# Patient Record
Sex: MALE | Race: WHITE | NOT HISPANIC OR LATINO | Employment: OTHER | URBAN - NONMETROPOLITAN AREA
[De-identification: names, ages, dates, MRNs, and addresses within clinical notes are randomized per-mention and may not be internally consistent; named-entity substitution may affect disease eponyms.]

---

## 2024-03-12 ENCOUNTER — HOSPITAL ENCOUNTER (INPATIENT)
Facility: HOSPITAL | Age: 59
LOS: 4 days | Discharge: HOME | DRG: 398 | End: 2024-03-18
Attending: STUDENT IN AN ORGANIZED HEALTH CARE EDUCATION/TRAINING PROGRAM | Admitting: INTERNAL MEDICINE
Payer: MEDICARE

## 2024-03-12 ENCOUNTER — APPOINTMENT (OUTPATIENT)
Dept: CT IMAGING | Facility: HOSPITAL | Age: 59
DRG: 398 | End: 2024-03-12
Payer: MEDICARE

## 2024-03-12 DIAGNOSIS — Z78.9 IMPAIRED MOBILITY AND ACTIVITIES OF DAILY LIVING: ICD-10-CM

## 2024-03-12 DIAGNOSIS — K35.33 ACUTE APPENDICITIS WITH PERFORATION, LOCALIZED PERITONITIS, ABSCESS, AND GANGRENE: ICD-10-CM

## 2024-03-12 DIAGNOSIS — Z74.09 IMPAIRED MOBILITY AND ACTIVITIES OF DAILY LIVING: ICD-10-CM

## 2024-03-12 DIAGNOSIS — K35.32 PERFORATED APPENDICITIS: Primary | ICD-10-CM

## 2024-03-12 DIAGNOSIS — Z90.49 S/P LAPAROSCOPIC APPENDECTOMY: ICD-10-CM

## 2024-03-12 LAB
ALBUMIN SERPL-MCNC: 3.7 G/DL (ref 3.5–5.2)
ALBUMIN/GLOB SERPL: 0.9 G/DL
ALP SERPL-CCNC: 97 U/L (ref 39–117)
ALT SERPL W P-5'-P-CCNC: 17 U/L (ref 1–41)
ANION GAP SERPL CALCULATED.3IONS-SCNC: 12.2 MMOL/L (ref 5–15)
AST SERPL-CCNC: 15 U/L (ref 1–40)
BASOPHILS # BLD AUTO: 0.04 10*3/MM3 (ref 0–0.2)
BASOPHILS NFR BLD AUTO: 0.3 % (ref 0–1.5)
BILIRUB SERPL-MCNC: 0.6 MG/DL (ref 0–1.2)
BUN SERPL-MCNC: 13 MG/DL (ref 6–20)
BUN/CREAT SERPL: 10.2 (ref 7–25)
CALCIUM SPEC-SCNC: 9.4 MG/DL (ref 8.6–10.5)
CHLORIDE SERPL-SCNC: 95 MMOL/L (ref 98–107)
CO2 SERPL-SCNC: 24.8 MMOL/L (ref 22–29)
CREAT SERPL-MCNC: 1.27 MG/DL (ref 0.76–1.27)
DEPRECATED RDW RBC AUTO: 41.6 FL (ref 37–54)
EGFRCR SERPLBLD CKD-EPI 2021: 65.9 ML/MIN/1.73
EOSINOPHIL # BLD AUTO: 0.21 10*3/MM3 (ref 0–0.4)
EOSINOPHIL NFR BLD AUTO: 1.5 % (ref 0.3–6.2)
ERYTHROCYTE [DISTWIDTH] IN BLOOD BY AUTOMATED COUNT: 13.2 % (ref 12.3–15.4)
GLOBULIN UR ELPH-MCNC: 3.9 GM/DL
GLUCOSE SERPL-MCNC: 286 MG/DL (ref 65–99)
HCT VFR BLD AUTO: 40.2 % (ref 37.5–51)
HGB BLD-MCNC: 13.6 G/DL (ref 13–17.7)
HOLD SPECIMEN: NORMAL
HOLD SPECIMEN: NORMAL
IMM GRANULOCYTES # BLD AUTO: 0.25 10*3/MM3 (ref 0–0.05)
IMM GRANULOCYTES NFR BLD AUTO: 1.8 % (ref 0–0.5)
LIPASE SERPL-CCNC: 16 U/L (ref 13–60)
LYMPHOCYTES # BLD AUTO: 2.21 10*3/MM3 (ref 0.7–3.1)
LYMPHOCYTES NFR BLD AUTO: 16.2 % (ref 19.6–45.3)
MCH RBC QN AUTO: 28.9 PG (ref 26.6–33)
MCHC RBC AUTO-ENTMCNC: 33.8 G/DL (ref 31.5–35.7)
MCV RBC AUTO: 85.5 FL (ref 79–97)
MONOCYTES # BLD AUTO: 1.25 10*3/MM3 (ref 0.1–0.9)
MONOCYTES NFR BLD AUTO: 9.2 % (ref 5–12)
NEUTROPHILS NFR BLD AUTO: 71 % (ref 42.7–76)
NEUTROPHILS NFR BLD AUTO: 9.67 10*3/MM3 (ref 1.7–7)
NRBC BLD AUTO-RTO: 0 /100 WBC (ref 0–0.2)
PLATELET # BLD AUTO: 243 10*3/MM3 (ref 140–450)
PMV BLD AUTO: 9.5 FL (ref 6–12)
POTASSIUM SERPL-SCNC: 4.2 MMOL/L (ref 3.5–5.2)
PROT SERPL-MCNC: 7.6 G/DL (ref 6–8.5)
RBC # BLD AUTO: 4.7 10*6/MM3 (ref 4.14–5.8)
SODIUM SERPL-SCNC: 132 MMOL/L (ref 136–145)
TROPONIN T SERPL HS-MCNC: 45 NG/L
TROPONIN T SERPL HS-MCNC: 48 NG/L
WBC NRBC COR # BLD AUTO: 13.63 10*3/MM3 (ref 3.4–10.8)
WHOLE BLOOD HOLD COAG: NORMAL
WHOLE BLOOD HOLD SPECIMEN: NORMAL

## 2024-03-12 PROCEDURE — 93005 ELECTROCARDIOGRAM TRACING: CPT | Performed by: STUDENT IN AN ORGANIZED HEALTH CARE EDUCATION/TRAINING PROGRAM

## 2024-03-12 PROCEDURE — 83690 ASSAY OF LIPASE: CPT

## 2024-03-12 PROCEDURE — 74177 CT ABD & PELVIS W/CONTRAST: CPT

## 2024-03-12 PROCEDURE — 25510000001 IOPAMIDOL 61 % SOLUTION: Performed by: STUDENT IN AN ORGANIZED HEALTH CARE EDUCATION/TRAINING PROGRAM

## 2024-03-12 PROCEDURE — 99285 EMERGENCY DEPT VISIT HI MDM: CPT

## 2024-03-12 PROCEDURE — 36415 COLL VENOUS BLD VENIPUNCTURE: CPT

## 2024-03-12 PROCEDURE — 84484 ASSAY OF TROPONIN QUANT: CPT

## 2024-03-12 PROCEDURE — 84484 ASSAY OF TROPONIN QUANT: CPT | Performed by: PHYSICIAN ASSISTANT

## 2024-03-12 PROCEDURE — 80053 COMPREHEN METABOLIC PANEL: CPT

## 2024-03-12 PROCEDURE — 25010000002 MORPHINE PER 10 MG: Performed by: STUDENT IN AN ORGANIZED HEALTH CARE EDUCATION/TRAINING PROGRAM

## 2024-03-12 PROCEDURE — 25010000002 ONDANSETRON PER 1 MG: Performed by: STUDENT IN AN ORGANIZED HEALTH CARE EDUCATION/TRAINING PROGRAM

## 2024-03-12 PROCEDURE — 83036 HEMOGLOBIN GLYCOSYLATED A1C: CPT | Performed by: INTERNAL MEDICINE

## 2024-03-12 PROCEDURE — 85025 COMPLETE CBC W/AUTO DIFF WBC: CPT

## 2024-03-12 RX ORDER — ONDANSETRON 2 MG/ML
4 INJECTION INTRAMUSCULAR; INTRAVENOUS ONCE
Status: COMPLETED | OUTPATIENT
Start: 2024-03-12 | End: 2024-03-12

## 2024-03-12 RX ORDER — SODIUM CHLORIDE 0.9 % (FLUSH) 0.9 %
10 SYRINGE (ML) INJECTION AS NEEDED
Status: DISCONTINUED | OUTPATIENT
Start: 2024-03-12 | End: 2024-03-18 | Stop reason: HOSPADM

## 2024-03-12 RX ADMIN — MORPHINE SULFATE 4 MG: 4 INJECTION, SOLUTION INTRAMUSCULAR; INTRAVENOUS at 22:40

## 2024-03-12 RX ADMIN — IOPAMIDOL 100 ML: 612 INJECTION, SOLUTION INTRAVENOUS at 23:30

## 2024-03-12 RX ADMIN — ONDANSETRON 4 MG: 2 INJECTION INTRAMUSCULAR; INTRAVENOUS at 22:40

## 2024-03-13 ENCOUNTER — ANESTHESIA EVENT (OUTPATIENT)
Dept: PERIOP | Facility: HOSPITAL | Age: 59
End: 2024-03-13
Payer: MEDICARE

## 2024-03-13 ENCOUNTER — ANESTHESIA (OUTPATIENT)
Dept: PERIOP | Facility: HOSPITAL | Age: 59
End: 2024-03-13
Payer: MEDICARE

## 2024-03-13 PROBLEM — Z79.4 TYPE 2 DIABETES MELLITUS, WITH LONG-TERM CURRENT USE OF INSULIN: Status: ACTIVE | Noted: 2024-03-13

## 2024-03-13 PROBLEM — K35.33 ACUTE APPENDICITIS WITH PERFORATION, LOCALIZED PERITONITIS, ABSCESS, AND GANGRENE: Status: ACTIVE | Noted: 2024-03-13

## 2024-03-13 PROBLEM — K35.32 PERFORATED APPENDICITIS: Status: ACTIVE | Noted: 2024-03-13

## 2024-03-13 PROBLEM — E11.9 TYPE 2 DIABETES MELLITUS, WITH LONG-TERM CURRENT USE OF INSULIN: Status: ACTIVE | Noted: 2024-03-13

## 2024-03-13 PROBLEM — I10 ESSENTIAL HYPERTENSION: Status: ACTIVE | Noted: 2024-03-13

## 2024-03-13 LAB
ANION GAP SERPL CALCULATED.3IONS-SCNC: 11.2 MMOL/L (ref 5–15)
BACTERIA UR QL AUTO: ABNORMAL /HPF
BILIRUB UR QL STRIP: NEGATIVE
BUN SERPL-MCNC: 13 MG/DL (ref 6–20)
BUN/CREAT SERPL: 10 (ref 7–25)
CALCIUM SPEC-SCNC: 8.4 MG/DL (ref 8.6–10.5)
CHLORIDE SERPL-SCNC: 100 MMOL/L (ref 98–107)
CLARITY UR: CLEAR
CO2 SERPL-SCNC: 22.8 MMOL/L (ref 22–29)
COLOR UR: YELLOW
CREAT SERPL-MCNC: 1.3 MG/DL (ref 0.76–1.27)
DEPRECATED RDW RBC AUTO: 41.8 FL (ref 37–54)
EGFRCR SERPLBLD CKD-EPI 2021: 64.1 ML/MIN/1.73
ERYTHROCYTE [DISTWIDTH] IN BLOOD BY AUTOMATED COUNT: 13.3 % (ref 12.3–15.4)
GLUCOSE BLDC GLUCOMTR-MCNC: 129 MG/DL (ref 70–130)
GLUCOSE BLDC GLUCOMTR-MCNC: 136 MG/DL (ref 70–130)
GLUCOSE BLDC GLUCOMTR-MCNC: 136 MG/DL (ref 70–130)
GLUCOSE BLDC GLUCOMTR-MCNC: 178 MG/DL (ref 70–130)
GLUCOSE BLDC GLUCOMTR-MCNC: 211 MG/DL (ref 70–130)
GLUCOSE BLDC GLUCOMTR-MCNC: 273 MG/DL (ref 70–130)
GLUCOSE SERPL-MCNC: 143 MG/DL (ref 65–99)
GLUCOSE UR STRIP-MCNC: ABNORMAL MG/DL
HBA1C MFR BLD: 9.3 % (ref 4.8–5.6)
HCT VFR BLD AUTO: 36.8 % (ref 37.5–51)
HGB BLD-MCNC: 12.4 G/DL (ref 13–17.7)
HGB UR QL STRIP.AUTO: NEGATIVE
HOLD SPECIMEN: NORMAL
HYALINE CASTS UR QL AUTO: ABNORMAL /LPF
KETONES UR QL STRIP: NEGATIVE
LEUKOCYTE ESTERASE UR QL STRIP.AUTO: NEGATIVE
MCH RBC QN AUTO: 28.9 PG (ref 26.6–33)
MCHC RBC AUTO-ENTMCNC: 33.7 G/DL (ref 31.5–35.7)
MCV RBC AUTO: 85.8 FL (ref 79–97)
NITRITE UR QL STRIP: NEGATIVE
PH UR STRIP.AUTO: 5.5 [PH] (ref 5–8)
PLATELET # BLD AUTO: 225 10*3/MM3 (ref 140–450)
PMV BLD AUTO: 9.4 FL (ref 6–12)
POTASSIUM SERPL-SCNC: 4.1 MMOL/L (ref 3.5–5.2)
PROT UR QL STRIP: ABNORMAL
RBC # BLD AUTO: 4.29 10*6/MM3 (ref 4.14–5.8)
RBC # UR STRIP: ABNORMAL /HPF
REF LAB TEST METHOD: ABNORMAL
SODIUM SERPL-SCNC: 134 MMOL/L (ref 136–145)
SP GR UR STRIP: >1.03 (ref 1–1.03)
SQUAMOUS #/AREA URNS HPF: ABNORMAL /HPF
UROBILINOGEN UR QL STRIP: ABNORMAL
WBC # UR STRIP: ABNORMAL /HPF
WBC NRBC COR # BLD AUTO: 12.39 10*3/MM3 (ref 3.4–10.8)

## 2024-03-13 PROCEDURE — 25810000003 LACTATED RINGERS PER 1000 ML: Performed by: SURGERY

## 2024-03-13 PROCEDURE — 25010000002 SUGAMMADEX 200 MG/2ML SOLUTION: Performed by: NURSE ANESTHETIST, CERTIFIED REGISTERED

## 2024-03-13 PROCEDURE — 99222 1ST HOSP IP/OBS MODERATE 55: CPT | Performed by: SURGERY

## 2024-03-13 PROCEDURE — 25010000002 PIPERACILLIN SOD-TAZOBACTAM PER 1 G: Performed by: INTERNAL MEDICINE

## 2024-03-13 PROCEDURE — 82948 REAGENT STRIP/BLOOD GLUCOSE: CPT | Performed by: INTERNAL MEDICINE

## 2024-03-13 PROCEDURE — G0378 HOSPITAL OBSERVATION PER HR: HCPCS

## 2024-03-13 PROCEDURE — 25010000002 ONDANSETRON PER 1 MG: Performed by: NURSE ANESTHETIST, CERTIFIED REGISTERED

## 2024-03-13 PROCEDURE — 25010000002 ENOXAPARIN PER 10 MG: Performed by: INTERNAL MEDICINE

## 2024-03-13 PROCEDURE — 82948 REAGENT STRIP/BLOOD GLUCOSE: CPT

## 2024-03-13 PROCEDURE — 25010000002 LIDOCAINE 1 % SOLUTION: Performed by: SURGERY

## 2024-03-13 PROCEDURE — 25810000003 SODIUM CHLORIDE 0.9 % SOLUTION: Performed by: STUDENT IN AN ORGANIZED HEALTH CARE EDUCATION/TRAINING PROGRAM

## 2024-03-13 PROCEDURE — 63710000001 INSULIN DETEMIR PER 5 UNITS: Performed by: INTERNAL MEDICINE

## 2024-03-13 PROCEDURE — 63710000001 INSULIN REGULAR HUMAN PER 5 UNITS: Performed by: SURGERY

## 2024-03-13 PROCEDURE — 87040 BLOOD CULTURE FOR BACTERIA: CPT | Performed by: PHYSICIAN ASSISTANT

## 2024-03-13 PROCEDURE — 25010000002 PIPERACILLIN SOD-TAZOBACTAM PER 1 G: Performed by: PHYSICIAN ASSISTANT

## 2024-03-13 PROCEDURE — 25010000002 CEFAZOLIN PER 500 MG: Performed by: SURGERY

## 2024-03-13 PROCEDURE — 85027 COMPLETE CBC AUTOMATED: CPT | Performed by: INTERNAL MEDICINE

## 2024-03-13 PROCEDURE — 80048 BASIC METABOLIC PNL TOTAL CA: CPT | Performed by: INTERNAL MEDICINE

## 2024-03-13 PROCEDURE — 63710000001 INSULIN REGULAR HUMAN PER 5 UNITS: Performed by: INTERNAL MEDICINE

## 2024-03-13 PROCEDURE — 25010000002 FENTANYL CITRATE (PF) 50 MCG/ML SOLUTION PREFILLED SYRINGE: Performed by: NURSE ANESTHETIST, CERTIFIED REGISTERED

## 2024-03-13 PROCEDURE — 25010000002 HYDROMORPHONE 1 MG/ML SOLUTION: Performed by: NURSE ANESTHETIST, CERTIFIED REGISTERED

## 2024-03-13 PROCEDURE — 25010000002 DEXAMETHASONE PER 1 MG: Performed by: NURSE ANESTHETIST, CERTIFIED REGISTERED

## 2024-03-13 PROCEDURE — 81001 URINALYSIS AUTO W/SCOPE: CPT | Performed by: STUDENT IN AN ORGANIZED HEALTH CARE EDUCATION/TRAINING PROGRAM

## 2024-03-13 PROCEDURE — 25810000003 SODIUM CHLORIDE 0.9 % SOLUTION: Performed by: INTERNAL MEDICINE

## 2024-03-13 PROCEDURE — 99232 SBSQ HOSP IP/OBS MODERATE 35: CPT | Performed by: NURSE PRACTITIONER

## 2024-03-13 PROCEDURE — 25010000002 HEPARIN (PORCINE) PER 1000 UNITS: Performed by: SURGERY

## 2024-03-13 PROCEDURE — 25010000002 BUPIVACAINE (PF) 0.25 % SOLUTION: Performed by: SURGERY

## 2024-03-13 PROCEDURE — 44970 LAPAROSCOPY APPENDECTOMY: CPT | Performed by: SURGERY

## 2024-03-13 PROCEDURE — 25010000002 PROPOFOL 200 MG/20ML EMULSION: Performed by: NURSE ANESTHETIST, CERTIFIED REGISTERED

## 2024-03-13 PROCEDURE — 0DTJ4ZZ RESECTION OF APPENDIX, PERCUTANEOUS ENDOSCOPIC APPROACH: ICD-10-PCS | Performed by: SURGERY

## 2024-03-13 PROCEDURE — 25010000002 PIPERACILLIN SOD-TAZOBACTAM PER 1 G: Performed by: SURGERY

## 2024-03-13 PROCEDURE — 99223 1ST HOSP IP/OBS HIGH 75: CPT | Performed by: INTERNAL MEDICINE

## 2024-03-13 DEVICE — LIGAMAX 5 MM ENDOSCOPIC MULTIPLE CLIP APPLIER
Type: IMPLANTABLE DEVICE | Site: ABDOMEN | Status: FUNCTIONAL
Brand: LIGAMAX

## 2024-03-13 DEVICE — THE ECHELON, ECHELON ENDOPATH™ AND ECHELON FLEX™ FAMILIES OF ENDOSCOPIC LINEAR CUTTERS AND RELOADS ARE STERILE, SINGLE PATIENT USE INSTRUMENTS THAT SIMULTANEOUSLY CUT AND STAPLE TISSUE. THERE ARE SIX STAGGERED ROWS OF STAPLES, THREE ON EITHER SIDE OF THE CUT LINE. THE 45 MM INSTRUMENTS HAVE A STAPLE LINE THATIS APPROXIMATELY 45 MM LONG AND A CUT LINE THAT IS APPROXIMATELY 42 MM LONG. THE SHAFT CAN ROTATE FREELY IN BOTH DIRECTIONS AND AN ARTICULATION MECHANISM ON ARTICULATING INSTRUMENTS ENABLES BENDING THE DISTAL PORTIONOF THE SHAFT TO FACILITATE LATERAL ACCESS OF THE OPERATIVE SITE.THE INSTRUMENTS ARE SHIPPED WITHOUT A RELOAD AND MUST BE LOADED PRIOR TO USE. A STAPLE RETAINING CAP ON THE RELOAD PROTECTS THE STAPLE LEG POINTS DURING SHIPPING AND TRANSPORTATION. THE INSTRUMENTS’ LOCK-OUT FEATURE IS DESIGNED TO PREVENT A USED RELOAD FROM BEING REFIRED.
Type: IMPLANTABLE DEVICE | Site: ABDOMEN | Status: FUNCTIONAL
Brand: ECHELON ENDOPATH

## 2024-03-13 RX ORDER — ACETAMINOPHEN 325 MG/1
650 TABLET ORAL EVERY 4 HOURS PRN
Status: DISCONTINUED | OUTPATIENT
Start: 2024-03-13 | End: 2024-03-18 | Stop reason: HOSPADM

## 2024-03-13 RX ORDER — PROPOFOL 10 MG/ML
INJECTION, EMULSION INTRAVENOUS AS NEEDED
Status: DISCONTINUED | OUTPATIENT
Start: 2024-03-13 | End: 2024-03-13 | Stop reason: SURG

## 2024-03-13 RX ORDER — ENOXAPARIN SODIUM 100 MG/ML
40 INJECTION SUBCUTANEOUS EVERY 24 HOURS
Status: DISCONTINUED | OUTPATIENT
Start: 2024-03-13 | End: 2024-03-18 | Stop reason: HOSPADM

## 2024-03-13 RX ORDER — IPRATROPIUM BROMIDE AND ALBUTEROL SULFATE 2.5; .5 MG/3ML; MG/3ML
3 SOLUTION RESPIRATORY (INHALATION) ONCE AS NEEDED
Status: DISCONTINUED | OUTPATIENT
Start: 2024-03-13 | End: 2024-03-13 | Stop reason: HOSPADM

## 2024-03-13 RX ORDER — DEXTROSE MONOHYDRATE 25 G/50ML
25 INJECTION, SOLUTION INTRAVENOUS
Status: DISCONTINUED | OUTPATIENT
Start: 2024-03-13 | End: 2024-03-18 | Stop reason: HOSPADM

## 2024-03-13 RX ORDER — CEFAZOLIN SODIUM IN 0.9 % NACL 3 G/100 ML
3000 INTRAVENOUS SOLUTION, PIGGYBACK (ML) INTRAVENOUS ONCE
Status: COMPLETED | OUTPATIENT
Start: 2024-03-13 | End: 2024-03-13

## 2024-03-13 RX ORDER — LIDOCAINE HCL/PF 100 MG/5ML
SYRINGE (ML) INJECTION AS NEEDED
Status: DISCONTINUED | OUTPATIENT
Start: 2024-03-13 | End: 2024-03-13 | Stop reason: SURG

## 2024-03-13 RX ORDER — SODIUM CHLORIDE 9 MG/ML
40 INJECTION, SOLUTION INTRAVENOUS AS NEEDED
Status: DISCONTINUED | OUTPATIENT
Start: 2024-03-13 | End: 2024-03-13 | Stop reason: HOSPADM

## 2024-03-13 RX ORDER — ACETAMINOPHEN 160 MG/5ML
650 SOLUTION ORAL EVERY 4 HOURS PRN
Status: DISCONTINUED | OUTPATIENT
Start: 2024-03-13 | End: 2024-03-18 | Stop reason: HOSPADM

## 2024-03-13 RX ORDER — SODIUM CHLORIDE, SODIUM LACTATE, POTASSIUM CHLORIDE, CALCIUM CHLORIDE 600; 310; 30; 20 MG/100ML; MG/100ML; MG/100ML; MG/100ML
50 INJECTION, SOLUTION INTRAVENOUS CONTINUOUS
Status: DISCONTINUED | OUTPATIENT
Start: 2024-03-13 | End: 2024-03-13

## 2024-03-13 RX ORDER — SODIUM CHLORIDE 0.9 % (FLUSH) 0.9 %
10 SYRINGE (ML) INJECTION EVERY 12 HOURS SCHEDULED
Status: DISCONTINUED | OUTPATIENT
Start: 2024-03-13 | End: 2024-03-13 | Stop reason: HOSPADM

## 2024-03-13 RX ORDER — LIDOCAINE HYDROCHLORIDE 10 MG/ML
INJECTION, SOLUTION INFILTRATION; PERINEURAL AS NEEDED
Status: DISCONTINUED | OUTPATIENT
Start: 2024-03-13 | End: 2024-03-13 | Stop reason: HOSPADM

## 2024-03-13 RX ORDER — MORPHINE SULFATE 2 MG/ML
2 INJECTION, SOLUTION INTRAMUSCULAR; INTRAVENOUS EVERY 4 HOURS PRN
Status: DISPENSED | OUTPATIENT
Start: 2024-03-13 | End: 2024-03-18

## 2024-03-13 RX ORDER — HEPARIN SODIUM 5000 [USP'U]/ML
5000 INJECTION, SOLUTION INTRAVENOUS; SUBCUTANEOUS ONCE
Status: COMPLETED | OUTPATIENT
Start: 2024-03-13 | End: 2024-03-13

## 2024-03-13 RX ORDER — INSULIN LISPRO 100 [IU]/ML
45 INJECTION, SOLUTION INTRAVENOUS; SUBCUTANEOUS
COMMUNITY

## 2024-03-13 RX ORDER — BISACODYL 10 MG
10 SUPPOSITORY, RECTAL RECTAL DAILY PRN
Status: DISCONTINUED | OUTPATIENT
Start: 2024-03-13 | End: 2024-03-15

## 2024-03-13 RX ORDER — PROCHLORPERAZINE EDISYLATE 5 MG/ML
10 INJECTION INTRAMUSCULAR; INTRAVENOUS ONCE AS NEEDED
Status: DISCONTINUED | OUTPATIENT
Start: 2024-03-13 | End: 2024-03-13 | Stop reason: HOSPADM

## 2024-03-13 RX ORDER — BUPIVACAINE HYDROCHLORIDE 2.5 MG/ML
INJECTION, SOLUTION EPIDURAL; INFILTRATION; INTRACAUDAL AS NEEDED
Status: DISCONTINUED | OUTPATIENT
Start: 2024-03-13 | End: 2024-03-13 | Stop reason: HOSPADM

## 2024-03-13 RX ORDER — DEXAMETHASONE SODIUM PHOSPHATE 4 MG/ML
INJECTION, SOLUTION INTRA-ARTICULAR; INTRALESIONAL; INTRAMUSCULAR; INTRAVENOUS; SOFT TISSUE AS NEEDED
Status: DISCONTINUED | OUTPATIENT
Start: 2024-03-13 | End: 2024-03-13 | Stop reason: SURG

## 2024-03-13 RX ORDER — INSULIN GLARGINE 300 U/ML
120 INJECTION, SOLUTION SUBCUTANEOUS DAILY
COMMUNITY
Start: 2023-10-24 | End: 2024-04-21

## 2024-03-13 RX ORDER — ROCURONIUM BROMIDE 50 MG/5 ML
SYRINGE (ML) INTRAVENOUS AS NEEDED
Status: DISCONTINUED | OUTPATIENT
Start: 2024-03-13 | End: 2024-03-13 | Stop reason: SURG

## 2024-03-13 RX ORDER — SUCCINYLCHOLINE/SOD CL,ISO/PF 200MG/10ML
SYRINGE (ML) INTRAVENOUS AS NEEDED
Status: DISCONTINUED | OUTPATIENT
Start: 2024-03-13 | End: 2024-03-13 | Stop reason: SURG

## 2024-03-13 RX ORDER — ONDANSETRON 2 MG/ML
INJECTION INTRAMUSCULAR; INTRAVENOUS AS NEEDED
Status: DISCONTINUED | OUTPATIENT
Start: 2024-03-13 | End: 2024-03-13 | Stop reason: SURG

## 2024-03-13 RX ORDER — ACETAMINOPHEN 650 MG/1
650 SUPPOSITORY RECTAL EVERY 4 HOURS PRN
Status: DISCONTINUED | OUTPATIENT
Start: 2024-03-13 | End: 2024-03-18 | Stop reason: HOSPADM

## 2024-03-13 RX ORDER — BISACODYL 5 MG/1
5 TABLET, DELAYED RELEASE ORAL DAILY PRN
Status: DISCONTINUED | OUTPATIENT
Start: 2024-03-13 | End: 2024-03-15

## 2024-03-13 RX ORDER — MAGNESIUM HYDROXIDE 1200 MG/15ML
LIQUID ORAL AS NEEDED
Status: DISCONTINUED | OUTPATIENT
Start: 2024-03-13 | End: 2024-03-13 | Stop reason: HOSPADM

## 2024-03-13 RX ORDER — AMOXICILLIN 250 MG
2 CAPSULE ORAL 2 TIMES DAILY PRN
Status: DISCONTINUED | OUTPATIENT
Start: 2024-03-13 | End: 2024-03-15

## 2024-03-13 RX ORDER — ONDANSETRON 2 MG/ML
4 INJECTION INTRAMUSCULAR; INTRAVENOUS ONCE AS NEEDED
Status: DISCONTINUED | OUTPATIENT
Start: 2024-03-13 | End: 2024-03-13 | Stop reason: HOSPADM

## 2024-03-13 RX ORDER — SODIUM CHLORIDE 9 MG/ML
100 INJECTION, SOLUTION INTRAVENOUS CONTINUOUS
Status: DISCONTINUED | OUTPATIENT
Start: 2024-03-13 | End: 2024-03-16

## 2024-03-13 RX ORDER — POLYETHYLENE GLYCOL 3350 17 G/17G
17 POWDER, FOR SOLUTION ORAL DAILY PRN
Status: DISCONTINUED | OUTPATIENT
Start: 2024-03-13 | End: 2024-03-15

## 2024-03-13 RX ORDER — SODIUM CHLORIDE 0.9 % (FLUSH) 0.9 %
10 SYRINGE (ML) INJECTION EVERY 12 HOURS SCHEDULED
Status: DISCONTINUED | OUTPATIENT
Start: 2024-03-13 | End: 2024-03-18 | Stop reason: HOSPADM

## 2024-03-13 RX ORDER — DULAGLUTIDE 4.5 MG/.5ML
4.5 INJECTION, SOLUTION SUBCUTANEOUS WEEKLY
COMMUNITY

## 2024-03-13 RX ORDER — NICOTINE POLACRILEX 4 MG
15 LOZENGE BUCCAL
Status: DISCONTINUED | OUTPATIENT
Start: 2024-03-13 | End: 2024-03-18 | Stop reason: HOSPADM

## 2024-03-13 RX ORDER — SODIUM CHLORIDE 0.9 % (FLUSH) 0.9 %
10 SYRINGE (ML) INJECTION AS NEEDED
Status: DISCONTINUED | OUTPATIENT
Start: 2024-03-13 | End: 2024-03-18 | Stop reason: HOSPADM

## 2024-03-13 RX ORDER — SODIUM CHLORIDE 0.9 % (FLUSH) 0.9 %
10 SYRINGE (ML) INJECTION AS NEEDED
Status: DISCONTINUED | OUTPATIENT
Start: 2024-03-13 | End: 2024-03-13 | Stop reason: HOSPADM

## 2024-03-13 RX ORDER — NALOXONE HCL 0.4 MG/ML
0.4 VIAL (ML) INJECTION
Status: DISCONTINUED | OUTPATIENT
Start: 2024-03-13 | End: 2024-03-18 | Stop reason: HOSPADM

## 2024-03-13 RX ORDER — SODIUM CHLORIDE 9 MG/ML
40 INJECTION, SOLUTION INTRAVENOUS AS NEEDED
Status: DISCONTINUED | OUTPATIENT
Start: 2024-03-13 | End: 2024-03-18 | Stop reason: HOSPADM

## 2024-03-13 RX ORDER — HYDROCODONE BITARTRATE AND ACETAMINOPHEN 7.5; 325 MG/1; MG/1
1 TABLET ORAL EVERY 4 HOURS PRN
Status: DISPENSED | OUTPATIENT
Start: 2024-03-13 | End: 2024-03-18

## 2024-03-13 RX ORDER — FENTANYL CITRATE 50 UG/ML
INJECTION, SOLUTION INTRAMUSCULAR; INTRAVENOUS AS NEEDED
Status: DISCONTINUED | OUTPATIENT
Start: 2024-03-13 | End: 2024-03-13 | Stop reason: SURG

## 2024-03-13 RX ORDER — ONDANSETRON 2 MG/ML
4 INJECTION INTRAMUSCULAR; INTRAVENOUS EVERY 6 HOURS PRN
Status: DISCONTINUED | OUTPATIENT
Start: 2024-03-13 | End: 2024-03-18 | Stop reason: HOSPADM

## 2024-03-13 RX ADMIN — Medication 100 MG: at 13:43

## 2024-03-13 RX ADMIN — PIPERACILLIN SODIUM AND TAZOBACTAM SODIUM 4.5 G: 4; .5 INJECTION, SOLUTION INTRAVENOUS at 10:59

## 2024-03-13 RX ADMIN — DEXAMETHASONE SODIUM PHOSPHATE 4 MG: 4 INJECTION, SOLUTION INTRA-ARTICULAR; INTRALESIONAL; INTRAMUSCULAR; INTRAVENOUS; SOFT TISSUE at 14:13

## 2024-03-13 RX ADMIN — SUGAMMADEX 200 MG: 100 INJECTION, SOLUTION INTRAVENOUS at 15:28

## 2024-03-13 RX ADMIN — ONDANSETRON 4 MG: 2 INJECTION INTRAMUSCULAR; INTRAVENOUS at 14:13

## 2024-03-13 RX ADMIN — Medication 30 MG: at 13:48

## 2024-03-13 RX ADMIN — SODIUM CHLORIDE 1000 ML: 9 INJECTION, SOLUTION INTRAVENOUS at 01:37

## 2024-03-13 RX ADMIN — Medication 180 MG: at 13:43

## 2024-03-13 RX ADMIN — Medication 10 ML: at 08:38

## 2024-03-13 RX ADMIN — ENOXAPARIN SODIUM 40 MG: 40 INJECTION SUBCUTANEOUS at 06:14

## 2024-03-13 RX ADMIN — PIPERACILLIN SODIUM AND TAZOBACTAM SODIUM 4.5 G: 4; .5 INJECTION, SOLUTION INTRAVENOUS at 18:23

## 2024-03-13 RX ADMIN — Medication 10 MG: at 13:43

## 2024-03-13 RX ADMIN — PROPOFOL 200 MG: 10 INJECTION, EMULSION INTRAVENOUS at 13:43

## 2024-03-13 RX ADMIN — HUMAN INSULIN 4 UNITS: 100 INJECTION, SOLUTION SUBCUTANEOUS at 18:00

## 2024-03-13 RX ADMIN — Medication 10 ML: at 02:44

## 2024-03-13 RX ADMIN — HYDROMORPHONE HYDROCHLORIDE 0.5 MG: 1 INJECTION, SOLUTION INTRAMUSCULAR; INTRAVENOUS; SUBCUTANEOUS at 16:37

## 2024-03-13 RX ADMIN — Medication 10 ML: at 20:25

## 2024-03-13 RX ADMIN — FENTANYL CITRATE 50 MCG: 50 INJECTION, SOLUTION INTRAMUSCULAR; INTRAVENOUS at 13:39

## 2024-03-13 RX ADMIN — HYDROMORPHONE HYDROCHLORIDE 0.5 MG: 1 INJECTION, SOLUTION INTRAMUSCULAR; INTRAVENOUS; SUBCUTANEOUS at 15:53

## 2024-03-13 RX ADMIN — Medication 10 MG: at 14:34

## 2024-03-13 RX ADMIN — HEPARIN SODIUM 5000 UNITS: 5000 INJECTION, SOLUTION INTRAVENOUS; SUBCUTANEOUS at 13:22

## 2024-03-13 RX ADMIN — FENTANYL CITRATE 50 MCG: 50 INJECTION, SOLUTION INTRAMUSCULAR; INTRAVENOUS at 14:24

## 2024-03-13 RX ADMIN — Medication 10 ML: at 17:28

## 2024-03-13 RX ADMIN — PIPERACILLIN SODIUM AND TAZOBACTAM SODIUM 4.5 G: 4; .5 INJECTION, SOLUTION INTRAVENOUS at 01:37

## 2024-03-13 RX ADMIN — INSULIN DETEMIR 8 UNITS: 100 INJECTION, SOLUTION SUBCUTANEOUS at 08:38

## 2024-03-13 RX ADMIN — SODIUM CHLORIDE, POTASSIUM CHLORIDE, SODIUM LACTATE AND CALCIUM CHLORIDE: 600; 310; 30; 20 INJECTION, SOLUTION INTRAVENOUS at 15:00

## 2024-03-13 RX ADMIN — SODIUM CHLORIDE 100 ML/HR: 9 INJECTION, SOLUTION INTRAVENOUS at 06:37

## 2024-03-13 RX ADMIN — SODIUM CHLORIDE, POTASSIUM CHLORIDE, SODIUM LACTATE AND CALCIUM CHLORIDE: 600; 310; 30; 20 INJECTION, SOLUTION INTRAVENOUS at 13:34

## 2024-03-13 RX ADMIN — Medication 3 G: at 13:48

## 2024-03-13 RX ADMIN — HUMAN INSULIN 2 UNITS: 100 INJECTION, SOLUTION SUBCUTANEOUS at 02:17

## 2024-03-13 NOTE — H&P
[facility]   HISTORY AND PHYSICAL      Name:  Deep Mendez   Age:  58 y.o.  Sex:  male  :  1965  MRN:  [id number]   Visit Number:  [id number]  Admission Date:  3/12/2024  Date Of Service:  24  Primary Care Physician:  Sami Fisher MD    Chief Complaint:     Abdominal pain.    History Of Presenting Illness:      Deep Mendez is a 58-year-old male with history of hypertension and diabetes mellitus type 2 on insulin and Trulicity was brought to the emergency room by his friend with right lower quadrant abdominal pain that started earlier in the day.  Patient states that he was in his usual state of health until today when he was in the bathroom had a bowel movement, subsequently developed right upper and lower quadrant pain that was sharp and radiating up into the chest.  He had some nausea but no vomiting.  No prior history of abdominal surgeries.  He is diabetic and is status post low left below knee amputation.  No fevers.    In the emergency room, he was afebrile and hemodynamically stable saturating at 97% on room air but his blood pressure was elevated at 164/88.  Initial troponin 45 with a repeat at 48.  CMP was unremarkable except for a sodium of 132 and a glucose of 286.  Lipase normal at 16.  CBC unremarkable except for a white count of 13.6.  CT of the abdomen and pelvis done in the emergency room showed findings consistent with perforated appendicitis with ill-defined developing periappendiceal abscess.  Nonobstructing punctate left renal stones were noted.  Patient's condition was discussed with Dr. Zayas from surgical services who recommended antibiotics therapy and admission to the hospital.  Patient was given morphine, Zofran and IV Zosyn in the emergency room.  He also received a liter of normal saline bolus and was subsequently admitted to the medical floor with telemetry for perforated appendicitis.    Review Of Systems:    All systems were reviewed  "and negative except as mentioned in history of presenting illness, assessment and plan.    Past Medical History: Patient  has a past medical history of Diabetes mellitus and Hypertension.    Past Surgical History: Patient  has a past surgical history that includes Below knee leg amputation and Eye surgery.    Social History: Patient  reports that he has quit smoking. His smoking use included cigarettes. He does not have any smokeless tobacco history on file. He reports that he does not drink alcohol and does not use drugs.    Family History:  Patient denies any significant past medical history.    Allergies:      Patient has no known allergies.    Home Medications:    Prior to Admission Medications       None     ED Medications:    Medications   sodium chloride 0.9 % flush 10 mL (has no administration in time range)   sodium chloride 0.9 % bolus 1,000 mL (has no administration in time range)   piperacillin-tazobactam (ZOSYN) 4.5 g in iso-osmotic dextrose 100 mL IVPB (premix) (has no administration in time range)   morphine injection 4 mg (4 mg Intravenous Given 3/12/24 2240)   ondansetron (ZOFRAN) injection 4 mg (4 mg Intravenous Given 3/12/24 2240)   iopamidol (ISOVUE-300) 61 % injection 100 mL (100 mL Intravenous Given 3/12/24 2330)     Vital Signs:  Temp:  [97.7 °F (36.5 °C)] 97.7 °F (36.5 °C)  Heart Rate:  [92-99] 92  Resp:  [18] 18  BP: (133-164)/(77-88) 133/82        03/12/24 2030   Weight: 124 kg (273 lb)     Body mass index is 37.03 kg/m².    Physical Exam:     Most recent vital Signs: /82   Pulse 92   Temp 97.7 °F (36.5 °C) (Oral)   Resp 18   Ht 182.9 cm (72\")   Wt 124 kg (273 lb)   SpO2 95%   BMI 37.03 kg/m²     Physical Exam  Constitutional:       General: He is not in acute distress.     Appearance: Normal appearance. He is obese. He is not ill-appearing.   HENT:      Head: Normocephalic and atraumatic.      Right Ear: External ear normal.      Left Ear: External ear normal.      Nose: Nose " normal.      Mouth/Throat:      Mouth: Mucous membranes are moist.   Eyes:      Extraocular Movements: Extraocular movements intact.      Conjunctiva/sclera: Conjunctivae normal.   Cardiovascular:      Rate and Rhythm: Normal rate and regular rhythm.      Pulses: Normal pulses.      Heart sounds: Normal heart sounds. No murmur heard.  Pulmonary:      Effort: Pulmonary effort is normal.      Breath sounds: Normal breath sounds. No wheezing or rales.   Abdominal:      General: Bowel sounds are normal.      Palpations: Abdomen is soft.      Tenderness: There is abdominal tenderness. There is no guarding or rebound.      Comments: Obese abdomen with right lower quadrant tenderness.   Musculoskeletal:         General: Normal range of motion.      Cervical back: Neck supple.      Right lower leg: Edema present.      Comments: Left below-knee amputation with prosthetic leg noted.  2+ pitting edema noted on the right leg.   Skin:     General: Skin is warm.      Findings: No erythema or rash.   Neurological:      General: No focal deficit present.      Mental Status: He is oriented to person, place, and time. Mental status is at baseline.   Psychiatric:         Mood and Affect: Mood normal.         Behavior: Behavior normal.       Laboratory data:    I have reviewed the labs done in the emergency room.    Results from last 7 days   Lab Units 03/12/24 2040   SODIUM mmol/L 132*   POTASSIUM mmol/L 4.2   CHLORIDE mmol/L 95*   CO2 mmol/L 24.8   BUN mg/dL 13   CREATININE mg/dL 1.27   CALCIUM mg/dL 9.4   BILIRUBIN mg/dL 0.6   ALK PHOS U/L 97   ALT (SGPT) U/L 17   AST (SGOT) U/L 15   GLUCOSE mg/dL 286*     Results from last 7 days   Lab Units 03/12/24 2040   WBC 10*3/mm3 13.63*   HEMOGLOBIN g/dL 13.6   HEMATOCRIT % 40.2   PLATELETS 10*3/mm3 243         Results from last 7 days   Lab Units 03/12/24  2242 03/12/24 2040   HSTROP T ng/L 48* 45*             Results from last 7 days   Lab Units 03/12/24 2040   LIPASE U/L 16     EKG:       EKG done in the emergency room was reviewed by me.  It shows sinus rhythm at 95 bpm.  Normal axis.  No significant ST-T changes were noted.    Radiology:    CT Abdomen Pelvis With Contrast    Result Date: 3/13/2024  FINAL REPORT TECHNIQUE: null CLINICAL HISTORY: RUQ abd pain COMPARISON: null FINDINGS: CT abdomen and pelvis with contrast Comparison: None Findings: There is mild bibasilar atelectasis. The liver, pancreas, spleen, and adrenal glands are unremarkable. There are few tiny stones in the gallbladder. Gallbladder appears otherwise normal. There is a punctate nonobstructing left renal stone. Right kidney is unremarkable. The proximal portion of the appendix is dilated. The distal portion of the appendix is ruptured with an ill-defined developing abscess containing foci of gas measuring about 4.5 x 3.7 cm. There is reactive wall thickening of the distal sigmoid colon which passes immediately adjacent to the collection. There is no bowel obstruction. Aorta is normal diameter. There are no enlarged lymph nodes. There is no acute fracture or suspicious lytic or sclerotic lesion. There is a chronic appearing mild superior endplate compression fracture of L5.     Impression: 1. Findings consistent with perforated appendicitis with ill-defined developing periappendiceal abscess. 2. Chronic findings as above. Authenticated and Electronically Signed by Musa Esquivel MD on 03/13/2024 12:20:57 AM     Assessment:    Acute perforated appendicitis with abscess, POA.  Diabetes mellitus type 2 with hyperglycemia, POA.  Essential hypertension.  Obesity with a BMI of 37.  Status post left BKA.    Plan:    Acute perforated appendicitis.  - Patient will be continued on IV antibiotics therapy with Zosyn.  - Morphine and Zofran for pain control and nausea.  - We will consult Dr. Zayas from surgical services.  - We will keep him n.p.o. with ice chips until evaluated by surgery.  - Continue IV fluids with normal saline at 100  mL/h.    Diabetes mellitus type 2.  - We will place him on low-dose Levemir 8 units daily.  - Subcutaneous insulin protocol for coverage.  - Will obtain hemoglobin A1c levels.      Risk Assessment: Moderate  DVT Prophylaxis: Enoxaparin  Code Status: Full  Diet: N.p.o.      Ryan Ozuna MD  03/13/24  01:16 EDT    Dictated utilizing Dragon dictation.

## 2024-03-13 NOTE — CONSULTS
General Surgery Consult     Name:Deep Mendez  Age: 58 y.o.  Gender: male  : 1965  MRN: [id number]  Visit Number: [id number]  Admit Date: 3/12/2024  Date of Service: 24    Patient Care Team:  Sami Fisher MD as PCP - General (Internal Medicine)  Provider, No Known as PCP - Family Medicine    Reason for Consultation: appendicitis    Chief complaint : abdominal pain      History of Present Illness:     Deep Mendez is a 58 y.o. male patient with a past medical history significant for hypertension, diabetes mellitus type 2, on insulin therapy, status post left BKA, who presented to the emergency department overnight with the onset of severe abdominal pain late yesterday evening while having a bowel movement.  He describes the pain as being in the right lower abdomen and described as being sharp and radiating up through the abdomen into the chest.  He reported associated nausea without vomiting.  He denied fevers or chills.  He reports that he had been in his usual state of health earlier in the day.  However, upon further questioning, he endorses a several day history of alteration in his appetite, with decreased p.o. intake.  He denies any urinary symptoms.  He also endorses having small, hard stools for the several days preceding the onset of pain.    In the emergency department he was noted to be afebrile with an elevated blood pressure of 164/88.  He was nontachycardic.       Patient Active Problem List   Diagnosis    Perforated appendicitis    Type 2 diabetes mellitus, with long-term current use of insulin    Essential hypertension         Past Medical History:   Diagnosis Date    Diabetes mellitus     Hypertension        Past Surgical History:   Procedure Laterality Date    BELOW KNEE LEG AMPUTATION      EYE SURGERY         History reviewed. No pertinent family history.    Social History     Socioeconomic History    Marital status: Single   Tobacco Use    Smoking status: Former     Types:  Cigarettes   Substance and Sexual Activity    Alcohol use: Never    Drug use: Never    Sexual activity: Defer         Current Facility-Administered Medications:     acetaminophen (TYLENOL) tablet 650 mg, 650 mg, Oral, Q4H PRN **OR** acetaminophen (TYLENOL) 160 MG/5ML oral solution 650 mg, 650 mg, Oral, Q4H PRN **OR** acetaminophen (TYLENOL) suppository 650 mg, 650 mg, Rectal, Q4H PRN, Ryan Ozuna MD    sennosides-docusate (PERICOLACE) 8.6-50 MG per tablet 2 tablet, 2 tablet, Oral, BID PRN **AND** polyethylene glycol (MIRALAX) packet 17 g, 17 g, Oral, Daily PRN **AND** bisacodyl (DULCOLAX) EC tablet 5 mg, 5 mg, Oral, Daily PRN **AND** bisacodyl (DULCOLAX) suppository 10 mg, 10 mg, Rectal, Daily PRN, Ryan Ozuna MD    dextrose (D50W) (25 g/50 mL) IV injection 25 g, 25 g, Intravenous, Q15 Min PRN, Ryan Ozuna MD    dextrose (GLUTOSE) oral gel 15 g, 15 g, Oral, Q15 Min PRN, Ryan Ozuna MD    Enoxaparin Sodium (LOVENOX) syringe 40 mg, 40 mg, Subcutaneous, Q24H, Ryan Ozuna MD, 40 mg at 03/13/24 0614    glucagon (GLUCAGEN) injection 1 mg, 1 mg, Intramuscular, Q15 Min PRN, Ryan Ozuna MD    HYDROcodone-acetaminophen (NORCO) 7.5-325 MG per tablet 1 tablet, 1 tablet, Oral, Q4H PRN, Ryan Ozuna MD    insulin detemir (LEVEMIR) injection 8 Units, 8 Units, Subcutaneous, Daily, Ryan Ozuna MD, 8 Units at 03/13/24 0838    insulin regular (humuLIN R,novoLIN R) injection 2-9 Units, 2-9 Units, Subcutaneous, Q6H, Ryan Ozuna MD, 2 Units at 03/13/24 0217    Morphine sulfate (PF) injection 2 mg, 2 mg, Intravenous, Q4H PRN **AND** naloxone (NARCAN) injection 0.4 mg, 0.4 mg, Intravenous, Q5 Min PRN, Ryan Ozuna MD    ondansetron (ZOFRAN) injection 4 mg, 4 mg, Intravenous, Q6H PRN, Ryan Ozuna MD    Pharmacy to Dose enoxaparin (LOVENOX), , Does not apply, Continuous PRN, Ryan Ozuna MD    Pharmacy to Dose Zosyn, , Does not apply, Continuous PRN, Ryan Ozuna MD    piperacillin-tazobactam (ZOSYN) 4.5 g in  iso-osmotic dextrose 100 mL IVPB (premix), 4.5 g, Intravenous, Q8H, Ryan Ozuna MD    sodium chloride 0.9 % flush 10 mL, 10 mL, Intravenous, PRN, Calvin Alexis DO    sodium chloride 0.9 % flush 10 mL, 10 mL, Intravenous, Q12H, Ryan Ozuna MD, 10 mL at 03/13/24 0838    sodium chloride 0.9 % flush 10 mL, 10 mL, Intravenous, PRN, Ryan Ozuna MD    sodium chloride 0.9 % infusion 40 mL, 40 mL, Intravenous, PRN, Ryan Ozuna MD    sodium chloride 0.9 % infusion, 100 mL/hr, Intravenous, Continuous, Ryan Ozuna MD, Last Rate: 100 mL/hr at 03/13/24 0637, 100 mL/hr at 03/13/24 0637  No current outpatient medications on file.    (Not in a hospital admission)      No Known Allergies    Review of Systems   Constitutional:  Positive for appetite change. Negative for chills, fever and unexpected weight change.   HENT:  Negative for trouble swallowing and voice change.    Eyes:  Negative for visual disturbance.   Respiratory:  Negative for apnea, cough, chest tightness, shortness of breath and wheezing.    Cardiovascular:  Negative for chest pain, palpitations and leg swelling.   Gastrointestinal:  Positive for abdominal pain and constipation. Negative for abdominal distention, anal bleeding, blood in stool, diarrhea, nausea, rectal pain and vomiting.   Endocrine: Negative for cold intolerance and heat intolerance.   Genitourinary:  Negative for difficulty urinating, dysuria, flank pain, scrotal swelling and testicular pain.   Musculoskeletal:  Negative for back pain, gait problem and joint swelling.   Skin:  Negative for color change, rash and wound.   Neurological:  Negative for dizziness, syncope, speech difficulty, weakness, numbness and headaches.   Hematological:  Negative for adenopathy. Does not bruise/bleed easily.   Psychiatric/Behavioral:  Negative for confusion. The patient is not nervous/anxious.        OBJECTIVE:     Vital Signs  Temp:  [97.7 °F (36.5 °C)] 97.7 °F (36.5 °C)  Heart Rate:  [88-99] 91  Resp:   [18] 18  BP: (114-164)/(65-88) 118/73    No intake/output data recorded.  I/O last 3 completed shifts:  In: 1100 [IV Piggyback:1100]  Out: 800 [Urine:800]      Physical Exam:      General Appearance:    Alert, cooperative, in no acute distress   Head:    Normocephalic, without obvious abnormality, atraumatic   Eyes:            Lids and lashes normal, conjunctivae and sclerae normal, no icterus   Ears:    Ears appear intact with no abnormalities noted   Lungs:     Respirations regular, even and unlabored    Heart:    Regular rhythm and normal rate   Abdomen:     Obese, soft ttp in the RLQ, no scars   Extremities:   Left BKA   Pulses:   Pulses palpable and equal bilaterally   Skin:   No bleeding, bruising or rash   Neurologic:   AAOx3, no gross deficits         Results Review:  I have reviewed the entirety of the patient's clinical lab results.  I have also personally reviewed the patient's imaging    Narrative & Impression   FINAL REPORT     TECHNIQUE:  null     CLINICAL HISTORY:  RUQ abd pain     COMPARISON:  null     FINDINGS:  CT abdomen and pelvis with contrast     Comparison: None     Findings:     There is mild bibasilar atelectasis.     The liver, pancreas, spleen, and adrenal glands are unremarkable. There are few tiny stones in the gallbladder. Gallbladder appears otherwise normal. There is a punctate nonobstructing left renal stone. Right kidney is unremarkable. The proximal portion   of the appendix is dilated. The distal portion of the appendix is ruptured with an ill-defined developing abscess containing foci of gas measuring about 4.5 x 3.7 cm. There is reactive wall thickening of the distal sigmoid colon which passes immediately   adjacent to the collection. There is no bowel obstruction. Aorta is normal diameter. There are no enlarged lymph nodes. There is no acute fracture or suspicious lytic or sclerotic lesion. There is a chronic appearing mild superior endplate compression   fracture of L5.      IMPRESSION:  Impression:     1. Findings consistent with perforated appendicitis with ill-defined developing periappendiceal abscess.     2. Chronic findings as above.     Authenticated and Electronically Signed by Musa Esquivel MD on  03/13/2024 12:20:57 AM         Lab Results (last 72 hours)       Procedure Component Value Units Date/Time    POC Glucose Once [[id number]]  (Abnormal) Collected: 03/13/24 0833    Specimen: Blood Updated: 03/13/24 0835     Glucose 136 mg/dL      Comment: Serial Number: [id number]:  [id number]       Urinalysis, Microscopic Only - Urine, Clean Catch [[id number]]  (Abnormal) Collected: 03/13/24 0622    Specimen: Urine, Clean Catch Updated: 03/13/24 0700     RBC, UA 0-2 /HPF      WBC, UA 3-5 /HPF      Bacteria, UA Trace /HPF      Squamous Epithelial Cells, UA 0-2 /HPF      Hyaline Casts, UA None Seen /LPF      Methodology Manual Light Microscopy    Basic Metabolic Panel [[id number]]  (Abnormal) Collected: 03/13/24 0553    Specimen: Blood Updated: 03/13/24 0639     Glucose 143 mg/dL      BUN 13 mg/dL      Creatinine 1.30 mg/dL      Sodium 134 mmol/L      Potassium 4.1 mmol/L      Chloride 100 mmol/L      CO2 22.8 mmol/L      Calcium 8.4 mg/dL      BUN/Creatinine Ratio 10.0     Anion Gap 11.2 mmol/L      eGFR 64.1 mL/min/1.73     Narrative:      GFR Normal >60  Chronic Kidney Disease <60  Kidney Failure <15      Urinalysis With Microscopic If Indicated (No Culture) - Urine, Clean Catch [[id number]]  (Abnormal) Collected: 03/13/24 0622    Specimen: Urine, Clean Catch Updated: 03/13/24 0629     Color, UA Yellow     Appearance, UA Clear     pH, UA 5.5     Specific Gravity, UA >1.030     Glucose,  mg/dL (Trace)     Ketones, UA Negative     Bilirubin, UA Negative     Blood, UA Negative     Protein, UA 30 mg/dL (1+)     Leuk Esterase, UA Negative     Nitrite, UA Negative     Urobilinogen, UA 1.0 E.U./dL    CBC (No Diff) [[id number]]  (Abnormal) Collected: 03/13/24 0553    Specimen: Blood  Updated: 03/13/24 0609     WBC 12.39 10*3/mm3      RBC 4.29 10*6/mm3      Hemoglobin 12.4 g/dL      Hematocrit 36.8 %      MCV 85.8 fL      MCH 28.9 pg      MCHC 33.7 g/dL      RDW 13.3 %      RDW-SD 41.8 fl      MPV 9.4 fL      Platelets 225 10*3/mm3     POC Glucose Once [[id number]]  (Abnormal) Collected: 03/13/24 0521    Specimen: Blood Updated: 03/13/24 0524     Glucose 136 mg/dL      Comment: Serial Number: [id number]:  [id number]       Hemoglobin A1c [[id number]]  (Abnormal) Collected: 03/12/24 2040    Specimen: Blood Updated: 03/13/24 0227     Hemoglobin A1C 9.30 %     Narrative:      Hemoglobin A1C Ranges:    Increased Risk for Diabetes  5.7% to 6.4%  Diabetes                     >= 6.5%  Diabetic Goal                < 7.0%    POC Glucose Once [[id number]]  (Abnormal) Collected: 03/13/24 0205    Specimen: Blood Updated: 03/13/24 0208     Glucose 178 mg/dL      Comment: Serial Number: [id number]:  [id number]       Blood Culture - Blood, Arm, Left [[id number]] Collected: 03/13/24 0112    Specimen: Blood from Arm, Left Updated: 03/13/24 0123    Blood Culture - Blood, Hand, Right [[id number]] Collected: 03/13/24 0109    Specimen: Blood from Hand, Right Updated: 03/13/24 0123    [place] Draw [[id number]] Collected: 03/12/24 2040    Specimen: Blood Updated: 03/13/24 0045    Narrative:      The following orders were created for panel order [place] Draw.  Procedure                               Abnormality         Status                     ---------                               -----------         ------                     Green Top (Gel)[[id number]]                                  Final result               Lavender Top[[id number]]                                     Final result               Gold Top - SST[[id number]]                                   Final result               Gray Top[[id number]]                                         Final result               Light Blue Top[[id number]]                                    Final result                 Please view results for these tests on the individual orders.    Gray Top [[id number]] Collected: 03/12/24 2040    Specimen: Blood Updated: 03/13/24 0045     Extra Tube Hold for add-ons.     Comment: Auto resulted.       Single High Sensitivity Troponin T [[id number]]  (Abnormal) Collected: 03/12/24 2242    Specimen: Blood Updated: 03/12/24 2309     HS Troponin T 48 ng/L     Narrative:      High Sensitive Troponin T Reference Range:  <14.0 ng/L- Negative Female for AMI  <22.0 ng/L- Negative Male for AMI  >=14 - Abnormal Female indicating possible myocardial injury.  >=22 - Abnormal Male indicating possible myocardial injury.   Clinicians would have to utilize clinical acumen, EKG, Troponin, and serial changes to determine if it is an Acute Myocardial Infarction or myocardial injury due to an underlying chronic condition.         Green Top (Gel) [[id number]] Collected: 03/12/24 2040    Specimen: Blood Updated: 03/12/24 2146     Extra Tube Hold for add-ons.     Comment: Auto resulted.       Lavender Top [[id number]] Collected: 03/12/24 2040    Specimen: Blood Updated: 03/12/24 2146     Extra Tube hold for add-on     Comment: Auto resulted       Gold Top - SST [[id number]] Collected: 03/12/24 2040    Specimen: Blood Updated: 03/12/24 2146     Extra Tube Hold for add-ons.     Comment: Auto resulted.       Light Blue Top [[id number]] Collected: 03/12/24 2040    Specimen: Blood Updated: 03/12/24 2146     Extra Tube Hold for add-ons.     Comment: Auto resulted       Single High Sensitivity Troponin T [[id number]]  (Abnormal) Collected: 03/12/24 2040    Specimen: Blood Updated: 03/12/24 2115     HS Troponin T 45 ng/L     Narrative:      High Sensitive Troponin T Reference Range:  <14.0 ng/L- Negative Female for AMI  <22.0 ng/L- Negative Male for AMI  >=14 - Abnormal Female indicating possible myocardial injury.  >=22 - Abnormal Male indicating possible myocardial injury.   Clinicians would  have to utilize clinical acumen, EKG, Troponin, and serial changes to determine if it is an Acute Myocardial Infarction or myocardial injury due to an underlying chronic condition.         Lipase [[id number]]  (Normal) Collected: 03/12/24 2040    Specimen: Blood Updated: 03/12/24 2110     Lipase 16 U/L     Comprehensive Metabolic Panel [[id number]]  (Abnormal) Collected: 03/12/24 2040    Specimen: Blood Updated: 03/12/24 2110     Glucose 286 mg/dL      Comment: Glucose >180, Hemoglobin A1C recommended.        BUN 13 mg/dL      Creatinine 1.27 mg/dL      Sodium 132 mmol/L      Potassium 4.2 mmol/L      Chloride 95 mmol/L      CO2 24.8 mmol/L      Calcium 9.4 mg/dL      Total Protein 7.6 g/dL      Albumin 3.7 g/dL      ALT (SGPT) 17 U/L      AST (SGOT) 15 U/L      Alkaline Phosphatase 97 U/L      Total Bilirubin 0.6 mg/dL      Globulin 3.9 gm/dL      A/G Ratio 0.9 g/dL      BUN/Creatinine Ratio 10.2     Anion Gap 12.2 mmol/L      eGFR 65.9 mL/min/1.73     Narrative:      GFR Normal >60  Chronic Kidney Disease <60  Kidney Failure <15      CBC & Differential [[id number]]  (Abnormal) Collected: 03/12/24 2040    Specimen: Blood Updated: 03/12/24 2049    Narrative:      The following orders were created for panel order CBC & Differential.  Procedure                               Abnormality         Status                     ---------                               -----------         ------                     CBC Auto Differential[[id number]]        Abnormal            Final result                 Please view results for these tests on the individual orders.    CBC Auto Differential [[id number]]  (Abnormal) Collected: 03/12/24 2040    Specimen: Blood Updated: 03/12/24 2049     WBC 13.63 10*3/mm3      RBC 4.70 10*6/mm3      Hemoglobin 13.6 g/dL      Hematocrit 40.2 %      MCV 85.5 fL      MCH 28.9 pg      MCHC 33.8 g/dL      RDW 13.2 %      RDW-SD 41.6 fl      MPV 9.5 fL      Platelets 243 10*3/mm3      Neutrophil % 71.0 %       Lymphocyte % 16.2 %      Monocyte % 9.2 %      Eosinophil % 1.5 %      Basophil % 0.3 %      Immature Grans % 1.8 %      Neutrophils, Absolute 9.67 10*3/mm3      Lymphocytes, Absolute 2.21 10*3/mm3      Monocytes, Absolute 1.25 10*3/mm3      Eosinophils, Absolute 0.21 10*3/mm3      Basophils, Absolute 0.04 10*3/mm3      Immature Grans, Absolute 0.25 10*3/mm3      nRBC 0.0 /100 WBC                             ASSESSMENT/PLAN:      Perforated appendicitis    Type 2 diabetes mellitus, with long-term current use of insulin    Essential hypertension      Mr. Mendez is a 59 yo male patient who presented overnight with the acute onset of severe pain during a bowel movement in the right lower quadrant.  His workup ultimately demonstrated evidence of perforated appendicitis with early abscess formation.  I suspect that his appendicitis is actually been going on for a number of days, and that his visceral pain response is blunted secondary to his underlying diabetes mellitus.  I had a long discussion with the patient at the bedside regarding the options for management including continued nonoperative management with IV antibiotics, and bowel rest with reimaging in 24 to 48 hours to see if a drainable abscess has formed.  Alternatively, we discussed proceeding with operative intervention with hopes of achieving definitive management.  I explained to him that he is at high risk for conversion to an open procedure, or requiring a more extensive procedure than a simple laparoscopic appendectomy given the level of inflammation seen on his CT scan.  He also understands the likelihood of requiring drain placement intraoperatively.  Lastly, he understands that he will likely remain admitted to the facility for several days postoperatively for ongoing IV antibiotics and standard postoperative management depending on his intraoperative course.  At the conclusion of our discussion, he wished to move forward with operative intervention,  and understood the risks, benefits, and alternatives as discussed.  He had no additional questions, and provided his informed consent to proceed.      Yazmin Zayas MD  03/13/24  09:46 EDT

## 2024-03-13 NOTE — CASE MANAGEMENT/SOCIAL WORK
1553 Cm attempted multiple times to talk to pt to complete DCP, SDOH. Unable to complete due to pt being in OR most of the afternoon

## 2024-03-13 NOTE — ED PROVIDER NOTES
"Subjective  History of Present Illness:    Chief Complaint:   Chief Complaint   Patient presents with    Abdominal Pain      History of Present Illness: Deep Mendez is a 58 y.o. male who presents to the emergency department complaining of right upper quadrant abdominal pain.  Patient states he had a normal bowel movement this evening and then right afterwards had acute onset of right upper abdominal pain that radiated up into his chest.  Nausea but no vomiting.  History of diabetes with a left BKA hypertension.  No cardiac history.  No past abdominal surgeries.  Onset: This evening  Duration: Ongoing  Exacerbating / Alleviating factors: Pain improved by holding pressure to the right upper quadrant per patient  Associated symptoms: Nausea      Nurses Notes reviewed and agree, including vitals, allergies, social history and prior medical history.     Review of Systems   Constitutional: Negative.    HENT: Negative.     Eyes: Negative.    Respiratory: Negative.     Cardiovascular: Negative.    Gastrointestinal:  Positive for abdominal pain and nausea.   Genitourinary: Negative.    Musculoskeletal: Negative.    Skin: Negative.    Allergic/Immunologic: Negative.    Neurological: Negative.    Psychiatric/Behavioral: Negative.     All other systems reviewed and are negative.      Past Medical History:   Diagnosis Date    Diabetes mellitus     Hypertension        Allergies:    Patient has no known allergies.      Past Surgical History:   Procedure Laterality Date    BELOW KNEE LEG AMPUTATION      EYE SURGERY           Social History     Socioeconomic History    Marital status: Single   Tobacco Use    Smoking status: Former     Types: Cigarettes   Substance and Sexual Activity    Alcohol use: Never    Drug use: Never    Sexual activity: Defer         History reviewed. No pertinent family history.    Objective  Physical Exam:  /82   Pulse 92   Temp 97.7 °F (36.5 °C) (Oral)   Resp 18   Ht 182.9 cm (72\")   Wt 124 " kg (273 lb)   SpO2 95%   BMI 37.03 kg/m²      Physical Exam  Vitals and nursing note reviewed.   Constitutional:       General: He is not in acute distress.     Appearance: He is well-developed and normal weight. He is not ill-appearing, toxic-appearing or diaphoretic.   HENT:      Head: Normocephalic and atraumatic.   Eyes:      Extraocular Movements: Extraocular movements intact.   Cardiovascular:      Rate and Rhythm: Normal rate and regular rhythm.   Pulmonary:      Effort: Pulmonary effort is normal.   Abdominal:      General: Abdomen is flat. Bowel sounds are normal.      Palpations: Abdomen is soft.      Tenderness:  in the right upper quadrant There is no guarding or rebound.   Skin:     General: Skin is warm and dry.   Neurological:      General: No focal deficit present.      Mental Status: He is alert.   Psychiatric:         Mood and Affect: Mood normal.         Behavior: Behavior normal.           Procedures    ED Course:    ED Course as of 03/13/24 0055   Tue Mar 12, 2024   2137 WBC(!): 13.63 [TM]   2137 Lipase: 16 [TM]   2137 HS Troponin T(!): 45 [TM]      ED Course User Index  [TM] Ronaldo Gates PA-C       Lab Results (last 24 hours)       Procedure Component Value Units Date/Time    CBC & Differential [[id number]]  (Abnormal) Collected: 03/12/24 2040    Specimen: Blood Updated: 03/12/24 2049    Narrative:      The following orders were created for panel order CBC & Differential.  Procedure                               Abnormality         Status                     ---------                               -----------         ------                     CBC Auto Differential[[id number]]        Abnormal            Final result                 Please view results for these tests on the individual orders.    Comprehensive Metabolic Panel [[id number]]  (Abnormal) Collected: 03/12/24 2040    Specimen: Blood Updated: 03/12/24 2110     Glucose 286 mg/dL      Comment: Glucose >180, Hemoglobin A1C  recommended.        BUN 13 mg/dL      Creatinine 1.27 mg/dL      Sodium 132 mmol/L      Potassium 4.2 mmol/L      Chloride 95 mmol/L      CO2 24.8 mmol/L      Calcium 9.4 mg/dL      Total Protein 7.6 g/dL      Albumin 3.7 g/dL      ALT (SGPT) 17 U/L      AST (SGOT) 15 U/L      Alkaline Phosphatase 97 U/L      Total Bilirubin 0.6 mg/dL      Globulin 3.9 gm/dL      A/G Ratio 0.9 g/dL      BUN/Creatinine Ratio 10.2     Anion Gap 12.2 mmol/L      eGFR 65.9 mL/min/1.73     Narrative:      GFR Normal >60  Chronic Kidney Disease <60  Kidney Failure <15      Lipase [[id number]]  (Normal) Collected: 03/12/24 2040    Specimen: Blood Updated: 03/12/24 2110     Lipase 16 U/L     Single High Sensitivity Troponin T [[id number]]  (Abnormal) Collected: 03/12/24 2040    Specimen: Blood Updated: 03/12/24 2115     HS Troponin T 45 ng/L     Narrative:      High Sensitive Troponin T Reference Range:  <14.0 ng/L- Negative Female for AMI  <22.0 ng/L- Negative Male for AMI  >=14 - Abnormal Female indicating possible myocardial injury.  >=22 - Abnormal Male indicating possible myocardial injury.   Clinicians would have to utilize clinical acumen, EKG, Troponin, and serial changes to determine if it is an Acute Myocardial Infarction or myocardial injury due to an underlying chronic condition.         CBC Auto Differential [[id number]]  (Abnormal) Collected: 03/12/24 2040    Specimen: Blood Updated: 03/12/24 2049     WBC 13.63 10*3/mm3      RBC 4.70 10*6/mm3      Hemoglobin 13.6 g/dL      Hematocrit 40.2 %      MCV 85.5 fL      MCH 28.9 pg      MCHC 33.8 g/dL      RDW 13.2 %      RDW-SD 41.6 fl      MPV 9.5 fL      Platelets 243 10*3/mm3      Neutrophil % 71.0 %      Lymphocyte % 16.2 %      Monocyte % 9.2 %      Eosinophil % 1.5 %      Basophil % 0.3 %      Immature Grans % 1.8 %      Neutrophils, Absolute 9.67 10*3/mm3      Lymphocytes, Absolute 2.21 10*3/mm3      Monocytes, Absolute 1.25 10*3/mm3      Eosinophils, Absolute 0.21 10*3/mm3       Basophils, Absolute 0.04 10*3/mm3      Immature Grans, Absolute 0.25 10*3/mm3      nRBC 0.0 /100 WBC     Single High Sensitivity Troponin T [[id number]]  (Abnormal) Collected: 03/12/24 2242    Specimen: Blood Updated: 03/12/24 2309     HS Troponin T 48 ng/L     Narrative:      High Sensitive Troponin T Reference Range:  <14.0 ng/L- Negative Female for AMI  <22.0 ng/L- Negative Male for AMI  >=14 - Abnormal Female indicating possible myocardial injury.  >=22 - Abnormal Male indicating possible myocardial injury.   Clinicians would have to utilize clinical acumen, EKG, Troponin, and serial changes to determine if it is an Acute Myocardial Infarction or myocardial injury due to an underlying chronic condition.                  CT Abdomen Pelvis With Contrast    Result Date: 3/13/2024  FINAL REPORT TECHNIQUE: null CLINICAL HISTORY: RUQ abd pain COMPARISON: null FINDINGS: CT abdomen and pelvis with contrast Comparison: None Findings: There is mild bibasilar atelectasis. The liver, pancreas, spleen, and adrenal glands are unremarkable. There are few tiny stones in the gallbladder. Gallbladder appears otherwise normal. There is a punctate nonobstructing left renal stone. Right kidney is unremarkable. The proximal portion of the appendix is dilated. The distal portion of the appendix is ruptured with an ill-defined developing abscess containing foci of gas measuring about 4.5 x 3.7 cm. There is reactive wall thickening of the distal sigmoid colon which passes immediately adjacent to the collection. There is no bowel obstruction. Aorta is normal diameter. There are no enlarged lymph nodes. There is no acute fracture or suspicious lytic or sclerotic lesion. There is a chronic appearing mild superior endplate compression fracture of L5.     Impression: Impression: 1. Findings consistent with perforated appendicitis with ill-defined developing periappendiceal abscess. 2. Chronic findings as above. Authenticated and  Electronically Signed by Musa Esquivel MD on 03/13/2024 12:20:57 AM                           Medical Decision Making  Amount and/or Complexity of Data Reviewed  Labs: ordered. Decision-making details documented in ED Course.  Radiology: ordered.  ECG/medicine tests: ordered.    Risk  Prescription drug management.              Deep Mendez is a 58 y.o. male who presents to the emergency department for evaluation of right upper quadrant abdominal pain and nausea    Differential diagnosis includes cystitis, biliary colic, pancreatitis, among other etiologies.    CBC, CMP, ordered for further evaluation of the patient's presentation.    Chart review if available included outside testing, previous visits, prior labs, prior imaging, available notes from prior evaluations or visits with specialists, medication list, allergies, past medical history, past surgical history when applicable.    Patient was treated with   Medications   sodium chloride 0.9 % flush 10 mL (has no administration in time range)   sodium chloride 0.9 % bolus 1,000 mL (has no administration in time range)   piperacillin-tazobactam (ZOSYN) 4.5 g in iso-osmotic dextrose 100 mL IVPB (premix) (has no administration in time range)   morphine injection 4 mg (4 mg Intravenous Given 3/12/24 2240)   ondansetron (ZOFRAN) injection 4 mg (4 mg Intravenous Given 3/12/24 2240)   iopamidol (ISOVUE-300) 61 % injection 100 mL (100 mL Intravenous Given 3/12/24 2330)       Patient has perforated appendicitis on CT scan.  Did discuss case with Dr. Zayas requested Zosyn IV fluids admission to the hospitalist and she will see tomorrow.      Plan for disposition is Admission to the hospital.  Patient/family comfortable with and understanding of the plan.      Final diagnoses:   Perforated appendicitis          Ronaldo Gates PA-C  03/13/24 0055

## 2024-03-13 NOTE — CONSULTS
DM education attempted visit.  Pt. Has been moved to [facility] and is prepping for surgery.  Will re-attempt to see pt. Likely tomorrow.

## 2024-03-13 NOTE — ANESTHESIA POSTPROCEDURE EVALUATION
Patient: Deep Mendez    Procedure Summary       Date: 03/13/24 Room / Location: [facility] OR 3 /  GEN OR    Anesthesia Start: 1334 Anesthesia Stop: 1541    Procedure: APPENDECTOMY LAPAROSCOPIC (Abdomen) Diagnosis:       Perforated appendicitis      (Perforated appendicitis [K35.32])    Surgeons: Yazmin Zayas MD Provider: Bozena Keys CRNA    Anesthesia Type: general ASA Status: 2            Anesthesia Type: general    Vitals  No vitals data found for the desired time range.          Post Anesthesia Care and Evaluation    Patient location during evaluation: PACU  Patient participation: complete - patient participated  Level of consciousness: awake and alert  Pain score: 3  Pain management: satisfactory to patient    Airway patency: patent  Anesthetic complications: No anesthetic complications  PONV Status: none  Cardiovascular status: acceptable and stable  Respiratory status: acceptable  Hydration status: acceptable    Comments: Vitals signs as noted in nursing documentation as per protocol.

## 2024-03-13 NOTE — ANESTHESIA PREPROCEDURE EVALUATION
Anesthesia Evaluation     Patient summary reviewed and Nursing notes reviewed   no history of anesthetic complications:   NPO Solid Status: > 8 hours  NPO Liquid Status: > 8 hours           Airway   Mallampati: II  TM distance: >3 FB  Neck ROM: full  No difficulty expected  Dental - normal exam     Pulmonary - normal exam   (+) a smoker Former,  Cardiovascular - normal exam  Exercise tolerance: good (4-7 METS)    ECG reviewed    (+) hypertension well controlled less than 2 medications    ROS comment: EKG: SR    Neuro/Psych- negative ROS  GI/Hepatic/Renal/Endo    (+) diabetes mellitus type 2 well controlled    Musculoskeletal (-) negative ROS    Abdominal    Substance History - negative use     OB/GYN          Other - negative ROS       ROS/Med Hx Other: 12.4/36.8  K 4.1              Anesthesia Plan    ASA 2     general     (Risks and benefits discussed including risk of aspiration, recall and dental damage. All patient questions answered. Will continue with POC.)  intravenous induction     Anesthetic plan, risks, benefits, and alternatives have been provided, discussed and informed consent has been obtained with: patient.    Plan discussed with CRNA.    CODE STATUS:    Code Status (Patient has no pulse and is not breathing): CPR (Attempt to Resuscitate)  Medical Interventions (Patient has pulse or is breathing): Full Support

## 2024-03-13 NOTE — PROGRESS NOTES
[facility] HOSPITALIST    PROGRESS NOTE    Name:  Deep Mendez   Age:  58 y.o.  Sex:  male  :  1965  MRN:  [id number]   Visit Number:  [id number]  Admission Date:  3/12/2024  Date Of Service:  24  Primary Care Physician:  Sami Fisher MD     LOS: 0 days :    Chief Complaint:      Abdominal pain    Subjective:    Patient seen and examined with no family at bedside. He had just spoken with general surgeon Dr. Zayas and stated that he was going to have surgery today.  Updated that hospitalist team would follow.  Otherwise denies concerns or current pain.    Hospital Course:    Deep Mendez is a 58-year old male with history of hypertension and diabetes mellitus type 2 on insulin and Trulicity was brought to the emergency room by his friend with right lower quadrant abdominal pain that started earlier in the day.  Patient stated that he was in his usual state of health, went to the bathroom had a bowel movement, subsequently developed right upper and lower quadrant pain that was sharp and radiating up into the chest.  He had some nausea but no vomiting.  No prior history of abdominal surgeries.  He is diabetic and is status post below knee amputation.  No fevers.     In the emergency room, he was afebrile and hemodynamically stable saturating at 97% on room air but his blood pressure was elevated at 164/88.  Initial troponin 45 with a repeat at 48.  CMP was unremarkable except for a sodium of 132 and a glucose of 286.  Lipase normal at 16.  CBC unremarkable except for a white count of 13.6.  CT of the abdomen and pelvis done in the emergency room showed findings consistent with perforated appendicitis with ill-defined developing periappendiceal abscess.  Nonobstructing punctate left renal stones were noted.  Patient's condition was discussed with Dr. Zayas from surgical services who recommended antibiotics therapy and admission to the hospital.  Patient was given morphine, Zofran  and IV Zosyn in the emergency room.  He also received a liter of normal saline bolus and was  admitted to the medical floor with telemetry for perforated appendicitis.  Otherwise reassuring labs and vitals noted.  A1c noted to be 9.3 with prior 12.       Review of Systems:     All systems were reviewed and negative except as mentioned in subjective, assessment and plan.    Vital Signs:    Temp:  [97.7 °F (36.5 °C)] 97.7 °F (36.5 °C)  Heart Rate:  [88-99] 92  Resp:  [18] 18  BP: (114-164)/(65-88) 133/76    Intake and output:    I/O last 3 completed shifts:  In: 1100 [IV Piggyback:1100]  Out: 800 [Urine:800]  No intake/output data recorded.    Physical Examination:    General Appearance:  Alert and cooperative.  Chronically ill middle-age male.   Head:  Atraumatic and normocephalic.   Eyes: Conjunctivae and sclerae normal, no icterus. No pallor.   Throat: No oral lesions, no thrush, oral mucosa moist.   Neck: Supple, trachea midline, no thyromegaly.   Lungs:   Breath sounds heard bilaterally equally.  No wheezing or crackles. No Pleural rub or bronchial breathing.  On room air unlabored.   Heart:  Normal S1 and S2, no murmur, no gallop, no rub. No JVD.   Abdomen:   Normal bowel sounds, no masses, no organomegaly. Soft, nontender, nondistended, no rebound tenderness.  Slight discomfort with deep palpation right lower quadrant.   Extremities: Supple, right lower extremity edema, no cyanosis, no clubbing.  Left prosthetic leg noted below the knee amputation   Skin: No bleeding or rash.  Right lower extremity chronic venous stasis noted.   Neurologic: Alert and oriented x 3. No facial asymmetry. Moves all four limbs. No tremors.      Laboratory results:    Results from last 7 days   Lab Units 03/13/24  0553 03/12/24  2040   SODIUM mmol/L 134* 132*   POTASSIUM mmol/L 4.1 4.2   CHLORIDE mmol/L 100 95*   CO2 mmol/L 22.8 24.8   BUN mg/dL 13 13   CREATININE mg/dL 1.30* 1.27   CALCIUM mg/dL 8.4* 9.4   BILIRUBIN mg/dL  --  0.6  "  ALK PHOS U/L  --  97   ALT (SGPT) U/L  --  17   AST (SGOT) U/L  --  15   GLUCOSE mg/dL 143* 286*     Results from last 7 days   Lab Units 03/13/24  0553 03/12/24  2040   WBC 10*3/mm3 12.39* 13.63*   HEMOGLOBIN g/dL 12.4* 13.6   HEMATOCRIT % 36.8* 40.2   PLATELETS 10*3/mm3 225 243         Results from last 7 days   Lab Units 03/12/24  2242 03/12/24  2040   HSTROP T ng/L 48* 45*         No results for input(s): \"PHART\", \"WPJ9OJN\", \"PO2ART\", \"OVP5UBU\", \"BASEEXCESS\" in the last 8760 hours.   I have reviewed the patient's laboratory results.    Radiology results:    CT Abdomen Pelvis With Contrast    Result Date: 3/13/2024  FINAL REPORT TECHNIQUE: null CLINICAL HISTORY: RUQ abd pain COMPARISON: null FINDINGS: CT abdomen and pelvis with contrast Comparison: None Findings: There is mild bibasilar atelectasis. The liver, pancreas, spleen, and adrenal glands are unremarkable. There are few tiny stones in the gallbladder. Gallbladder appears otherwise normal. There is a punctate nonobstructing left renal stone. Right kidney is unremarkable. The proximal portion of the appendix is dilated. The distal portion of the appendix is ruptured with an ill-defined developing abscess containing foci of gas measuring about 4.5 x 3.7 cm. There is reactive wall thickening of the distal sigmoid colon which passes immediately adjacent to the collection. There is no bowel obstruction. Aorta is normal diameter. There are no enlarged lymph nodes. There is no acute fracture or suspicious lytic or sclerotic lesion. There is a chronic appearing mild superior endplate compression fracture of L5.     Impression: Impression: 1. Findings consistent with perforated appendicitis with ill-defined developing periappendiceal abscess. 2. Chronic findings as above. Authenticated and Electronically Signed by Musa Esquivel MD on 03/13/2024 12:20:57 AM   I have reviewed the patient's radiology reports.    Medication Review:     I have reviewed the patient's " active and prn medications.     Problem List:      Perforated appendicitis    Type 2 diabetes mellitus, with long-term current use of insulin    Essential hypertension      Assessment    Acute perforated appendicitis with abscess, POA.  Diabetes mellitus type 2 with hyperglycemia, POA.  Essential hypertension.  Obesity with a BMI of 37.  Status post left BKA with prosthesis      Plan:    Acute perforated appendicitis.  - Patient will be continued on IV antibiotics therapy with Zosyn.  - Morphine and Zofran for pain control and nausea.  - Dr. Zayas following.  Appreciate recommendations.  - We will keep him n.p.o. with ice chips until evaluated by surgery.  - Continue IV fluids      Diabetes mellitus type 2.  - We will place him on low-dose Levemir 8 units daily-and uptitrate as needed for hyperglycemia.  - Subcutaneous insulin protocol for coverage.  - A1c 9.3.    I have reviewed the copied text and it is accurate as of 3/13/2024      DVT Prophylaxis: Enoxaparin  Code Status: Full  Diet: N.p.o.  Discharge Plan: Several days.    Janel Lorenzo, APRN  03/13/24  11:52 EDT    Dictated utilizing Dragon dictation.

## 2024-03-13 NOTE — PROGRESS NOTES
Pharmacokinetic Consult - Piperacillin-tazobactam Dosing    Deep Mendez is a 58 y.o. male who has been consulted to dose piperacillin-tazobactam for  intra-abdominal infection .    Current Antimicrobial Therapy    Anti-Infectives (From admission, onward)      Ordered     Dose/Rate Route Frequency Start Stop    03/13/24 0437  piperacillin-tazobactam (ZOSYN) 4.5 g in iso-osmotic dextrose 100 mL IVPB (premix)        Ordering Provider: Ryan Ozuna MD    4.5 g  over 4 Hours Intravenous Every 8 Hours 03/13/24 0800 03/18/24 0759    03/13/24 0159  Pharmacy to Dose Zosyn        Ordering Provider: Ryan Ozuna MD     Does not apply Continuous PRN 03/13/24 0159 03/18/24 0158    03/13/24 0052  piperacillin-tazobactam (ZOSYN) 4.5 g in iso-osmotic dextrose 100 mL IVPB (premix)        Ordering Provider: Ronaldo Gates PA-C    4.5 g  200 mL/hr over 0.5 Hours Intravenous Once 03/13/24 0108 03/13/24 0207            Microbiology Results (last 10 days)       ** No results found for the last 240 hours. **             Allergies  Patient has no known allergies.    Relevant clinical data and objective history reviewed:  Creatinine   Date Value Ref Range Status   03/12/2024 1.27 0.76 - 1.27 mg/dL Final     Estimated Creatinine Clearance: 87.3 mL/min (by C-G formula based on SCr of 1.27 mg/dL).  No intake/output data recorded.  Patient weight: 124 kg (273 lb)    Asessment/Plan    Initiate Piperacillin-tazobactam 4.5 g IV every 8 hours  Pharmacy will monitor Mr. Mendez's renal function and clinical status and adjust the piperacillin-tazobactam dose and/or frequency as needed.    Thank you,  Stacy Peng [facility],PharmD  3/13/2024  04:38 EDT

## 2024-03-13 NOTE — ANESTHESIA PROCEDURE NOTES
Airway  Urgency: elective    Date/Time: 3/13/2024 1:44 PM    General Information and Staff    Patient location during procedure: OR  CRNA/CAA: Bozena Keys CRNA    Indications and Patient Condition  Indications for airway management: airway protection    Preoxygenated: yes  Mask difficulty assessment: 0 - not attempted    Final Airway Details  Final airway type: endotracheal airway      Successful airway: ETT  Cuffed: yes   Successful intubation technique: video laryngoscopy and RSI  Facilitating devices/methods: intubating stylet  Endotracheal tube insertion site: oral  Blade: Glidescope  Blade size: 3  ETT size (mm): 7.5  Cormack-Lehane Classification: grade I - full view of glottis  Placement verified by: chest auscultation and capnometry   Cuff volume (mL): 6  Measured from: lips  ETT/EBT  to lips (cm): 23  Number of attempts at approach: 1  Assessment: lips, teeth, and gum same as pre-op and atraumatic intubation    Additional Comments  +ETCO2, BBS, small glottic opening, 7.5 placed without complication

## 2024-03-13 NOTE — OP NOTE
PROCEDURE DATE: 03/13/2024    SURGEON: Yazmin Zayas MD, FACS    PREOPERATIVE DIAGNOSIS: Acute appendicitis with perforation, localized peritonitis, abscess, and gangrene     POSTOPERATIVE DIAGNOSIS: same    PROCEDURE: Laparoscopic Appendectomy    ANESTHESIA: GETA    EBL: 25mL    IMPLANTS:  Implant Name Type Inv. Item Serial No.  Lot No. LRB No. Used Action   CLIPAPPLR M/ ENDO LIGAMAX5 5MM 33CM MD/LG - [id number] Implant CLIPAPPLR M/ ENDO LIGAMAX5 5MM 33CM MD/LG  [employer]  DIV OF J AND J [id number] N/A 1 Implanted   RELOAD ECHEL[employer] ENDOPATH GST 45MM LULÚ - [id number] Implant RELOAD ECHELON ENDOPATH GST 45MM LULÚ  [employer]  DIV OF J AND J [id number]/A 1 Implanted     SPECIMENS:       Specimens       ID Source Type Tests Collected By Collected At Frozen?    A Large Intestine, Appendix Tissue TISSUE EXAM, [facility] ([facility], [facility], [facility])   Kaye Rocha RN 3/13/24 5619 No    Description: perforated appendix     INDICATIONS FOR THE PROCEDURE:  Mr. Mendez is a 59 yo male patient who presented overnight with the acute onset of severe pain during a bowel movement in the right lower quadrant.  His workup ultimately demonstrated evidence of perforated appendicitis with early abscess formation. I recommended appendectomy for definitive management.  We also discussed nonoperative management with possible drain placement.  The contents of this conversation as documented in the surgical consultation note.  Ultimately the patient was agreeable to proceeding with surgical intervention, and provided informed consent to proceed.  He is now being brought to the operating room for the same.    DESCRIPTION OF THE PROCEDURE:  The patient was seen and examined in the preoperative holding area on the day of surgery and there are no changes to the medical history.  The patient was then taken to the operating room and placed supine on the operating table where a timeout is performed using the WHO checklist.  The patient  received appropriate preoperative antibiotics as well as subcutaneous heparin for DVT prophylaxis.    Following the satisfactory induction of general anesthesia, a Ledezma catheter was inserted for decompression of the patient's bladder.  The patient's abdomen was then prepped and draped in the usual sterile fashion.  A vertical incision was made just above the umbilicus and was carried through the soft tissue to the level of the fascia.  The fascia was grasped and elevated between Kocher clamps and incised sharply with a knife.  Entry was confirmed into the peritoneum visually and there was no bowel visible in the vicinity of this incision.  Two stay sutures of 0 Vicryl were placed in either side of the fascia and a Champagne cannula was inserted under direct visualization.  The sutures were used to secure the cannula.    The abdomen was insufflated with carbon dioxide to a pressure of 15 mmHg and the laparoscope was introduced.  The abdomen was inspected and no injuries were noted from initial trocar placement.  Following this, 2 additional 5 mm ports were placed in the left lateral and suprapubic positions under direct visualization.  The patient was placed into the Trendelenburg position with the left side down.  There was dense inflammatory change in the right lower quadrant, and the bowel loops were noted to be somewhat dilated in this region secondary to the inflammation.  The suction cannula was used to bluntly peeled the small bowel and cecum away from the lateral abdominal sidewall at which time an abscess cavity was entered, and a copious amount of david pus under pressure was revealed.  This was quickly evacuated from the field, and further blunt dissection was used to unroofed the entire abscess cavity.  Eventually, I was able to identify the remnant of a nearly completely necrotic appendix.  There was obvious evidence of appendiceal perforation.  There was a tremendous amount of inflammatory reaction within  the abscess cavity with a thick fibrinous rind.  Blunt dissection was used to separate the structures which had become adherent to the abscess cavity, in hopes of identifying and restoring normal anatomy.  I was able to identify the fold of Treves and the associated ileocecal junction.  Further blunt dissection was used to elevate the residual necrotic appendix from the abscess cavity for removal.  In doing so, the appendicular artery was identified, and secured with application of clips x 2.  The mesoappendix was also necrotic such that it could not be transected with a linear cutting stapler.  Eventually, I was able to identify that there was some remaining appendiceal base that seemed to have potential for transection with a stapling device, while allowing for enough healthy tissue to maintain a staple line on the cecum.  In order to facilitate this, the hook cautery was used to mobilize the cecum by dividing the lateral attachments along the white line of Toldt.  The avascular plane was entered, and blunt dissection was used to mobilize the cecum lateral to medial.  Once this was done, I was able to gently grasp the remaining appendiceal base, and passed a linear cutting stapler across this area successfully.  A single fire of the linear cutting stapler was then used to transect the residual appendiceal base, and after the stapler was fired the staple line was inspected and appeared secure.  I additionally secured the staple line with application of 5 mm clips.  The staple lines were inspected and hemostasis was found to be acceptable.  The appendix was then placed into an Endo Catch bag.  The operative field was then copiously irrigated and inspected for hemostasis.  This was found to be acceptable.  A brief survey of the remainder of the peritoneal cavity revealed no additional abnormalities.  A 10 mm flat BRITTANIE drain was placed into the right lower quadrant and brought out the left lower quadrant port site.  It  was secured to the skin with a 3-0 Prolene suture and connected to bulb suction.  The suprapubic the 5mm abdominal trocar were removed under direct visualization and no bleeding was noted.  The laparoscope was withdrawn and the abdomen was desufflated.  The Champagne cannula was removed out of the umbilical port site followed by the appendix which was completely contained within the Endo Catch bag.  This was passed off the field as specimen.  The fascia of the umbilical port site was then closed using a 0 Vicryl suture placed in a figure-of-eight fashion ×2.  The skin of all incisions was closed using a 5-0 PDS suture placed in a subcuticular fashion.  0.25% Marcaine was injected into the wounds for postoperative analgesia.  Mastisol and steri strips were applied to the wounds and covered with dry sterile dressings.    There were no immediate complications noted and the procedure was well tolerated by the patient.  All sponge, needle,  and instrument  counts were correct at the conclusion of procedure.  Dr. aZyas was present scrubbed for the procedure and its entirety.  The patient was awakened from anesthesia and taken to the recovery room in good condition.

## 2024-03-13 NOTE — PROGRESS NOTES
"Pharmacy Consult - Enoxaparin Dosing    Deep Mendez is a 58 y.o. male who has been consulted to dose enoxaparin for VTE prophylaxis.     Allergies    Patient has no known allergies.    Relevant clinical data and objective history reviewed:     [Ht: 182.9 cm (72\"); Wt: 124 kg (273 lb)]  Body mass index is 37.03 kg/m².    Estimated Creatinine Clearance: 87.3 mL/min (by C-G formula based on SCr of 1.27 mg/dL).    Results from last 7 days   Lab Units 03/12/24  2040   HEMOGLOBIN g/dL 13.6   HEMATOCRIT % 40.2   PLATELETS 10*3/mm3 243   CREATININE mg/dL 1.27       Asessment/Plan    Initiate Enoxaparin 40 mg SQ every 24 hours  Pharmacy will monitor Mr. Mendez's renal function and clinical status and adjust the enoxaparin dose and/or frequency as needed.    Thank you,  Stacy Peng RP,PharmD  3/13/2024  04:44 EDT      "

## 2024-03-14 PROBLEM — K35.32 RUPTURE OF APPENDIX: Status: ACTIVE | Noted: 2024-03-14

## 2024-03-14 LAB
ANION GAP SERPL CALCULATED.3IONS-SCNC: 9.1 MMOL/L (ref 5–15)
BASOPHILS # BLD AUTO: 0.04 10*3/MM3 (ref 0–0.2)
BASOPHILS NFR BLD AUTO: 0.3 % (ref 0–1.5)
BUN SERPL-MCNC: 13 MG/DL (ref 6–20)
BUN/CREAT SERPL: 10.5 (ref 7–25)
CALCIUM SPEC-SCNC: 8.7 MG/DL (ref 8.6–10.5)
CHLORIDE SERPL-SCNC: 101 MMOL/L (ref 98–107)
CO2 SERPL-SCNC: 22.9 MMOL/L (ref 22–29)
CREAT SERPL-MCNC: 1.24 MG/DL (ref 0.76–1.27)
DEPRECATED RDW RBC AUTO: 41.1 FL (ref 37–54)
EGFRCR SERPLBLD CKD-EPI 2021: 67.8 ML/MIN/1.73
EOSINOPHIL # BLD AUTO: 0.01 10*3/MM3 (ref 0–0.4)
EOSINOPHIL NFR BLD AUTO: 0.1 % (ref 0.3–6.2)
ERYTHROCYTE [DISTWIDTH] IN BLOOD BY AUTOMATED COUNT: 13.2 % (ref 12.3–15.4)
GLUCOSE BLDC GLUCOMTR-MCNC: 192 MG/DL (ref 70–130)
GLUCOSE BLDC GLUCOMTR-MCNC: 226 MG/DL (ref 70–130)
GLUCOSE BLDC GLUCOMTR-MCNC: 271 MG/DL (ref 70–130)
GLUCOSE BLDC GLUCOMTR-MCNC: 299 MG/DL (ref 70–130)
GLUCOSE BLDC GLUCOMTR-MCNC: 451 MG/DL (ref 70–130)
GLUCOSE SERPL-MCNC: 228 MG/DL (ref 65–99)
HCT VFR BLD AUTO: 40.8 % (ref 37.5–51)
HGB BLD-MCNC: 13.8 G/DL (ref 13–17.7)
IMM GRANULOCYTES # BLD AUTO: 0.12 10*3/MM3 (ref 0–0.05)
IMM GRANULOCYTES NFR BLD AUTO: 0.8 % (ref 0–0.5)
LYMPHOCYTES # BLD AUTO: 1.67 10*3/MM3 (ref 0.7–3.1)
LYMPHOCYTES NFR BLD AUTO: 11.4 % (ref 19.6–45.3)
MCH RBC QN AUTO: 29.2 PG (ref 26.6–33)
MCHC RBC AUTO-ENTMCNC: 33.8 G/DL (ref 31.5–35.7)
MCV RBC AUTO: 86.3 FL (ref 79–97)
MONOCYTES # BLD AUTO: 1.01 10*3/MM3 (ref 0.1–0.9)
MONOCYTES NFR BLD AUTO: 6.9 % (ref 5–12)
NEUTROPHILS NFR BLD AUTO: 11.79 10*3/MM3 (ref 1.7–7)
NEUTROPHILS NFR BLD AUTO: 80.5 % (ref 42.7–76)
NRBC BLD AUTO-RTO: 0 /100 WBC (ref 0–0.2)
PLATELET # BLD AUTO: 251 10*3/MM3 (ref 140–450)
PMV BLD AUTO: 9.8 FL (ref 6–12)
POTASSIUM SERPL-SCNC: 4.9 MMOL/L (ref 3.5–5.2)
RBC # BLD AUTO: 4.73 10*6/MM3 (ref 4.14–5.8)
SODIUM SERPL-SCNC: 133 MMOL/L (ref 136–145)
WBC NRBC COR # BLD AUTO: 14.64 10*3/MM3 (ref 3.4–10.8)

## 2024-03-14 PROCEDURE — 80048 BASIC METABOLIC PNL TOTAL CA: CPT | Performed by: SURGERY

## 2024-03-14 PROCEDURE — 99232 SBSQ HOSP IP/OBS MODERATE 35: CPT | Performed by: NURSE PRACTITIONER

## 2024-03-14 PROCEDURE — 63710000001 INSULIN DETEMIR PER 5 UNITS: Performed by: SURGERY

## 2024-03-14 PROCEDURE — 97166 OT EVAL MOD COMPLEX 45 MIN: CPT

## 2024-03-14 PROCEDURE — 82948 REAGENT STRIP/BLOOD GLUCOSE: CPT

## 2024-03-14 PROCEDURE — 99024 POSTOP FOLLOW-UP VISIT: CPT | Performed by: SURGERY

## 2024-03-14 PROCEDURE — 25010000002 PIPERACILLIN SOD-TAZOBACTAM PER 1 G: Performed by: SURGERY

## 2024-03-14 PROCEDURE — 25010000002 MORPHINE PER 10 MG: Performed by: SURGERY

## 2024-03-14 PROCEDURE — 63710000001 INSULIN REGULAR HUMAN PER 5 UNITS: Performed by: SURGERY

## 2024-03-14 PROCEDURE — 97162 PT EVAL MOD COMPLEX 30 MIN: CPT

## 2024-03-14 PROCEDURE — 25810000003 SODIUM CHLORIDE 0.9 % SOLUTION: Performed by: SURGERY

## 2024-03-14 PROCEDURE — 85025 COMPLETE CBC W/AUTO DIFF WBC: CPT | Performed by: SURGERY

## 2024-03-14 PROCEDURE — 25010000002 ENOXAPARIN PER 10 MG: Performed by: SURGERY

## 2024-03-14 RX ORDER — INSULIN ASPART 100 [IU]/ML
2-9 INJECTION, SOLUTION INTRAVENOUS; SUBCUTANEOUS
Status: DISCONTINUED | OUTPATIENT
Start: 2024-03-15 | End: 2024-03-18 | Stop reason: HOSPADM

## 2024-03-14 RX ORDER — LISINOPRIL 2.5 MG/1
2.5 TABLET ORAL DAILY
COMMUNITY

## 2024-03-14 RX ORDER — METOPROLOL SUCCINATE 25 MG/1
25 TABLET, EXTENDED RELEASE ORAL
Status: DISCONTINUED | OUTPATIENT
Start: 2024-03-14 | End: 2024-03-18 | Stop reason: HOSPADM

## 2024-03-14 RX ORDER — DAPAGLIFLOZIN 10 MG/1
1 TABLET, FILM COATED ORAL DAILY
COMMUNITY

## 2024-03-14 RX ORDER — ATORVASTATIN CALCIUM 40 MG/1
40 TABLET, FILM COATED ORAL DAILY
COMMUNITY

## 2024-03-14 RX ORDER — LOSARTAN POTASSIUM 50 MG/1
50 TABLET ORAL
Status: DISCONTINUED | OUTPATIENT
Start: 2024-03-14 | End: 2024-03-14

## 2024-03-14 RX ORDER — FUROSEMIDE 40 MG/1
40 TABLET ORAL NIGHTLY PRN
COMMUNITY

## 2024-03-14 RX ORDER — ATORVASTATIN CALCIUM 40 MG/1
40 TABLET, FILM COATED ORAL DAILY
Status: DISCONTINUED | OUTPATIENT
Start: 2024-03-14 | End: 2024-03-18 | Stop reason: HOSPADM

## 2024-03-14 RX ORDER — FUROSEMIDE 40 MG/1
40 TABLET ORAL DAILY
Status: DISCONTINUED | OUTPATIENT
Start: 2024-03-15 | End: 2024-03-16

## 2024-03-14 RX ORDER — LOSARTAN POTASSIUM 25 MG/1
25 TABLET ORAL
Status: DISCONTINUED | OUTPATIENT
Start: 2024-03-15 | End: 2024-03-16

## 2024-03-14 RX ADMIN — SODIUM CHLORIDE 100 ML/HR: 9 INJECTION, SOLUTION INTRAVENOUS at 05:01

## 2024-03-14 RX ADMIN — MORPHINE SULFATE 2 MG: 2 INJECTION, SOLUTION INTRAMUSCULAR; INTRAVENOUS at 16:34

## 2024-03-14 RX ADMIN — HYDROCODONE BITARTRATE AND ACETAMINOPHEN 1 TABLET: 7.5; 325 TABLET ORAL at 00:16

## 2024-03-14 RX ADMIN — INSULIN DETEMIR 8 UNITS: 100 INJECTION, SOLUTION SUBCUTANEOUS at 09:02

## 2024-03-14 RX ADMIN — HUMAN INSULIN 6 UNITS: 100 INJECTION, SOLUTION SUBCUTANEOUS at 00:16

## 2024-03-14 RX ADMIN — HUMAN INSULIN 6 UNITS: 100 INJECTION, SOLUTION SUBCUTANEOUS at 21:23

## 2024-03-14 RX ADMIN — Medication 10 ML: at 09:06

## 2024-03-14 RX ADMIN — HUMAN INSULIN 6 UNITS: 100 INJECTION, SOLUTION SUBCUTANEOUS at 17:05

## 2024-03-14 RX ADMIN — HUMAN INSULIN 6 UNITS: 100 INJECTION, SOLUTION SUBCUTANEOUS at 12:39

## 2024-03-14 RX ADMIN — ENOXAPARIN SODIUM 40 MG: 40 INJECTION SUBCUTANEOUS at 06:29

## 2024-03-14 RX ADMIN — PIPERACILLIN SODIUM AND TAZOBACTAM SODIUM 4.5 G: 4; .5 INJECTION, SOLUTION INTRAVENOUS at 02:26

## 2024-03-14 RX ADMIN — METOPROLOL SUCCINATE 25 MG: 25 TABLET, EXTENDED RELEASE ORAL at 12:38

## 2024-03-14 RX ADMIN — LOSARTAN POTASSIUM 50 MG: 50 TABLET, FILM COATED ORAL at 12:39

## 2024-03-14 RX ADMIN — PIPERACILLIN SODIUM AND TAZOBACTAM SODIUM 4.5 G: 4; .5 INJECTION, SOLUTION INTRAVENOUS at 10:13

## 2024-03-14 RX ADMIN — PIPERACILLIN SODIUM AND TAZOBACTAM SODIUM 4.5 G: 4; .5 INJECTION, SOLUTION INTRAVENOUS at 17:05

## 2024-03-14 RX ADMIN — HYDROCODONE BITARTRATE AND ACETAMINOPHEN 1 TABLET: 7.5; 325 TABLET ORAL at 06:29

## 2024-03-14 RX ADMIN — HUMAN INSULIN 4 UNITS: 100 INJECTION, SOLUTION SUBCUTANEOUS at 06:29

## 2024-03-14 RX ADMIN — ATORVASTATIN CALCIUM 40 MG: 40 TABLET, FILM COATED ORAL at 16:09

## 2024-03-14 RX ADMIN — SODIUM CHLORIDE 100 ML/HR: 9 INJECTION, SOLUTION INTRAVENOUS at 14:21

## 2024-03-14 NOTE — PROGRESS NOTES
LOS: 0 days   Patient Care Team:  Sami Fisher MD as PCP - General (Internal Medicine)  Provider, No Known as PCP - Family Medicine    Chief Complaint:  POD#1    Subjective     Interval History:   No acute events reported overnight.  Patient reports adequate pain control and states that he feels much better compared to the time of presentation yesterday.  He reports that he is tolerating clear liquids, and is anxious to have his diet advanced.  He has not had a bowel movement, and reports he has only passed flatus once or twice.  He tells me that he has not voided since coming to the floor from the recovery room last evening.  He does not feel the need to empty his bladder.    Objective     Vital Signs  Temp:  [97.1 °F (36.2 °C)-98.8 °F (37.1 °C)] 97.7 °F (36.5 °C)  Heart Rate:  [] 96  Resp:  [14-18] 18  BP: (118-167)/(66-98) 166/91    BRITTANIE 75mL    Physical Exam:     General Appearance:    Alert, cooperative, in no acute distress   Head:    Normocephalic, without obvious abnormality, atraumatic   Eyes:            Lids and lashes normal, conjunctivae and sclerae normal, no icterus   Ears:    Ears appear intact with no abnormalities noted   Lungs:     Respirations regular, even and unlabored    Heart:    Regular rhythm and normal rate   Abdomen:     Soft, minimally tender.  Incisions clean and dry with dressings in place. BRITTANIE serosanguineous.   Extremities:  Left BKA   Skin:   No bleeding, bruising or rash   Neurologic:   AAOx3, no gross deficits          Results Review:    I reviewed the patient's new clinical results.    Results from last 7 days   Lab Units 03/14/24  0707 03/13/24  0553 03/12/24  2040   SODIUM mmol/L 133* 134* 132*   POTASSIUM mmol/L 4.9 4.1 4.2   CHLORIDE mmol/L 101 100 95*   CO2 mmol/L 22.9 22.8 24.8   BUN mg/dL 13 13 13   CREATININE mg/dL 1.24 1.30* 1.27   CALCIUM mg/dL 8.7 8.4* 9.4   BILIRUBIN mg/dL  --   --  0.6   ALK PHOS U/L  --   --  97   ALT (SGPT) U/L  --   --  17   AST (SGOT) U/L   --   --  15   GLUCOSE mg/dL 228* 143* 286*       Results from last 7 days   Lab Units 03/14/24  0707 03/13/24  0553 03/12/24 2040   WBC 10*3/mm3 14.64* 12.39* 13.63*   HEMOGLOBIN g/dL 13.8 12.4* 13.6   HEMATOCRIT % 40.8 36.8* 40.2   PLATELETS 10*3/mm3 251 225 243         Medication Review:   Scheduled Meds:enoxaparin, 40 mg, Subcutaneous, Q24H  insulin detemir, 8 Units, Subcutaneous, Daily  insulin regular, 2-9 Units, Subcutaneous, Q6H  piperacillin-tazobactam, 4.5 g, Intravenous, Q8H  sodium chloride, 10 mL, Intravenous, Q12H      Continuous Infusions:Pharmacy to Dose enoxaparin (LOVENOX),   Pharmacy to Dose Zosyn,   sodium chloride, 100 mL/hr, Last Rate: 100 mL/hr (03/14/24 0501)      PRN Meds:.  acetaminophen **OR** acetaminophen **OR** acetaminophen    senna-docusate sodium **AND** polyethylene glycol **AND** bisacodyl **AND** bisacodyl    dextrose    dextrose    glucagon (human recombinant)    HYDROcodone-acetaminophen    Morphine **AND** naloxone    ondansetron    Pharmacy to Dose enoxaparin (LOVENOX)    Pharmacy to Dose Zosyn    sodium chloride    sodium chloride    sodium chloride      Assessment & Plan       Acute appendicitis with perforation, localized peritonitis, abscess, and gangrene    Type 2 diabetes mellitus, with long-term current use of insulin    Essential hypertension    Mr. Mendez is POD#1 from a laparoscopic appendectomy with drainage of a large intraabdominal abscess and drain placement.  He is doing well thus far.  I discussed the intraoperative findings with the patient and his family in detail and answered all of their questions.  Continue zosyn for now.  Maintain BRITTANIE drain.  Continue clear liquid diet.  Will advance to full liquids for breakfast tomorrow and reassess.  Given lack of void, will ask the nursing staff to perform and bladder scan. May require reinsertion of a hernández catheter.  Continue routine postoperative care including DVT ppx.          Yazmin Zayas MD  03/14/24  08:54  EDT

## 2024-03-14 NOTE — CONSULTS
Patient: Deep Mendez  Procedure(s):  APPENDECTOMY LAPAROSCOPIC  Anesthesia type: general    Patient location: [facility] Surgical Floor  Last vitals:   Vitals:    03/14/24 0722   BP: 166/91   Pulse: 96   Resp: 18   Temp: 97.7 °F (36.5 °C)   SpO2: 97%     Level of consciousness: awake, alert, and oriented    Post-anesthesia pain: adequate analgesia  Airway patency: patent  Respiratory: unassisted  Cardiovascular: stable and blood pressure at baseline  Hydration: euvolemic    Anesthetic complications: no

## 2024-03-14 NOTE — PROGRESS NOTES
[facility]    Name:  Deep Mendez   Age:  58 y.o.  Sex:  male  :  1965  MRN:  [id number]   Visit Number:  [id number]  Admission Date:  3/12/2024  Date Of Service:  24  Primary Care Physician:  Sami Fisher MD     LOS: 0 days :    Chief Complaint:      Abdominal pain    Subjective:    Patient seen and examined with no family at bedside.  He is status post laparoscopic appendectomy per Dr. Zayas.  He is tolerating clear liquids.  States that pain is controlled.  Denied being on antihypertensives in the past.  Only takes diabetes medications.  Denies further concerns.    Hospital Course:    Deep Mendez is a 58-year old male with history of hypertension and diabetes mellitus type 2 on insulin and Trulicity was brought to the emergency room by his friend with right lower quadrant abdominal pain that started earlier in the day.  Patient stated that he was in his usual state of health, went to the bathroom had a bowel movement, subsequently developed right upper and lower quadrant pain that was sharp and radiating up into the chest.  He had some nausea but no vomiting.  No prior history of abdominal surgeries.  He is diabetic and is status post below knee amputation.  No fevers.     In the emergency room, he was afebrile and hemodynamically stable saturating at 97% on room air but his blood pressure was elevated at 164/88.  Initial troponin 45 with a repeat at 48.  CMP was unremarkable except for a sodium of 132 and a glucose of 286.  Lipase normal at 16.  CBC unremarkable except for a white count of 13.6.  CT of the abdomen and pelvis done in the emergency room showed findings consistent with perforated appendicitis with ill-defined developing periappendiceal abscess.  Nonobstructing punctate left renal stones were noted.  Patient's condition was discussed with Dr. Zayas from surgical services who recommended antibiotics therapy and admission to the  hospital.  Patient was given morphine, Zofran and IV Zosyn in the emergency room.  He also received a liter of normal saline bolus and was  admitted to the medical floor with telemetry for perforated appendicitis.  Otherwise reassuring labs and vitals noted.  A1c noted to be 9.3 with prior 12.  Patient taken for laparoscopic appendectomy per general surgeon Dr. Zayas on 3/13/2024 without any acute complications noted.  Patient was noted to have large abscess intraoperatively for which BRITTANIE drain was placed as well.  IV antibiotics continued.  Initiated antihypertensives.       Review of Systems:     All systems were reviewed and negative except as mentioned in subjective, assessment and plan.    Vital Signs:    Temp:  [97.1 °F (36.2 °C)-98.8 °F (37.1 °C)] 97.7 °F (36.5 °C)  Heart Rate:  [] 96  Resp:  [14-18] 18  BP: (118-167)/(66-98) 166/91    Intake and output:    I/O last 3 completed shifts:  In: 2300 [I.V.:1200; IV Piggyback:1100]  Out: 1100 [Urine:800; Drains:300]  I/O this shift:  In: 240 [P.O.:240]  Out: 75 [Drains:75]    Physical Examination:    General Appearance:  Alert and cooperative.  Chronically ill middle-age male.   Head:  Atraumatic and normocephalic.   Eyes: Conjunctivae and sclerae normal, no icterus. No pallor.   Throat: No oral lesions, no thrush, oral mucosa moist.   Neck: Supple, trachea midline, no thyromegaly.   Lungs:   Breath sounds heard bilaterally equally.  No wheezing or crackles. No Pleural rub or bronchial breathing.  On room air unlabored.   Heart:  Normal S1 and S2, no murmur, no gallop, no rub. No JVD.   Abdomen:   Normal bowel sounds, no masses, no organomegaly. Soft, nontender, nondistended, no rebound tenderness.  Slight discomfort generalized.  BRITTANIE drain noted to left abdomen with serosanguineous drainage.  Lap sites noted.   Extremities: Supple, right lower extremity edema, no cyanosis, no clubbing.  Left prosthetic leg noted below the knee amputation   Skin: No bleeding or  "rash.  Right lower extremity chronic venous stasis noted.   Neurologic: Alert and oriented x 3. No facial asymmetry. Moves all four limbs. No tremors.      Laboratory results:    Results from last 7 days   Lab Units 03/14/24  0707 03/13/24  0553 03/12/24  2040   SODIUM mmol/L 133* 134* 132*   POTASSIUM mmol/L 4.9 4.1 4.2   CHLORIDE mmol/L 101 100 95*   CO2 mmol/L 22.9 22.8 24.8   BUN mg/dL 13 13 13   CREATININE mg/dL 1.24 1.30* 1.27   CALCIUM mg/dL 8.7 8.4* 9.4   BILIRUBIN mg/dL  --   --  0.6   ALK PHOS U/L  --   --  97   ALT (SGPT) U/L  --   --  17   AST (SGOT) U/L  --   --  15   GLUCOSE mg/dL 228* 143* 286*     Results from last 7 days   Lab Units 03/14/24  0707 03/13/24  0553 03/12/24  2040   WBC 10*3/mm3 14.64* 12.39* 13.63*   HEMOGLOBIN g/dL 13.8 12.4* 13.6   HEMATOCRIT % 40.8 36.8* 40.2   PLATELETS 10*3/mm3 251 225 243         Results from last 7 days   Lab Units 03/12/24  2242 03/12/24  2040   HSTROP T ng/L 48* 45*     Results from last 7 days   Lab Units 03/13/24  0112 03/13/24  0109   BLOODCX  No growth at 24 hours No growth at 24 hours     No results for input(s): \"PHART\", \"CJW3KDX\", \"PO2ART\", \"JMP3RUU\", \"BASEEXCESS\" in the last 8760 hours.   I have reviewed the patient's laboratory results.    Radiology results:    CT Abdomen Pelvis With Contrast    Result Date: 3/13/2024  FINAL REPORT TECHNIQUE: null CLINICAL HISTORY: RUQ abd pain COMPARISON: null FINDINGS: CT abdomen and pelvis with contrast Comparison: None Findings: There is mild bibasilar atelectasis. The liver, pancreas, spleen, and adrenal glands are unremarkable. There are few tiny stones in the gallbladder. Gallbladder appears otherwise normal. There is a punctate nonobstructing left renal stone. Right kidney is unremarkable. The proximal portion of the appendix is dilated. The distal portion of the appendix is ruptured with an ill-defined developing abscess containing foci of gas measuring about 4.5 x 3.7 cm. There is reactive wall thickening of " the distal sigmoid colon which passes immediately adjacent to the collection. There is no bowel obstruction. Aorta is normal diameter. There are no enlarged lymph nodes. There is no acute fracture or suspicious lytic or sclerotic lesion. There is a chronic appearing mild superior endplate compression fracture of L5.     Impression: Impression: 1. Findings consistent with perforated appendicitis with ill-defined developing periappendiceal abscess. 2. Chronic findings as above. Authenticated and Electronically Signed by Musa Esquivel MD on 03/13/2024 12:20:57 AM   I have reviewed the patient's radiology reports.    Medication Review:     I have reviewed the patient's active and prn medications.     Problem List:      Acute appendicitis with perforation, localized peritonitis, abscess, and gangrene    Type 2 diabetes mellitus, with long-term current use of insulin    Essential hypertension      Assessment    Acute perforated appendicitis with abscess status post laparoscopic appendectomy, POA.  Diabetes mellitus type 2 with hyperglycemia, POA.  Essential hypertension.  Obesity with a BMI of 37.  Status post left BKA with prosthesis      Plan:    Acute perforated appendicitis.  -Status post laparoscopic appendectomy 3/13/2024  - Patient will be continued on IV antibiotics therapy with Zosyn.  - Morphine and Zofran for pain control and nausea.  - Dr. Zayas following.  Appreciate recommendations.  - Clear liquid diet.  -PT/OT/case management consulted.     Diabetes mellitus type 2.  - Continue Levemir with up titration due to hyperglycemia.  - Subcutaneous insulin protocol for coverage.  - A1c 9.3.  -Diabetic educator consulted.    Hypertension  -Does not currently take any home medications for this.  -Cozaar and Toprol-XL added as patient tachycardic as well.    I have reviewed the copied text and it is accurate as of 3/14/2024      DVT Prophylaxis: Enoxaparin  Code Status: Full  Diet: Clears  Discharge Plan: Several  days.    Janel Lorenzo, APRN  03/14/24  10:22 EDT    Dictated utilizing Dragon dictation.

## 2024-03-14 NOTE — PLAN OF CARE
Goal Outcome Evaluation:              Outcome Evaluation: VSS. No acute evenst during this shift. No concerns voiced at this time.

## 2024-03-14 NOTE — PLAN OF CARE
Goal Outcome Evaluation:        Problem: Pain Acute  Goal: Acceptable Pain Control and Functional Ability  Outcome: Ongoing, Progressing  Intervention: Prevent or Manage Pain  Recent Flowsheet Documentation  Taken 3/14/2024 1600 by Sarah Rodriguez LPN  Medication Review/Management: medications reviewed  Taken 3/14/2024 1200 by Sarah Rodriguez LPN  Medication Review/Management: medications reviewed  Taken 3/14/2024 0820 by Sarah Rodriguez LPN  Medication Review/Management: medications reviewed  Intervention: Develop Pain Management Plan  Recent Flowsheet Documentation  Taken 3/14/2024 1200 by Sarah Rodriguez LPN  Pain Management Interventions:   position adjusted   see MAR  Intervention: Optimize Psychosocial Wellbeing  Recent Flowsheet Documentation  Taken 3/14/2024 0820 by Sarah Rodriguez LPN  Diversional Activities:   television   smartphone     Problem: Adult Inpatient Plan of Care  Goal: Plan of Care Review  Outcome: Ongoing, Progressing  Goal: Patient-Specific Goal (Individualized)  Outcome: Ongoing, Progressing  Goal: Absence of Hospital-Acquired Illness or Injury  Outcome: Ongoing, Progressing  Intervention: Identify and Manage Fall Risk  Recent Flowsheet Documentation  Taken 3/14/2024 1600 by Sarah Rodriguez LPN  Safety Promotion/Fall Prevention:   safety round/check completed   room organization consistent   clutter free environment maintained   assistive device/personal items within reach  Taken 3/14/2024 1400 by Sarah Rodriguez LPN  Safety Promotion/Fall Prevention:   safety round/check completed   room organization consistent  Taken 3/14/2024 1200 by Sarah Rodriguez LPN  Safety Promotion/Fall Prevention:   safety round/check completed   room organization consistent   clutter free environment maintained   assistive device/personal items within reach  Taken 3/14/2024 1000 by Sarah Rodriguez LPN  Safety Promotion/Fall Prevention:   safety round/check completed   room organization  consistent   clutter free environment maintained   assistive device/personal items within reach  Taken 3/14/2024 0820 by Sarah Rodriguez LPN  Safety Promotion/Fall Prevention:   safety round/check completed   room organization consistent   clutter free environment maintained   assistive device/personal items within reach  Intervention: Prevent Skin Injury  Recent Flowsheet Documentation  Taken 3/14/2024 1200 by Sarah Rodriguez LPN  Body Position: sitting up in bed  Taken 3/14/2024 1000 by Sarah Rodriguez LPN  Body Position: sitting up in bed  Taken 3/14/2024 0820 by Sarah Rodriguez LPN  Body Position: sitting up in bed  Intervention: Prevent and Manage VTE (Venous Thromboembolism) Risk  Recent Flowsheet Documentation  Taken 3/14/2024 1600 by Sarah Rodriguez LPN  Activity Management: activity encouraged  Taken 3/14/2024 1400 by Sarah Rodriguez LPN  Activity Management: up in chair  Taken 3/14/2024 1200 by Sarah Rodriguez LPN  Activity Management: activity encouraged  Taken 3/14/2024 1000 by Sarah Rodriguez LPN  Activity Management: activity encouraged  Taken 3/14/2024 0820 by Sarah Rodriguez LPN  Activity Management: activity encouraged  VTE Prevention/Management: sequential compression devices off  Range of Motion: active ROM (range of motion) encouraged  Goal: Optimal Comfort and Wellbeing  Outcome: Ongoing, Progressing  Intervention: Monitor Pain and Promote Comfort  Recent Flowsheet Documentation  Taken 3/14/2024 1200 by Sarah Rodriguez LPN  Pain Management Interventions:   position adjusted   see MAR  Intervention: Provide Person-Centered Care  Recent Flowsheet Documentation  Taken 3/14/2024 0820 by Sarah Rodriguez LPN  Trust Relationship/Rapport:   care explained   choices provided   questions answered  Goal: Readiness for Transition of Care  Outcome: Ongoing, Progressing     Problem: Skin Injury Risk Increased  Goal: Skin Health and Integrity  Outcome: Ongoing,  Progressing  Intervention: Optimize Skin Protection  Recent Flowsheet Documentation  Taken 3/14/2024 1200 by Sarah Rodriguez LPN  Head of Bed (HOB) Positioning: HOB elevated  Taken 3/14/2024 1000 by Sarah Rodriguez LPN  Head of Bed (HOB) Positioning: HOB elevated  Taken 3/14/2024 0820 by Sarah Rodriguez LPN  Head of Bed (HOB) Positioning: HOB elevated     Problem: Adjustment to Illness (Sepsis/Septic Shock)  Goal: Optimal Coping  Outcome: Ongoing, Progressing  Intervention: Optimize Psychosocial Adjustment to Illness  Recent Flowsheet Documentation  Taken 3/14/2024 0820 by Sarah Rodriguez LPN  Family/Support System Care: self-care encouraged     Problem: Bleeding (Sepsis/Septic Shock)  Goal: Absence of Bleeding  Outcome: Ongoing, Progressing     Problem: Glycemic Control Impaired (Sepsis/Septic Shock)  Goal: Blood Glucose Level Within Desired Range  Outcome: Ongoing, Progressing     Problem: Infection Progression (Sepsis/Septic Shock)  Goal: Absence of Infection Signs and Symptoms  Outcome: Ongoing, Progressing  Intervention: Promote Recovery  Recent Flowsheet Documentation  Taken 3/14/2024 1600 by Sarah Rodriguez LPN  Activity Management: activity encouraged  Taken 3/14/2024 1400 by Sarah Rodriguez LPN  Activity Management: up in chair  Taken 3/14/2024 1200 by Sarah Rodriguez LPN  Activity Management: activity encouraged  Taken 3/14/2024 1000 by Sarah Rodriguez LPN  Activity Management: activity encouraged  Taken 3/14/2024 0820 by Sarah Rodriguez LPN  Activity Management: activity encouraged     Problem: Nutrition Impaired (Sepsis/Septic Shock)  Goal: Optimal Nutrition Intake  Outcome: Ongoing, Progressing     Problem: Fall Injury Risk  Goal: Absence of Fall and Fall-Related Injury  Outcome: Ongoing, Progressing  Intervention: Identify and Manage Contributors  Recent Flowsheet Documentation  Taken 3/14/2024 1600 by Sarah Rodriguez LPN  Medication Review/Management: medications  reviewed  Taken 3/14/2024 1200 by Sarah Rodriguez LPN  Medication Review/Management: medications reviewed  Taken 3/14/2024 0820 by Sarah Rodriguez LPN  Medication Review/Management: medications reviewed  Intervention: Promote Injury-Free Environment  Recent Flowsheet Documentation  Taken 3/14/2024 1600 by Sarah Rodriguez LPN  Safety Promotion/Fall Prevention:   safety round/check completed   room organization consistent   clutter free environment maintained   assistive device/personal items within reach  Taken 3/14/2024 1400 by Sarah Rodriguez LPN  Safety Promotion/Fall Prevention:   safety round/check completed   room organization consistent  Taken 3/14/2024 1200 by Sarah Rodriguez LPN  Safety Promotion/Fall Prevention:   safety round/check completed   room organization consistent   clutter free environment maintained   assistive device/personal items within reach  Taken 3/14/2024 1000 by Sarah Rodriguez LPN  Safety Promotion/Fall Prevention:   safety round/check completed   room organization consistent   clutter free environment maintained   assistive device/personal items within reach  Taken 3/14/2024 0820 by Sarah Rodriguez LPN  Safety Promotion/Fall Prevention:   safety round/check completed   room organization consistent   clutter free environment maintained   assistive device/personal items within reach

## 2024-03-14 NOTE — THERAPY EVALUATION
Patient Name: Deep Mendez  : 1965    MRN: [id number]                              Today's Date: 3/14/2024       Admit Date: 3/12/2024    Visit Dx:     ICD-10-CM ICD-9-CM   1. Perforated appendicitis  K35.32 540.0     Patient Active Problem List   Diagnosis    Acute appendicitis with perforation, localized peritonitis, abscess, and gangrene    Type 2 diabetes mellitus, with long-term current use of insulin    Essential hypertension     Past Medical History:   Diagnosis Date    Diabetes mellitus     Hypertension     Impaired mobility      Past Surgical History:   Procedure Laterality Date    APPENDECTOMY N/A 3/13/2024    Procedure: APPENDECTOMY LAPAROSCOPIC;  Surgeon: Yazmin Zayas MD;  Location: [facility];  Service: General;  Laterality: N/A;    BELOW KNEE LEG AMPUTATION      EYE SURGERY        General Information       Row Name 24 1116          Physical Therapy Time and Intention    Document Type evaluation  -     Mode of Treatment physical therapy  -       Row Name 24 1116          General Information    Patient Profile Reviewed yes  -     Prior Level of Function w/c or scooter;min assist:;gait  -     Existing Precautions/Restrictions fall;other (see comments)  LLE BKA  -       Row Name 24 1116          Living Environment    People in Home significant other  -       Row Name 24 1116          Home Main Entrance    Number of Stairs, Main Entrance two  -     Stair Railings, Main Entrance none  -       Row Name 24 1116          Stairs Within Home, Primary    Number of Stairs, Within Home, Primary twelve;other (see comments)  resides on primary level  -       Row Name 24 1116          Cognition    Orientation Status (Cognition) oriented x 4  -       Row Name 24 1116          Safety Issues, Functional Mobility    Safety Issues Affecting Function (Mobility) awareness of need for assistance;judgment;problem-solving;positioning of assistive device   -     Impairments Affecting Function (Mobility) endurance/activity tolerance;strength;pain;postural/trunk control;motor planning  -               User Key  (r) = Recorded By, (t) = Taken By, (c) = Cosigned By      Initials Name Provider Type     Rachelle Sharp PT Physical Therapist                   Mobility       Row Name 03/14/24 1118          Bed Mobility    Bed Mobility bed mobility (all) activities  -     All Activities, [place] (Bed Mobility) modified independence  -     Assistive Device (Bed Mobility) head of bed elevated;bed rails  -       Row Name 03/14/24 1118          Sit-Stand Transfer    Sit-Stand [place] (Transfers) minimum assist (75% patient effort);verbal cues;nonverbal cues (demo/gesture)  -     Assistive Device (Sit-Stand Transfers) walker, front-wheeled  -       Row Name 03/14/24 1118          Gait/Stairs (Locomotion)    [place] Level (Gait) contact guard  -     Assistive Device (Gait) walker, front-wheeled  -     Patient was able to Ambulate yes  -     Distance in Feet (Gait) 3  -     Deviations/Abnormal Patterns (Gait) huber decreased;gait speed decreased;bilateral deviations  -     Bilateral Gait Deviations forward flexed posture;heel strike decreased  -               User Key  (r) = Recorded By, (t) = Taken By, (c) = Cosigned By      Initials Name Provider Type     Rachelle Sharp PT Physical Therapist                   Obj/Interventions       Row Name 03/14/24 1118          Range of Motion Comprehensive    General Range of Motion bilateral lower extremity ROM WFL  -       Row Name 03/14/24 1118          Strength Comprehensive (MMT)    General Manual Muscle Testing (MMT) Assessment lower extremity strength deficits identified  -     Comment, General Manual Muscle Testing (MMT) Assessment BLE MMT grossly 4-/5 based on functional mobility assessment performed  -       Row Name 03/14/24 1118          Balance    Balance Assessment sitting static  balance;sitting dynamic balance;sit to stand dynamic balance;standing static balance;standing dynamic balance  -HW     Static Sitting Balance supervision  -HW     Dynamic Sitting Balance supervision  -HW     Position, Sitting Balance unsupported;sitting edge of bed  -HW     Sit to Stand Dynamic Balance minimal assist  -HW     Static Standing Balance contact guard  -HW     Dynamic Standing Balance contact guard  -HW     Position/Device Used, Standing Balance walker, front-wheeled;supported  -HW               User Key  (r) = Recorded By, (t) = Taken By, (c) = Cosigned By      Initials Name Provider Type    HW Rachelle Sharp, PT Physical Therapist                   Goals/Plan       Row Name 03/14/24 1123          Bed Mobility Goal 1 (PT)    Activity/Assistive Device (Bed Mobility Goal 1, PT) bed mobility activities, all  -HW     [place] Level/Cues Needed (Bed Mobility Goal 1, PT) independent  -HW     Time Frame (Bed Mobility Goal 1, PT) long term goal (LTG);2 weeks  -HW     Progress/Outcomes (Bed Mobility Goal 1, PT) new goal  -       Row Name 03/14/24 1123          Transfer Goal 1 (PT)    Activity/Assistive Device (Transfer Goal 1, PT) transfers, all  -HW     [place] Level/Cues Needed (Transfer Goal 1, PT) modified independence  -HW     Time Frame (Transfer Goal 1, PT) long term goal (LTG);2 weeks  -HW     Progress/Outcome (Transfer Goal 1, PT) new goal  -       Row Name 03/14/24 1123          Gait Training Goal 1 (PT)    Activity/Assistive Device (Gait Training Goal 1, PT) gait (walking locomotion);walker, rolling  -HW     [place] Level (Gait Training Goal 1, PT) standby assist  -HW     Distance (Gait Training Goal 1, PT) 50  -HW     Time Frame (Gait Training Goal 1, PT) long term goal (LTG);2 weeks  -HW     Progress/Outcome (Gait Training Goal 1, PT) new goal  -       Row Name 03/14/24 1123          Patient Education Goal (PT)    Activity (Patient Education Goal, PT) Pt will demonstrate the  ability to perform 3x10 BLE ther-ex with mod (I) to allow for improved BLE Strength and endurance  -     [place]/Cues/Accuracy (Memory Goal 2, PT) independent  -HW     Time Frame (Patient Education Goal, PT) short term goal (STG);1 week  -     Progress/Outcome (Patient Education Goal, PT) new goal  -       Row Name 03/14/24 1123          Therapy Assessment/Plan (PT)    Planned Therapy Interventions (PT) balance training;bed mobility training;gait training;home exercise program;motor coordination training;neuromuscular re-education;patient/family education;postural re-education;prosthetic fitting/training;transfer training;stretching;strengthening;stair training  -               User Key  (r) = Recorded By, (t) = Taken By, (c) = Cosigned By      Initials Name Provider Type     Rachelle Sharp PT Physical Therapist                   Clinical Impression       Row Name 03/14/24 1119          Pain    Pretreatment Pain Rating 0/10 - no pain  -     Posttreatment Pain Rating 0/10 - no pain  -       Row Name 03/14/24 1119          Plan of Care Review    Plan of Care Reviewed With patient  -     Progress no change  -     Outcome Evaluation Pt presents semi-fowlers in bed, agreeable to PT initial evaluation. Pt denies reports of pain at rest but states that his back has been hurting. Pt AOX4. Pt reports at baseline he lives in a multi-level apartment with his SO, resides on the primary floor. Pt reports he uses a RW for transfers and mobility outside of the home but uses a w/c for primary mobility inside the home. Pt uses a LLE prosthesis for mobility. Pt perfomred supine to sit on EOB with mod (I). Pt was able to don LLE prosthesis without assistance. Pt performed sit to stand transfer with min A and use of a RW from elevated bed. Pt ambulated x3' with CGA and use of a RW. Following evaluation, pt left seated in bedside chair with chair alarm active, call light and all needs within reach. Recommend  skilled PT intervention during IP admission to reduce risk of falls, prevent functional decline and allow pt to return to OF. Once medically stable, recommend pt to d/c home with support from family and HHPT to address remaining deficits.  -       Row Name 03/14/24 1119          Therapy Assessment/Plan (PT)    Patient/Family Therapy Goals Statement (PT) Pt reports he would like to return home at time of d/c  -     Rehab Potential (PT) good, to achieve stated therapy goals  -     Criteria for Skilled Interventions Met (PT) yes  -HW     Therapy Frequency (PT) 5 times/wk  -     Predicted Duration of Therapy Intervention (PT) 10 days  -       Row Name 03/14/24 1119          Vital Signs    Pre SpO2 (%) 96  -HW     O2 Delivery Pre Treatment room air  -HW     Post SpO2 (%) 95  -HW     O2 Delivery Post Treatment room air  -HW     Pre Patient Position Supine  -HW     Intra Patient Position Standing  -HW     Post Patient Position Sitting  -       Row Name 03/14/24 1119          Positioning and Restraints    Pre-Treatment Position in bed  -HW     Post Treatment Position chair  -HW     In Chair sitting;call light within reach;encouraged to call for assist;exit alarm on  -               User Key  (r) = Recorded By, (t) = Taken By, (c) = Cosigned By      Initials Name Provider Type     Rachelle Sharp, PT Physical Therapist                   Outcome Measures       Row Name 03/14/24 1124          How much help from another person do you currently need...    Turning from your back to your side while in flat bed without using bedrails? 4  -HW     Moving from lying on back to sitting on the side of a flat bed without bedrails? 4  -HW     Moving to and from a bed to a chair (including a wheelchair)? 3  -HW     Standing up from a chair using your arms (e.g., wheelchair, bedside chair)? 3  -HW     Climbing 3-5 steps with a railing? 2  -HW     To walk in hospital room? 3  -HW     AM-PAC 6 Clicks Score (PT) 19  -HW      Highest Level of Mobility Goal 6 --> Walk 10 steps or more  -       Row Name 03/14/24 1124          Functional Assessment    Outcome Measure Options AM-PAC 6 Clicks Basic Mobility (PT)  -               User Key  (r) = Recorded By, (t) = Taken By, (c) = Cosigned By      Initials Name Provider Type     Rachelle Sharp PT Physical Therapist                                 Physical Therapy Education       Title: PT OT SLP Therapies (In Progress)       Topic: Physical Therapy (In Progress)       Point: Mobility training (Done)       Learning Progress Summary             Patient Acceptance, E,TB, VU by  at 3/14/2024 1125    Comment: educated pt on importance of OOB mobility during IP admission                         Point: Home exercise program (Not Started)       Learner Progress:  Not documented in this visit.              Point: Body mechanics (Not Started)       Learner Progress:  Not documented in this visit.              Point: Precautions (Not Started)       Learner Progress:  Not documented in this visit.                              User Key       Initials Effective Dates Name Provider Type Discipline     11/29/23 -  Rachelle Sharp PT Physical Therapist PT                  PT Recommendation and Plan  Planned Therapy Interventions (PT): balance training, bed mobility training, gait training, home exercise program, motor coordination training, neuromuscular re-education, patient/family education, postural re-education, prosthetic fitting/training, transfer training, stretching, strengthening, stair training  Plan of Care Reviewed With: patient  Progress: no change  Outcome Evaluation: Pt presents semi-fowlers in bed, agreeable to PT initial evaluation. Pt denies reports of pain at rest but states that his back has been hurting. Pt AOX4. Pt reports at baseline he lives in a multi-level apartment with his SO, resides on the primary floor. Pt reports he uses a RW for transfers and mobility outside of the home  but uses a w/c for primary mobility inside the home. Pt uses a LLE prosthesis for mobility. Pt perfomred supine to sit on EOB with mod (I). Pt was able to don LLE prosthesis without assistance. Pt performed sit to stand transfer with min A and use of a RW from elevated bed. Pt ambulated x3' with CGA and use of a RW. Following evaluation, pt left seated in bedside chair with chair alarm active, call light and all needs within reach. Recommend skilled PT intervention during IP admission to reduce risk of falls, prevent functional decline and allow pt to return to PLOF. Once medically stable, recommend pt to d/c home with support from family and HHPT to address remaining deficits.     Time Calculation:   PT Evaluation Complexity  History, PT Evaluation Complexity: 1-2 personal factors and/or comorbidities  Examination of Body Systems (PT Eval Complexity): total of 3 or more elements  Clinical Presentation (PT Evaluation Complexity): evolving  Clinical Decision Making (PT Evaluation Complexity): moderate complexity  Overall Complexity (PT Evaluation Complexity): moderate complexity     PT Charges       Row Name 03/14/24 1125             Time Calculation    Start Time 0920  -HW      PT Received On 03/14/24  -      PT Goal Re-Cert Due Date 03/24/24  -         Untimed Charges    PT Eval/Re-eval Minutes 41  -HW         Total Minutes    Untimed Charges Total Minutes 41  -HW       Total Minutes 41  -HW                User Key  (r) = Recorded By, (t) = Taken By, (c) = Cosigned By      Initials Name Provider Type     Rachelle Sharp PT Physical Therapist                  Therapy Charges for Today       Code Description Service Date Service Provider Modifiers Qty    [id number]  PT EVAL MOD COMPLEXITY 3 3/14/2024 Rachelle Sharp PT GP 1            PT G-Codes  Outcome Measure Options: AM-PAC 6 Clicks Basic Mobility (PT)  AM-PAC 6 Clicks Score (PT): 19  PT Discharge Summary  Anticipated Discharge Disposition (PT): home with  assist, home with home health    Rachelle Sharp, PT  3/14/2024

## 2024-03-14 NOTE — THERAPY EVALUATION
Patient Name: Deep Mendez  : 1965    MRN: [id number]                              Today's Date: 3/14/2024       Admit Date: 3/12/2024    Visit Dx:     ICD-10-CM ICD-9-CM   1. Perforated appendicitis  K35.32 540.0     Patient Active Problem List   Diagnosis    Acute appendicitis with perforation, localized peritonitis, abscess, and gangrene    Type 2 diabetes mellitus, with long-term current use of insulin    Essential hypertension    Rupture of appendix     Past Medical History:   Diagnosis Date    Diabetes mellitus     Hypertension     Impaired mobility      Past Surgical History:   Procedure Laterality Date    APPENDECTOMY N/A 3/13/2024    Procedure: APPENDECTOMY LAPAROSCOPIC;  Surgeon: Yazmin Zayas MD;  Location: [facility];  Service: General;  Laterality: N/A;    BELOW KNEE LEG AMPUTATION      EYE SURGERY        General Information       Row Name 24 1226          OT Time and Intention    Document Type evaluation  -     Mode of Treatment occupational therapy  -       Row Name 24 1226          General Information    Patient Profile Reviewed yes  -     Prior Level of Function independent:;bathing;dressing;min assist:;w/c or scooter  -     Existing Precautions/Restrictions fall;other (see comments)  LLE BKA  -     Barriers to Rehab medically complex;previous functional deficit  -       Row Name 24 1226          Occupational Profile    Reason for Services/Referral (Occupational Profile) ADL decline  -       Row Name 24 1226          Living Environment    People in Home significant other  -       Row Name 24 1226          Home Main Entrance    Number of Stairs, Main Entrance two  -     Stair Railings, Main Entrance none  -       Row Name 24 1226          Stairs Within Home, Primary    Stairs, Within Home, Primary has 2 story apartment, but stays on main level  -     Number of Stairs, Within Home, Primary twelve  -       Row Name 24 1226           Cognition    Orientation Status (Cognition) oriented x 4  -[facility] Name 03/14/24 1226          Safety Issues, Functional Mobility    Safety Issues Affecting Function (Mobility) awareness of need for assistance;judgment;problem-solving;positioning of assistive device  -     Impairments Affecting Function (Mobility) endurance/activity tolerance;strength;pain;postural/trunk control;motor planning  -               User Key  (r) = Recorded By, (t) = Taken By, (c) = Cosigned By      Initials Name Provider Type     Yevtte Cabral Occupational Therapist                     Mobility/ADL's       Row Name 03/14/24 1231          Bed Mobility    Bed Mobility bed mobility (all) activities  -     All Activities, [place] (Bed Mobility) modified independence  -     Assistive Device (Bed Mobility) head of bed elevated;bed rails  -[facility] Name 03/14/24 1231          Sit-Stand Transfer    Sit-Stand [place] (Transfers) minimum assist (75% patient effort);verbal cues;nonverbal cues (demo/gesture)  -     Assistive Device (Sit-Stand Transfers) walker, front-wheeled  -[facility] Name 03/14/24 1231          Functional Mobility    Functional Mobility- Ind. Level contact guard assist  -     Functional Mobility- Device walker, front-wheeled  -     Functional Mobility-Distance (Feet) 3  -     Patient was able to Ambulate yes  -[facility] Name 03/14/24 1231          Activities of Daily Living    BADL Assessment/Intervention bathing;upper body dressing;lower body dressing;grooming;feeding;toileting  -[facility] Name 03/14/24 1231          Bathing Assessment/Intervention    [place] Level (Bathing) minimum assist (75% patient effort)  -[facility] Name 03/14/24 1231          Upper Body Dressing Assessment/Training    [place] Level (Upper Body Dressing) set up  -[facility] Name 03/14/24 1231          Lower Body Dressing Assessment/Training    [place] Level (Lower Body Dressing)  contact guard assist  -     Comment, (Lower Body Dressing) able to don his prosthesis with set up  -AH       Row Name 03/14/24 1231          Grooming Assessment/Training    [place] Level (Grooming) set up  -AH       Row Name 03/14/24 1231          Self-Feeding Assessment/Training    [place] Level (Feeding) independent  -AH       Row Name 03/14/24 1231          Toileting Assessment/Training    [place] Level (Toileting) minimum assist (75% patient effort)  -               User Key  (r) = Recorded By, (t) = Taken By, (c) = Cosigned By      Initials Name Provider Type    Yvette Kennedy Occupational Therapist                   Obj/Interventions       [facility] Name 03/14/24 1234          Sensory Assessment (Somatosensory)    Sensory Assessment (Somatosensory) sensation intact  -AH       Row Name 03/14/24 1234          Vision Assessment/Intervention    Visual Impairment/Limitations WFL  -AH       Row Name 03/14/24 1234          Range of Motion Comprehensive    General Range of Motion bilateral upper extremity ROM WFL  -AH       Row Name 03/14/24 1234          Strength Comprehensive (MMT)    Comment, General Manual Muscle Testing (MMT) Assessment BUE [facility]               User Key  (r) = Recorded By, (t) = Taken By, (c) = Cosigned By      Initials Name Provider Type    Yvette Kennedy Occupational Therapist                   Goals/Plan       Row Name 03/14/24 1242          Transfer Goal 1 (OT)    Activity/Assistive Device (Transfer Goal 1, OT) sit-to-stand/stand-to-sit;walker, rolling  -     [place] Level/Cues Needed (Transfer Goal 1, OT) supervision required  -     Time Frame (Transfer Goal 1, OT) by discharge  -     Progress/Outcome (Transfer Goal 1, OT) goal ongoing  -AH       Row Name 03/14/24 1242          Bathing Goal 1 (OT)    Activity/Device (Bathing Goal 1, OT) bathing skills, all  -     [place] Level/Cues Needed (Bathing Goal 1, OT) set-up required  -     Time Frame (Bathing  Goal 1, OT) by discharge  -     Progress/Outcomes (Bathing Goal 1, OT) goal ongoing  -[facility] Name 03/14/24 1242          Dressing Goal 1 (OT)    Activity/Device (Dressing Goal 1, OT) lower body dressing  -     [place]/Cues Needed (Dressing Goal 1, OT) supervision required  -     Time Frame (Dressing Goal 1, OT) long term goal (LTG);5 days  -     Progress/Outcome (Dressing Goal 1, OT) goal ongoing  -[facility] Name 03/14/24 1242          Toileting Goal 1 (OT)    Activity/Device (Toileting Goal 1, OT) adjust/manage clothing;perform perineal hygiene  -     [place] Level/Cues Needed (Toileting Goal 1, OT) supervision required  -     Time Frame (Toileting Goal 1, OT) long term goal (LTG);5 days  -     Progress/Outcome (Toileting Goal 1, OT) goal ongoing  -[facility] Name 03/14/24 1242          Strength Goal 1 (OT)    Strength Goal 1 (OT) Pt will perform UB strengthening ex using theraband for resistance.  -     Time Frame (Strength Goal 1, OT) by discharge  -     Progress/Outcome (Strength Goal 1, OT) goal ongoing  -[facility] Name 03/14/24 1242          Therapy Assessment/Plan (OT)    Planned Therapy Interventions (OT) activity tolerance training;BADL retraining;patient/caregiver education/training;transfer/mobility retraining;strengthening exercise  -               User Key  (r) = Recorded By, (t) = Taken By, (c) = Cosigned By      Initials Name Provider Type     Yvette Cabral Occupational Therapist                   Clinical Impression       [facility] Name 03/14/24 1235          Pain Assessment    Pretreatment Pain Rating 0/10 - no pain  -     Posttreatment Pain Rating 0/10 - no pain  -     Pain Intervention(s) Repositioned;Ambulation/increased activity  -[facility] Name 03/14/24 1235          Plan of Care Review    Plan of Care Reviewed With patient  -     Progress no change  -     Outcome Evaluation Pt seen for OT evaluation today.  Pt presents with weakness and  decreased independence with self care and functional mobility tasks.  Pt sat eob independently, stood with min assist and RW, walked 3' with cga using RW.  Pt able to don his prosthesis independently with set up.  Pt is expected to benefit from skilled OT to improve his strength and independence with ADL tasks. Pt wants to d/c home with home health.  -       Row Name 03/14/24 1235          Therapy Assessment/Plan (OT)    Patient/Family Therapy Goal Statement (OT) d/c home  -     Rehab Potential (OT) good, to achieve stated therapy goals  -     Criteria for Skilled Therapeutic Interventions Met (OT) yes;skilled treatment is necessary  -     Therapy Frequency (OT) 3 times/wk  -[facility] Name 03/14/24 1235          Therapy Plan Review/Discharge Plan (OT)    Anticipated Discharge Disposition (OT) home with home health  -[facility] Name 03/14/24 1235          Vital Signs    Pre SpO2 (%) 96  -     Post SpO2 (%) 95  -[facility] Name 03/14/24 1235          Positioning and Restraints    Pre-Treatment Position in bed  -     Post Treatment Position chair  -     In Chair sitting;call light within reach;encouraged to call for assist;exit alarm on  -               User Key  (r) = Recorded By, (t) = Taken By, (c) = Cosigned By      Initials Name Provider Type    Yvette Kennedy Occupational Therapist                   Outcome Measures       Row Name 03/14/24 1243          How much help from another is currently needed...    Putting on and taking off regular lower body clothing? 3  -AH     Bathing (including washing, rinsing, and drying) 3  -AH     Toileting (which includes using toilet bed pan or urinal) 3  -AH     Putting on and taking off regular upper body clothing 4  -AH     Taking care of personal grooming (such as brushing teeth) 4  -AH     Eating meals 4  -AH     AM-PAC 6 Clicks Score (OT) 21  -       Row Name 03/14/24 1124          How much help from another person do you currently need...     Turning from your back to your side while in flat bed without using bedrails? 4  -HW     Moving from lying on back to sitting on the side of a flat bed without bedrails? 4  -HW     Moving to and from a bed to a chair (including a wheelchair)? 3  -HW     Standing up from a chair using your arms (e.g., wheelchair, bedside chair)? 3  -HW     Climbing 3-5 steps with a railing? 2  -HW     To walk in hospital room? 3  -HW     AM-PAC 6 Clicks Score (PT) 19  -HW     Highest Level of Mobility Goal 6 --> Walk 10 steps or more  -       Row Name 03/14/24 1243 03/14/24 1124       Functional Assessment    Outcome Measure Options AM-PAC 6 Clicks Daily Activity (OT)  - AM-PAC 6 Clicks Basic Mobility (PT)  -              User Key  (r) = Recorded By, (t) = Taken By, (c) = Cosigned By      Initials Name Provider Type     Yvette Cabral Occupational Therapist     Rachelle Sharp, JENNI Physical Therapist                    Occupational Therapy Education       Title: PT OT SLP Therapies (In Progress)       Topic: Occupational Therapy (In Progress)       Point: ADL training (Done)       Description:   Instruct learner(s) on proper safety adaptation and remediation techniques during self care or transfers.   Instruct in proper use of assistive devices.                  Learning Progress Summary             Patient Acceptance, E,TB, VU by  at 3/14/2024 1244    Comment: Role of OT/POC                         Point: Home exercise program (Not Started)       Description:   Instruct learner(s) on appropriate technique for monitoring, assisting and/or progressing therapeutic exercises/activities.                  Learner Progress:  Not documented in this visit.              Point: Precautions (Not Started)       Description:   Instruct learner(s) on prescribed precautions during self-care and functional transfers.                  Learner Progress:  Not documented in this visit.              Point: Body mechanics (Not Started)        Description:   Instruct learner(s) on proper positioning and spine alignment during self-care, functional mobility activities and/or exercises.                  Learner Progress:  Not documented in this visit.                              User Key       Initials Effective Dates Name Provider Type Discipline     06/16/21 -  Yvette Cabral Occupational Therapist OT                  OT Recommendation and Plan  Planned Therapy Interventions (OT): activity tolerance training, BADL retraining, patient/caregiver education/training, transfer/mobility retraining, strengthening exercise  Therapy Frequency (OT): 3 times/wk  Plan of Care Review  Plan of Care Reviewed With: patient  Progress: no change  Outcome Evaluation: Pt seen for OT evaluation today.  Pt presents with weakness and decreased independence with self care and functional mobility tasks.  Pt sat eob independently, stood with min assist and RW, walked 3' with cga using RW.  Pt able to don his prosthesis independently with set up.  Pt is expected to benefit from skilled OT to improve his strength and independence with ADL tasks. Pt wants to d/c home with home health.     Time Calculation:   Evaluation Complexity (OT)  Review Occupational Profile/Medical/Therapy History Complexity: expanded/moderate complexity  Assessment, Occupational Performance/Identification of Deficit Complexity: 3-5 performance deficits  Clinical Decision Making Complexity (OT): detailed assessment/moderate complexity  Overall Complexity of Evaluation (OT): moderate complexity     Time Calculation- OT       Row Name 03/14/24 1244             Time Calculation- OT    OT Start Time 0922  -      OT Received On 03/14/24  -      OT Goal Re-Cert Due Date 03/24/24  -         Untimed Charges    OT Eval/Re-eval Minutes 53  -AH         Total Minutes    Untimed Charges Total Minutes 53  -AH       Total Minutes 53  -AH                User Key  (r) = Recorded By, (t) = Taken By, (c) = Cosigned By       Initials Name Provider Type    Yvette Kennedy Occupational Therapist                  Therapy Charges for Today       Code Description Service Date Service Provider Modifiers Qty    [id number] HC OT EVAL MOD COMPLEXITY 4 3/14/2024 Yvette Cabral GO 1                 Yvette Cabral  3/14/2024

## 2024-03-14 NOTE — PLAN OF CARE
Goal Outcome Evaluation:  Plan of Care Reviewed With: patient        Progress: no change  Outcome Evaluation: Pt presents semi-fowlers in bed, agreeable to PT initial evaluation. Pt denies reports of pain at rest but states that his back has been hurting. Pt AOX4. Pt reports at baseline he lives in a multi-level apartment with his SO, resides on the primary floor. Pt reports he uses a RW for transfers and mobility outside of the home but uses a w/c for primary mobility inside the home. Pt uses a LLE prosthesis for mobility. Pt perfomred supine to sit on EOB with mod (I). Pt was able to don LLE prosthesis without assistance. Pt performed sit to stand transfer with min A and use of a RW from elevated bed. Pt ambulated x3' with CGA and use of a RW. Following evaluation, pt left seated in bedside chair with chair alarm active, call light and all needs within reach. Recommend skilled PT intervention during IP admission to reduce risk of falls, prevent functional decline and allow pt to return to PLOF. Once medically stable, recommend pt to d/c home with support from family and HHPT to address remaining deficits.      Anticipated Discharge Disposition (PT): home with assist, home with home health

## 2024-03-14 NOTE — PLAN OF CARE
Goal Outcome Evaluation:  Plan of Care Reviewed With: patient        Progress: no change  Outcome Evaluation: Pt seen for OT evaluation today.  Pt presents with weakness and decreased independence with self care and functional mobility tasks.  Pt sat eob independently, stood with min assist and RW, walked 3' with cga using RW.  Pt able to don his prosthesis independently with set up.  Pt is expected to benefit from skilled OT to improve his strength and independence with ADL tasks. Pt wants to d/c home with home health.      Anticipated Discharge Disposition (OT): home with home health

## 2024-03-14 NOTE — PAYOR COMM NOTE
"TO:BC  FROM:GALINA SNEED, RN PHONE [phone]  IN CLINICALS REF# [id number]    Joya Mendez (58 y.o. Male)       Date of Birth   1965    Social Security Number       Address   [address] [place] [address]    Home Phone   [phone]    MRN   [id number]       Rastafari   None    Marital Status   Single                            Admission Date   3/12/24    Admission Type   Emergency    Admitting Provider   Ryan Ozuna MD    Attending Provider   Vilma Dia MD    Department, Room/Bed   [facility] TELEMETRY 4, 428/1       Discharge Date       Discharge Disposition       Discharge Destination                                 Attending Provider: Vilma Dia MD    Allergies: No Known Allergies    Isolation: None   Infection: None   Code Status: CPR    Ht: 182.9 cm (72\")   Wt: 119 kg (261 lb 7.5 oz)    Admission Cmt: None   Principal Problem: Acute appendicitis with perforation, localized peritonitis, abscess, and gangrene [K35.33]                   Active Insurance as of 3/12/2024       Primary Coverage       Payor Plan Insurance Group Employer/Plan Group    ANTHEM MEDICARE REPLACEMENT ANTHEM MEDICARE ADVANTAGE [id number]       Payor Plan Address Payor Plan Phone Number Payor Plan Fax Number Effective Dates    [address] [phone]  2020 - None Entered    [facility] [address]         Subscriber Name Subscriber Birth Date Member ID       JOYA MENDEZ 1965 [id number]                     Emergency Contacts        (Rel.) Home Phone Work Phone Mobile Phone    TODD PEDRO (Friend) -- -- [phone]                 History & Physical        Ryan Ozuna MD at 24 0116            [facility] HOSPITALIST   HISTORY AND PHYSICAL      Name:  Joya BEY Andrea   Age:  58 y.o.  Sex:  male  :  1965  MRN:  [id number]   Visit Number:  [id number]  Admission Date:  3/12/2024  Date Of Service:  " 03/13/24  Primary Care Physician:  Sami Fisher MD    Chief Complaint:     Abdominal pain.    History Of Presenting Illness:      Deep Mendez is a 58-year-old male with history of hypertension and diabetes mellitus type 2 on insulin and Trulicity was brought to the emergency room by his friend with right lower quadrant abdominal pain that started earlier in the day.  Patient states that he was in his usual state of health until today when he was in the bathroom had a bowel movement, subsequently developed right upper and lower quadrant pain that was sharp and radiating up into the chest.  He had some nausea but no vomiting.  No prior history of abdominal surgeries.  He is diabetic and is status post low left below knee amputation.  No fevers.    In the emergency room, he was afebrile and hemodynamically stable saturating at 97% on room air but his blood pressure was elevated at 164/88.  Initial troponin 45 with a repeat at 48.  CMP was unremarkable except for a sodium of 132 and a glucose of 286.  Lipase normal at 16.  CBC unremarkable except for a white count of 13.6.  CT of the abdomen and pelvis done in the emergency room showed findings consistent with perforated appendicitis with ill-defined developing periappendiceal abscess.  Nonobstructing punctate left renal stones were noted.  Patient's condition was discussed with Dr. Zayas from surgical services who recommended antibiotics therapy and admission to the hospital.  Patient was given morphine, Zofran and IV Zosyn in the emergency room.  He also received a liter of normal saline bolus and was subsequently admitted to the medical floor with telemetry for perforated appendicitis.    Review Of Systems:    All systems were reviewed and negative except as mentioned in history of presenting illness, assessment and plan.    Past Medical History: Patient  has a past medical history of Diabetes mellitus and Hypertension.    Past Surgical History: Patient  has a  "past surgical history that includes Below knee leg amputation and Eye surgery.    Social History: Patient  reports that he has quit smoking. His smoking use included cigarettes. He does not have any smokeless tobacco history on file. He reports that he does not drink alcohol and does not use drugs.    Family History:  Patient denies any significant past medical history.    Allergies:      Patient has no known allergies.    Home Medications:    Prior to Admission Medications       None     ED Medications:    Medications   sodium chloride 0.9 % flush 10 mL (has no administration in time range)   sodium chloride 0.9 % bolus 1,000 mL (has no administration in time range)   piperacillin-tazobactam (ZOSYN) 4.5 g in iso-osmotic dextrose 100 mL IVPB (premix) (has no administration in time range)   morphine injection 4 mg (4 mg Intravenous Given 3/12/24 2240)   ondansetron (ZOFRAN) injection 4 mg (4 mg Intravenous Given 3/12/24 2240)   iopamidol (ISOVUE-300) 61 % injection 100 mL (100 mL Intravenous Given 3/12/24 2330)     Vital Signs:  Temp:  [97.7 °F (36.5 °C)] 97.7 °F (36.5 °C)  Heart Rate:  [92-99] 92  Resp:  [18] 18  BP: (133-164)/(77-88) 133/82        03/12/24 2030   Weight: 124 kg (273 lb)     Body mass index is 37.03 kg/m².    Physical Exam:     Most recent vital Signs: /82   Pulse 92   Temp 97.7 °F (36.5 °C) (Oral)   Resp 18   Ht 182.9 cm (72\")   Wt 124 kg (273 lb)   SpO2 95%   BMI 37.03 kg/m²     Physical Exam  Constitutional:       General: He is not in acute distress.     Appearance: Normal appearance. He is obese. He is not ill-appearing.   HENT:      Head: Normocephalic and atraumatic.      Right Ear: External ear normal.      Left Ear: External ear normal.      Nose: Nose normal.      Mouth/Throat:      Mouth: Mucous membranes are moist.   Eyes:      Extraocular Movements: Extraocular movements intact.      Conjunctiva/sclera: Conjunctivae normal.   Cardiovascular:      Rate and Rhythm: Normal " rate and regular rhythm.      Pulses: Normal pulses.      Heart sounds: Normal heart sounds. No murmur heard.  Pulmonary:      Effort: Pulmonary effort is normal.      Breath sounds: Normal breath sounds. No wheezing or rales.   Abdominal:      General: Bowel sounds are normal.      Palpations: Abdomen is soft.      Tenderness: There is abdominal tenderness. There is no guarding or rebound.      Comments: Obese abdomen with right lower quadrant tenderness.   Musculoskeletal:         General: Normal range of motion.      Cervical back: Neck supple.      Right lower leg: Edema present.      Comments: Left below-knee amputation with prosthetic leg noted.  2+ pitting edema noted on the right leg.   Skin:     General: Skin is warm.      Findings: No erythema or rash.   Neurological:      General: No focal deficit present.      Mental Status: He is oriented to person, place, and time. Mental status is at baseline.   Psychiatric:         Mood and Affect: Mood normal.         Behavior: Behavior normal.       Laboratory data:    I have reviewed the labs done in the emergency room.    Results from last 7 days   Lab Units 03/12/24  2040   SODIUM mmol/L 132*   POTASSIUM mmol/L 4.2   CHLORIDE mmol/L 95*   CO2 mmol/L 24.8   BUN mg/dL 13   CREATININE mg/dL 1.27   CALCIUM mg/dL 9.4   BILIRUBIN mg/dL 0.6   ALK PHOS U/L 97   ALT (SGPT) U/L 17   AST (SGOT) U/L 15   GLUCOSE mg/dL 286*     Results from last 7 days   Lab Units 03/12/24  2040   WBC 10*3/mm3 13.63*   HEMOGLOBIN g/dL 13.6   HEMATOCRIT % 40.2   PLATELETS 10*3/mm3 243         Results from last 7 days   Lab Units 03/12/24  2242 03/12/24 2040   HSTROP T ng/L 48* 45*             Results from last 7 days   Lab Units 03/12/24  2040   LIPASE U/L 16     EKG:      EKG done in the emergency room was reviewed by me.  It shows sinus rhythm at 95 bpm.  Normal axis.  No significant ST-T changes were noted.    Radiology:    CT Abdomen Pelvis With Contrast    Result Date: 3/13/2024  FINAL  REPORT TECHNIQUE: null CLINICAL HISTORY: RUQ abd pain COMPARISON: null FINDINGS: CT abdomen and pelvis with contrast Comparison: None Findings: There is mild bibasilar atelectasis. The liver, pancreas, spleen, and adrenal glands are unremarkable. There are few tiny stones in the gallbladder. Gallbladder appears otherwise normal. There is a punctate nonobstructing left renal stone. Right kidney is unremarkable. The proximal portion of the appendix is dilated. The distal portion of the appendix is ruptured with an ill-defined developing abscess containing foci of gas measuring about 4.5 x 3.7 cm. There is reactive wall thickening of the distal sigmoid colon which passes immediately adjacent to the collection. There is no bowel obstruction. Aorta is normal diameter. There are no enlarged lymph nodes. There is no acute fracture or suspicious lytic or sclerotic lesion. There is a chronic appearing mild superior endplate compression fracture of L5.     Impression: 1. Findings consistent with perforated appendicitis with ill-defined developing periappendiceal abscess. 2. Chronic findings as above. Authenticated and Electronically Signed by Musa Esquivel MD on 03/13/2024 12:20:57 AM     Assessment:    Acute perforated appendicitis with abscess, POA.  Diabetes mellitus type 2 with hyperglycemia, POA.  Essential hypertension.  Obesity with a BMI of 37.  Status post left BKA.    Plan:    Acute perforated appendicitis.  - Patient will be continued on IV antibiotics therapy with Zosyn.  - Morphine and Zofran for pain control and nausea.  - We will consult Dr. Zayas from surgical services.  - We will keep him n.p.o. with ice chips until evaluated by surgery.  - Continue IV fluids with normal saline at 100 mL/h.    Diabetes mellitus type 2.  - We will place him on low-dose Levemir 8 units daily.  - Subcutaneous insulin protocol for coverage.  - Will obtain hemoglobin A1c levels.      Risk Assessment: Moderate  DVT Prophylaxis:  Enoxaparin  Code Status: Full  Diet: N.p.o.      Ryan Ozuna MD  03/13/24  01:16 EDT    Dictated utilizing Dragon dictation.    Electronically signed by Ryan Ozuna MD at 03/13/24 0157          Emergency Department Notes        Dimple Barlow RN at 03/13/24 1045          Called pharmacy at this time requesting zosyn    Electronically signed by Dimple Barlow RN at 03/13/24 1051       Dimple Barlow RN at 03/13/24 0830          Asked pharmacy at this time for zosyn    Electronically signed by Dimple Barlow RN at 03/13/24 1051       Ronaldo Gates PA-C at 03/13/24 0051       Attestation signed by Lucius Iraheta MD at 03/13/24 0144        SUPERVISE: For this patient encounter, I reviewed the APC's documentation, treatment plan, and medical decision making.  Lucius Iraheta MD 3/13/2024 01:44 EDT                         Subjective  History of Present Illness:    Chief Complaint:   Chief Complaint   Patient presents with    Abdominal Pain      History of Present Illness: Deep Mendez is a 58 y.o. male who presents to the emergency department complaining of right upper quadrant abdominal pain.  Patient states he had a normal bowel movement this evening and then right afterwards had acute onset of right upper abdominal pain that radiated up into his chest.  Nausea but no vomiting.  History of diabetes with a left BKA hypertension.  No cardiac history.  No past abdominal surgeries.  Onset: This evening  Duration: Ongoing  Exacerbating / Alleviating factors: Pain improved by holding pressure to the right upper quadrant per patient  Associated symptoms: Nausea      Nurses Notes reviewed and agree, including vitals, allergies, social history and prior medical history.     Review of Systems   Constitutional: Negative.    HENT: Negative.     Eyes: Negative.    Respiratory: Negative.     Cardiovascular: Negative.    Gastrointestinal:  Positive for abdominal pain and nausea.  "  Genitourinary: Negative.    Musculoskeletal: Negative.    Skin: Negative.    Allergic/Immunologic: Negative.    Neurological: Negative.    Psychiatric/Behavioral: Negative.     All other systems reviewed and are negative.      Past Medical History:   Diagnosis Date    Diabetes mellitus     Hypertension        Allergies:    Patient has no known allergies.      Past Surgical History:   Procedure Laterality Date    BELOW KNEE LEG AMPUTATION      EYE SURGERY           Social History     Socioeconomic History    Marital status: Single   Tobacco Use    Smoking status: Former     Types: Cigarettes   Substance and Sexual Activity    Alcohol use: Never    Drug use: Never    Sexual activity: Defer         History reviewed. No pertinent family history.    Objective  Physical Exam:  /82   Pulse 92   Temp 97.7 °F (36.5 °C) (Oral)   Resp 18   Ht 182.9 cm (72\")   Wt 124 kg (273 lb)   SpO2 95%   BMI 37.03 kg/m²      Physical Exam  Vitals and nursing note reviewed.   Constitutional:       General: He is not in acute distress.     Appearance: He is well-developed and normal weight. He is not ill-appearing, toxic-appearing or diaphoretic.   HENT:      Head: Normocephalic and atraumatic.   Eyes:      Extraocular Movements: Extraocular movements intact.   Cardiovascular:      Rate and Rhythm: Normal rate and regular rhythm.   Pulmonary:      Effort: Pulmonary effort is normal.   Abdominal:      General: Abdomen is flat. Bowel sounds are normal.      Palpations: Abdomen is soft.      Tenderness:  in the right upper quadrant There is no guarding or rebound.   Skin:     General: Skin is warm and dry.   Neurological:      General: No focal deficit present.      Mental Status: He is alert.   Psychiatric:         Mood and Affect: Mood normal.         Behavior: Behavior normal.           Procedures    ED Course:    ED Course as of 03/13/24 0055   Tue Mar 12, 2024   2137 WBC(!): 13.63 [TM]   2137 Lipase: 16 [TM]   2137 HS " Troponin T(!): 45 [TM]      ED Course User Index  [TM] Ronaldo Gates PA-C       Lab Results (last 24 hours)       Procedure Component Value Units Date/Time    CBC & Differential [[id number]]  (Abnormal) Collected: 03/12/24 2040    Specimen: Blood Updated: 03/12/24 2049    Narrative:      The following orders were created for panel order CBC & Differential.  Procedure                               Abnormality         Status                     ---------                               -----------         ------                     CBC Auto Differential[[id number]]        Abnormal            Final result                 Please view results for these tests on the individual orders.    Comprehensive Metabolic Panel [[id number]]  (Abnormal) Collected: 03/12/24 2040    Specimen: Blood Updated: 03/12/24 2110     Glucose 286 mg/dL      Comment: Glucose >180, Hemoglobin A1C recommended.        BUN 13 mg/dL      Creatinine 1.27 mg/dL      Sodium 132 mmol/L      Potassium 4.2 mmol/L      Chloride 95 mmol/L      CO2 24.8 mmol/L      Calcium 9.4 mg/dL      Total Protein 7.6 g/dL      Albumin 3.7 g/dL      ALT (SGPT) 17 U/L      AST (SGOT) 15 U/L      Alkaline Phosphatase 97 U/L      Total Bilirubin 0.6 mg/dL      Globulin 3.9 gm/dL      A/G Ratio 0.9 g/dL      BUN/Creatinine Ratio 10.2     Anion Gap 12.2 mmol/L      eGFR 65.9 mL/min/1.73     Narrative:      GFR Normal >60  Chronic Kidney Disease <60  Kidney Failure <15      Lipase [[id number]]  (Normal) Collected: 03/12/24 2040    Specimen: Blood Updated: 03/12/24 2110     Lipase 16 U/L     Single High Sensitivity Troponin T [[id number]]  (Abnormal) Collected: 03/12/24 2040    Specimen: Blood Updated: 03/12/24 2115     HS Troponin T 45 ng/L     Narrative:      High Sensitive Troponin T Reference Range:  <14.0 ng/L- Negative Female for AMI  <22.0 ng/L- Negative Male for AMI  >=14 - Abnormal Female indicating possible myocardial injury.  >=22 - Abnormal Male indicating  possible myocardial injury.   Clinicians would have to utilize clinical acumen, EKG, Troponin, and serial changes to determine if it is an Acute Myocardial Infarction or myocardial injury due to an underlying chronic condition.         CBC Auto Differential [[id number]]  (Abnormal) Collected: 03/12/24 2040    Specimen: Blood Updated: 03/12/24 2049     WBC 13.63 10*3/mm3      RBC 4.70 10*6/mm3      Hemoglobin 13.6 g/dL      Hematocrit 40.2 %      MCV 85.5 fL      MCH 28.9 pg      MCHC 33.8 g/dL      RDW 13.2 %      RDW-SD 41.6 fl      MPV 9.5 fL      Platelets 243 10*3/mm3      Neutrophil % 71.0 %      Lymphocyte % 16.2 %      Monocyte % 9.2 %      Eosinophil % 1.5 %      Basophil % 0.3 %      Immature Grans % 1.8 %      Neutrophils, Absolute 9.67 10*3/mm3      Lymphocytes, Absolute 2.21 10*3/mm3      Monocytes, Absolute 1.25 10*3/mm3      Eosinophils, Absolute 0.21 10*3/mm3      Basophils, Absolute 0.04 10*3/mm3      Immature Grans, Absolute 0.25 10*3/mm3      nRBC 0.0 /100 WBC     Single High Sensitivity Troponin T [[id number]]  (Abnormal) Collected: 03/12/24 2242    Specimen: Blood Updated: 03/12/24 2309     HS Troponin T 48 ng/L     Narrative:      High Sensitive Troponin T Reference Range:  <14.0 ng/L- Negative Female for AMI  <22.0 ng/L- Negative Male for AMI  >=14 - Abnormal Female indicating possible myocardial injury.  >=22 - Abnormal Male indicating possible myocardial injury.   Clinicians would have to utilize clinical acumen, EKG, Troponin, and serial changes to determine if it is an Acute Myocardial Infarction or myocardial injury due to an underlying chronic condition.                  CT Abdomen Pelvis With Contrast    Result Date: 3/13/2024  FINAL REPORT TECHNIQUE: null CLINICAL HISTORY: RUQ abd pain COMPARISON: null FINDINGS: CT abdomen and pelvis with contrast Comparison: None Findings: There is mild bibasilar atelectasis. The liver, pancreas, spleen, and adrenal glands are unremarkable. There are few  tiny stones in the gallbladder. Gallbladder appears otherwise normal. There is a punctate nonobstructing left renal stone. Right kidney is unremarkable. The proximal portion of the appendix is dilated. The distal portion of the appendix is ruptured with an ill-defined developing abscess containing foci of gas measuring about 4.5 x 3.7 cm. There is reactive wall thickening of the distal sigmoid colon which passes immediately adjacent to the collection. There is no bowel obstruction. Aorta is normal diameter. There are no enlarged lymph nodes. There is no acute fracture or suspicious lytic or sclerotic lesion. There is a chronic appearing mild superior endplate compression fracture of L5.     Impression: Impression: 1. Findings consistent with perforated appendicitis with ill-defined developing periappendiceal abscess. 2. Chronic findings as above. Authenticated and Electronically Signed by Musa Esquivel MD on 03/13/2024 12:20:57 AM                           Medical Decision Making  Amount and/or Complexity of Data Reviewed  Labs: ordered. Decision-making details documented in ED Course.  Radiology: ordered.  ECG/medicine tests: ordered.    Risk  Prescription drug management.              Deep Mendez is a 58 y.o. male who presents to the emergency department for evaluation of right upper quadrant abdominal pain and nausea    Differential diagnosis includes cystitis, biliary colic, pancreatitis, among other etiologies.    CBC, CMP, ordered for further evaluation of the patient's presentation.    Chart review if available included outside testing, previous visits, prior labs, prior imaging, available notes from prior evaluations or visits with specialists, medication list, allergies, past medical history, past surgical history when applicable.    Patient was treated with   Medications   sodium chloride 0.9 % flush 10 mL (has no administration in time range)   sodium chloride 0.9 % bolus 1,000 mL (has no  administration in time range)   piperacillin-tazobactam (ZOSYN) 4.5 g in iso-osmotic dextrose 100 mL IVPB (premix) (has no administration in time range)   morphine injection 4 mg (4 mg Intravenous Given 3/12/24 2240)   ondansetron (ZOFRAN) injection 4 mg (4 mg Intravenous Given 3/12/24 2240)   iopamidol (ISOVUE-300) 61 % injection 100 mL (100 mL Intravenous Given 3/12/24 2330)       Patient has perforated appendicitis on CT scan.  Did discuss case with Dr. Zayas requested Zosyn IV fluids admission to the hospitalist and she will see tomorrow.      Plan for disposition is Admission to the hospital.  Patient/family comfortable with and understanding of the plan.      Final diagnoses:   Perforated appendicitis          Ronaldo Gates PA-C  03/13/24 0055      Electronically signed by Lucius Iraheta MD at 03/13/24 0144       Vital Signs (last day)       Date/Time Temp Temp src Pulse Resp BP Patient Position SpO2    03/14/24 1151 97.7 (36.5) Oral 91 18 115/66 Sitting 95    03/14/24 0722 97.7 (36.5) Oral 96 18 166/91 Lying 97    03/14/24 0350 97.1 (36.2) Temporal 92 18 136/75 Lying 94    03/13/24 2332 97.6 (36.4) Temporal 107 18 157/81 Lying 95    03/13/24 1938 97.8 (36.6) Temporal 114 18 167/95 Lying 92    03/13/24 1758 97.4 (36.3) Oral 106 18 158/98 Lying 89    03/13/24 1730 -- -- 103 16 135/90 Lying 94    03/13/24 1720 -- -- 104 16 123/77 Lying 94    03/13/24 1710 -- -- 100 16 125/79 Lying 93    03/13/24 1700 98.8 (37.1) Temporal 102 16 126/76 Lying 94    03/13/24 1650 -- -- 101 14 131/66 Lying 93    03/13/24 1641 -- -- 98 16 138/69 Lying 93    03/13/24 1630 -- -- 96 16 140/69 Lying 95    03/13/24 1625 -- -- 99 16 129/73 Lying 94    03/13/24 1620 -- -- 96 16 125/67 Lying 90    03/13/24 1615 -- -- 95 14 118/69 Lying 92    03/13/24 1611 -- -- 94 16 136/70 Lying 97    03/13/24 1610 -- -- 92 14 136/70 Lying 96    03/13/24 1605 -- -- 93 14 134/69 Lying 95    03/13/24 1600 -- -- 92 16 128/68 Lying 94    03/13/24  1555 -- -- 93 18 146/76 Lying 91    03/13/24 1550 -- -- 93 16 144/71 Lying 89    03/13/24 1545 97.5 (36.4) Temporal 95 16 161/75 Lying 90    03/13/24 1230 98.4 (36.9) Temporal 91 18 134/71 Lying 94    03/13/24 1143 -- -- 92 -- -- -- 94    03/13/24 1043 -- -- 90 -- -- -- 93    03/13/24 0943 -- -- 90 -- -- -- 93    03/13/24 0843 -- -- 94 -- -- -- 94    03/13/24 0800 -- -- 90 -- 133/76 -- 96    03/13/24 0743 -- -- 89 -- -- -- 94    03/13/24 0600 -- -- 91 -- 118/73 -- 93    03/13/24 0415 -- -- 91 -- -- -- 91    03/13/24 0400 -- -- 90 -- 114/65 -- 93    03/13/24 0345 -- -- 88 -- -- -- 92    03/13/24 0330 -- -- 89 -- -- -- 92    03/13/24 0315 -- -- 92 -- -- -- 93    03/13/24 0200 -- -- 98 -- 134/85 -- 94    03/13/24 0030 -- -- -- -- 133/82 -- 95    03/13/24 0000 -- -- -- -- 149/87 -- 95          Current Facility-Administered Medications   Medication Dose Route Frequency Provider Last Rate Last Admin    acetaminophen (TYLENOL) tablet 650 mg  650 mg Oral Q4H PRN Yazmin Zayas MD        Or    acetaminophen (TYLENOL) 160 MG/5ML oral solution 650 mg  650 mg Oral Q4H PRN Yazmin Zayas MD        Or    acetaminophen (TYLENOL) suppository 650 mg  650 mg Rectal Q4H PRN Yazmin Zayas MD        sennosides-docusate (PERICOLACE) 8.6-50 MG per tablet 2 tablet  2 tablet Oral BID PRN Yazmin Zayas MD        And    polyethylene glycol (MIRALAX) packet 17 g  17 g Oral Daily PRN Yazmin Zayas MD        And    bisacodyl (DULCOLAX) EC tablet 5 mg  5 mg Oral Daily PRN Yazmin Zayas MD        And    bisacodyl (DULCOLAX) suppository 10 mg  10 mg Rectal Daily PRN Yazmin Zayas MD        dextrose (D50W) (25 g/50 mL) IV injection 25 g  25 g Intravenous Q15 Min PRN Yazmin Zayas MD        dextrose (GLUTOSE) oral gel 15 g  15 g Oral Q15 Min PRN Yazmin Zayas MD        Enoxaparin Sodium (LOVENOX) syringe 40 mg  40 mg Subcutaneous Q24H Yazmin Zayas MD   40 mg at 03/14/24 0629    glucagon (GLUCAGEN) injection 1 mg   1 mg Intramuscular Q15 Min PRN Yazmin Zayas MD        HYDROcodone-acetaminophen (NORCO) 7.5-325 MG per tablet 1 tablet  1 tablet Oral Q4H PRN Yazmin Zayas MD   1 tablet at 03/14/24 0629    [START ON 3/15/2024] insulin detemir (LEVEMIR) injection 10 Units  10 Units Subcutaneous Daily MauraJanel dalal, APRCRISTY        insulin regular (humuLIN R,novoLIN R) injection 2-9 Units  2-9 Units Subcutaneous Q6H Yazmin Zayas MD   4 Units at 03/14/24 0629    losartan (COZAAR) tablet 50 mg  50 mg Oral Q24H MauraJanel dalal APRCRISTY        metoprolol succinate XL (TOPROL-XL) 24 hr tablet 25 mg  25 mg Oral Q24H Maura, Janel BEY APRCRISTY        Morphine sulfate (PF) injection 2 mg  2 mg Intravenous Q4H PRN Yazmin Zayas MD        And    naloxone (NARCAN) injection 0.4 mg  0.4 mg Intravenous Q5 Min PRN Yazmin Zayas MD        ondansetron (ZOFRAN) injection 4 mg  4 mg Intravenous Q6H PRN Yazmin Zayas MD        Pharmacy to Dose enoxaparin (LOVENOX)   Does not apply Continuous PRN Yazmin Zayas MD        Pharmacy to Dose Zosyn   Does not apply Continuous PRN Yazmin Zayas MD        piperacillin-tazobactam (ZOSYN) 4.5 g in iso-osmotic dextrose 100 mL IVPB (premix)  4.5 g Intravenous Q8H Yazmin Zayas MD   4.5 g at 03/14/24 1013    sodium chloride 0.9 % flush 10 mL  10 mL Intravenous PRN Yazmin Zayas MD        sodium chloride 0.9 % flush 10 mL  10 mL Intravenous Q12H Yazmin Zayas MD   10 mL at 03/14/24 0906    sodium chloride 0.9 % flush 10 mL  10 mL Intravenous PRN Yazmin Zayas MD        sodium chloride 0.9 % infusion 40 mL  40 mL Intravenous PRN Yazmin Zayas MD        sodium chloride 0.9 % infusion  100 mL/hr Intravenous Continuous Yazmin Zayas  mL/hr at 03/14/24 0501 100 mL/hr at 03/14/24 0501     Lab Results (last 24 hours)       Procedure Component Value Units Date/Time    TISSUE EXAM, [facility] (GEN,COR,[facility]) [[id number]] Collected: 03/13/24 1530    Specimen: Tissue from  Large Intestine, Appendix Updated: 03/14/24 0810    POC Glucose Once [[id number]]  (Abnormal) Collected: 03/14/24 0756    Specimen: Blood Updated: 03/14/24 0807     Glucose 192 mg/dL      Comment: Serial Number: [id number]:  [id number]       Basic Metabolic Panel [[id number]]  (Abnormal) Collected: 03/14/24 0707    Specimen: Blood Updated: 03/14/24 0744     Glucose 228 mg/dL      BUN 13 mg/dL      Creatinine 1.24 mg/dL      Sodium 133 mmol/L      Potassium 4.9 mmol/L      Comment: Slight hemolysis detected by analyzer. Result may be falsely elevated.        Chloride 101 mmol/L      CO2 22.9 mmol/L      Calcium 8.7 mg/dL      BUN/Creatinine Ratio 10.5     Anion Gap 9.1 mmol/L      eGFR 67.8 mL/min/1.73     Narrative:      GFR Normal >60  Chronic Kidney Disease <60  Kidney Failure <15      CBC & Differential [[id number]]  (Abnormal) Collected: 03/14/24 0707    Specimen: Blood Updated: 03/14/24 0726    Narrative:      The following orders were created for panel order CBC & Differential.  Procedure                               Abnormality         Status                     ---------                               -----------         ------                     CBC Auto Differential[[id number]]        Abnormal            Final result                 Please view results for these tests on the individual orders.    CBC Auto Differential [[id number]]  (Abnormal) Collected: 03/14/24 0707    Specimen: Blood Updated: 03/14/24 0726     WBC 14.64 10*3/mm3      RBC 4.73 10*6/mm3      Hemoglobin 13.8 g/dL      Hematocrit 40.8 %      MCV 86.3 fL      MCH 29.2 pg      MCHC 33.8 g/dL      RDW 13.2 %      RDW-SD 41.1 fl      MPV 9.8 fL      Platelets 251 10*3/mm3      Neutrophil % 80.5 %      Lymphocyte % 11.4 %      Monocyte % 6.9 %      Eosinophil % 0.1 %      Basophil % 0.3 %      Immature Grans % 0.8 %      Neutrophils, Absolute 11.79 10*3/mm3      Lymphocytes, Absolute 1.67 10*3/mm3      Monocytes, Absolute 1.01 10*3/mm3       Eosinophils, Absolute 0.01 10*3/mm3      Basophils, Absolute 0.04 10*3/mm3      Immature Grans, Absolute 0.12 10*3/mm3      nRBC 0.0 /100 WBC     POC Glucose Once [[id number]]  (Abnormal) Collected: 03/14/24 0557    Specimen: Blood Updated: 03/14/24 0559     Glucose 226 mg/dL      Comment: Serial Number: [id number]:  [id number]       Blood Culture - Blood, Hand, Right [[id number]]  (Normal) Collected: 03/13/24 0109    Specimen: Blood from Hand, Right Updated: 03/14/24 0130     Blood Culture No growth at 24 hours    Blood Culture - Blood, Arm, Left [[id number]]  (Normal) Collected: 03/13/24 0112    Specimen: Blood from Arm, Left Updated: 03/14/24 0130     Blood Culture No growth at 24 hours    POC Glucose Once [[id number]]  (Abnormal) Collected: 03/14/24 0003    Specimen: Blood Updated: 03/14/24 0006     Glucose 271 mg/dL      Comment: Serial Number: [id number]:  [id number]       POC Glucose Once [[id number]]  (Abnormal) Collected: 03/13/24 2021    Specimen: Blood Updated: 03/13/24 2025     Glucose 273 mg/dL      Comment: Serial Number: [id number]:  [id number]       POC Glucose Once [[id number]]  (Abnormal) Collected: 03/13/24 1813    Specimen: Blood Updated: 03/13/24 1816     Glucose 211 mg/dL      Comment: Serial Number: [id number]:  [id number]       POC Glucose 4x Daily Before Meals & at Bedtime [[id number]]  (Normal) Collected: 03/13/24 1209    Specimen: Blood Updated: 03/13/24 1223     Glucose 129 mg/dL      Comment: Serial Number: [id number]:  [id number]             Imaging Results (Last 24 Hours)       ** No results found for the last 24 hours. **             Operative/Procedure Notes (last 24 hours)        Yazmin Zayas MD at 03/13/24 1408          PROCEDURE DATE: 03/13/2024    SURGEON: Yazmin Zayas MD, [facility]    PREOPERATIVE DIAGNOSIS: Acute appendicitis with perforation, localized peritonitis, abscess, and gangrene     POSTOPERATIVE DIAGNOSIS: same    PROCEDURE: Laparoscopic  Appendectomy    ANESTHESIA: GETA    EBL: 25mL    IMPLANTS:  Implant Name Type Inv. Item Serial No.  Lot No. LRB No. Used Action   CLIPAPPLR M/ ENDO LIGAMAX5 5MM 33CM MD/LG - [id number] Implant CLIPAPPLR M/ ENDO LIGAMAX5 5MM 33CM MD/LG  [employer] [id number] N/A 1 Implanted   RELOAD ECHEL[employer] ENDOPATH GST 45MM LULÚ - [id number] Implant RELOAD ECHELON ENDOPATH GST 45MM LULÚ  [employer] [id number] N/A 1 Implanted     SPECIMENS:       Specimens       ID Source Type Tests Collected By Collected At Frozen?    A Large Intestine, Appendix Tissue TISSUE EXAM, [facility] (GEN, COR, [facility]AD)   Kaye Rocha, RN 3/13/24 9646 No    Description: perforated appendix     INDICATIONS FOR THE PROCEDURE:  Mr. Mendez is a 57 yo male patient who presented overnight with the acute onset of severe pain during a bowel movement in the right lower quadrant.  His workup ultimately demonstrated evidence of perforated appendicitis with early abscess formation. I recommended appendectomy for definitive management.  We also discussed nonoperative management with possible drain placement.  The contents of this conversation as documented in the surgical consultation note.  Ultimately the patient was agreeable to proceeding with surgical intervention, and provided informed consent to proceed.  He is now being brought to the operating room for the same.    DESCRIPTION OF THE PROCEDURE:  The patient was seen and examined in the preoperative holding area on the day of surgery and there are no changes to the medical history.  The patient was then taken to the operating room and placed supine on the operating table where a timeout is performed using the WHO checklist.  The patient received appropriate preoperative antibiotics as well as subcutaneous heparin for DVT prophylaxis.    Following the satisfactory induction of general anesthesia, a Ledezma catheter was inserted for decompression of the patient's bladder.  The  patient's abdomen was then prepped and draped in the usual sterile fashion.  A vertical incision was made just above the umbilicus and was carried through the soft tissue to the level of the fascia.  The fascia was grasped and elevated between Kocher clamps and incised sharply with a knife.  Entry was confirmed into the peritoneum visually and there was no bowel visible in the vicinity of this incision.  Two stay sutures of 0 Vicryl were placed in either side of the fascia and a Champagne cannula was inserted under direct visualization.  The sutures were used to secure the cannula.    The abdomen was insufflated with carbon dioxide to a pressure of 15 mmHg and the laparoscope was introduced.  The abdomen was inspected and no injuries were noted from initial trocar placement.  Following this, 2 additional 5 mm ports were placed in the left lateral and suprapubic positions under direct visualization.  The patient was placed into the Trendelenburg position with the left side down.  There was dense inflammatory change in the right lower quadrant, and the bowel loops were noted to be somewhat dilated in this region secondary to the inflammation.  The suction cannula was used to bluntly peeled the small bowel and cecum away from the lateral abdominal sidewall at which time an abscess cavity was entered, and a copious amount of david pus under pressure was revealed.  This was quickly evacuated from the field, and further blunt dissection was used to unroofed the entire abscess cavity.  Eventually, I was able to identify the remnant of a nearly completely necrotic appendix.  There was obvious evidence of appendiceal perforation.  There was a tremendous amount of inflammatory reaction within the abscess cavity with a thick fibrinous rind.  Blunt dissection was used to separate the structures which had become adherent to the abscess cavity, in hopes of identifying and restoring normal anatomy.  I was able to identify the fold of  Treves and the associated ileocecal junction.  Further blunt dissection was used to elevate the residual necrotic appendix from the abscess cavity for removal.  In doing so, the appendicular artery was identified, and secured with application of clips x 2.  The mesoappendix was also necrotic such that it could not be transected with a linear cutting stapler.  Eventually, I was able to identify that there was some remaining appendiceal base that seemed to have potential for transection with a stapling device, while allowing for enough healthy tissue to maintain a staple line on the cecum.  In order to facilitate this, the hook cautery was used to mobilize the cecum by dividing the lateral attachments along the white line of Toldt.  The avascular plane was entered, and blunt dissection was used to mobilize the cecum lateral to medial.  Once this was done, I was able to gently grasp the remaining appendiceal base, and passed a linear cutting stapler across this area successfully.  A single fire of the linear cutting stapler was then used to transect the residual appendiceal base, and after the stapler was fired the staple line was inspected and appeared secure.  I additionally secured the staple line with application of 5 mm clips.  The staple lines were inspected and hemostasis was found to be acceptable.  The appendix was then placed into an Endo Catch bag.  The operative field was then copiously irrigated and inspected for hemostasis.  This was found to be acceptable.  A brief survey of the remainder of the peritoneal cavity revealed no additional abnormalities.  A 10 mm flat BRITTANIE drain was placed into the right lower quadrant and brought out the left lower quadrant port site.  It was secured to the skin with a 3-0 Prolene suture and connected to bulb suction.  The suprapubic the 5mm abdominal trocar were removed under direct visualization and no bleeding was noted.  The laparoscope was withdrawn and the abdomen was  desufflated.  The Champagne cannula was removed out of the umbilical port site followed by the appendix which was completely contained within the Endo Catch bag.  This was passed off the field as specimen.  The fascia of the umbilical port site was then closed using a 0 Vicryl suture placed in a figure-of-eight fashion ×2.  The skin of all incisions was closed using a 5-0 PDS suture placed in a subcuticular fashion.  0.25% Marcaine was injected into the wounds for postoperative analgesia.  Mastisol and steri strips were applied to the wounds and covered with dry sterile dressings.    There were no immediate complications noted and the procedure was well tolerated by the patient.  All sponge, needle,  and instrument  counts were correct at the conclusion of procedure.  Dr. Zayas was present scrubbed for the procedure and its entirety.  The patient was awakened from anesthesia and taken to the recovery room in good condition.        Electronically signed by Yazmin Zayas MD at 24 1703          Physician Progress Notes (last 24 hours)        Janel Lorenzo APRN at 24 1022              [facility]    Name:  Deep Mendez   Age:  58 y.o.  Sex:  male  :  1965  MRN:  [id number]   Visit Number:  [id number]  Admission Date:  3/12/2024  Date Of Service:  24  Primary Care Physician:  Sami Fisher MD     LOS: 0 days :    Chief Complaint:      Abdominal pain    Subjective:    Patient seen and examined with no family at bedside.  He is status post laparoscopic appendectomy per Dr. Zayas.  He is tolerating clear liquids.  States that pain is controlled.  Denied being on antihypertensives in the past.  Only takes diabetes medications.  Denies further concerns.    Hospital Course:    Deep Mendez is a 58-year old male with history of hypertension and diabetes mellitus type 2 on insulin and Trulicity was brought to the emergency room by his friend with  right lower quadrant abdominal pain that started earlier in the day.  Patient stated that he was in his usual state of health, went to the bathroom had a bowel movement, subsequently developed right upper and lower quadrant pain that was sharp and radiating up into the chest.  He had some nausea but no vomiting.  No prior history of abdominal surgeries.  He is diabetic and is status post below knee amputation.  No fevers.     In the emergency room, he was afebrile and hemodynamically stable saturating at 97% on room air but his blood pressure was elevated at 164/88.  Initial troponin 45 with a repeat at 48.  CMP was unremarkable except for a sodium of 132 and a glucose of 286.  Lipase normal at 16.  CBC unremarkable except for a white count of 13.6.  CT of the abdomen and pelvis done in the emergency room showed findings consistent with perforated appendicitis with ill-defined developing periappendiceal abscess.  Nonobstructing punctate left renal stones were noted.  Patient's condition was discussed with Dr. Zayas from surgical services who recommended antibiotics therapy and admission to the hospital.  Patient was given morphine, Zofran and IV Zosyn in the emergency room.  He also received a liter of normal saline bolus and was  admitted to the medical floor with telemetry for perforated appendicitis.  Otherwise reassuring labs and vitals noted.  A1c noted to be 9.3 with prior 12.  Patient taken for laparoscopic appendectomy per general surgeon Dr. Zayas on 3/13/2024 without any acute complications noted.  Patient was noted to have large abscess intraoperatively for which BRITTANIE drain was placed as well.  IV antibiotics continued.  Initiated antihypertensives.       Review of Systems:     All systems were reviewed and negative except as mentioned in subjective, assessment and plan.    Vital Signs:    Temp:  [97.1 °F (36.2 °C)-98.8 °F (37.1 °C)] 97.7 °F (36.5 °C)  Heart Rate:  [] 96  Resp:  [14-18] 18  BP:  (118-167)/(66-98) 166/91    Intake and output:    I/O last 3 completed shifts:  In: 2300 [I.V.:1200; IV Piggyback:1100]  Out: 1100 [Urine:800; Drains:300]  I/O this shift:  In: 240 [P.O.:240]  Out: 75 [Drains:75]    Physical Examination:    General Appearance:  Alert and cooperative.  Chronically ill middle-age male.   Head:  Atraumatic and normocephalic.   Eyes: Conjunctivae and sclerae normal, no icterus. No pallor.   Throat: No oral lesions, no thrush, oral mucosa moist.   Neck: Supple, trachea midline, no thyromegaly.   Lungs:   Breath sounds heard bilaterally equally.  No wheezing or crackles. No Pleural rub or bronchial breathing.  On room air unlabored.   Heart:  Normal S1 and S2, no murmur, no gallop, no rub. No JVD.   Abdomen:   Normal bowel sounds, no masses, no organomegaly. Soft, nontender, nondistended, no rebound tenderness.  Slight discomfort generalized.  BRITTANIE drain noted to left abdomen with serosanguineous drainage.  Lap sites noted.   Extremities: Supple, right lower extremity edema, no cyanosis, no clubbing.  Left prosthetic leg noted below the knee amputation   Skin: No bleeding or rash.  Right lower extremity chronic venous stasis noted.   Neurologic: Alert and oriented x 3. No facial asymmetry. Moves all four limbs. No tremors.      Laboratory results:    Results from last 7 days   Lab Units 03/14/24  0707 03/13/24  0553 03/12/24  2040   SODIUM mmol/L 133* 134* 132*   POTASSIUM mmol/L 4.9 4.1 4.2   CHLORIDE mmol/L 101 100 95*   CO2 mmol/L 22.9 22.8 24.8   BUN mg/dL 13 13 13   CREATININE mg/dL 1.24 1.30* 1.27   CALCIUM mg/dL 8.7 8.4* 9.4   BILIRUBIN mg/dL  --   --  0.6   ALK PHOS U/L  --   --  97   ALT (SGPT) U/L  --   --  17   AST (SGOT) U/L  --   --  15   GLUCOSE mg/dL 228* 143* 286*     Results from last 7 days   Lab Units 03/14/24  0707 03/13/24  0553 03/12/24  2040   WBC 10*3/mm3 14.64* 12.39* 13.63*   HEMOGLOBIN g/dL 13.8 12.4* 13.6   HEMATOCRIT % 40.8 36.8* 40.2   PLATELETS 10*3/mm3 251  "225 243         Results from last 7 days   Lab Units 03/12/24  2242 03/12/24  2040   HSTROP T ng/L 48* 45*     Results from last 7 days   Lab Units 03/13/24  0112 03/13/24  0109   BLOODCX  No growth at 24 hours No growth at 24 hours     No results for input(s): \"PHART\", \"ZBU8NXM\", \"PO2ART\", \"CMS9OIR\", \"BASEEXCESS\" in the last 8760 hours.   I have reviewed the patient's laboratory results.    Radiology results:    CT Abdomen Pelvis With Contrast    Result Date: 3/13/2024  FINAL REPORT TECHNIQUE: null CLINICAL HISTORY: RUQ abd pain COMPARISON: null FINDINGS: CT abdomen and pelvis with contrast Comparison: None Findings: There is mild bibasilar atelectasis. The liver, pancreas, spleen, and adrenal glands are unremarkable. There are few tiny stones in the gallbladder. Gallbladder appears otherwise normal. There is a punctate nonobstructing left renal stone. Right kidney is unremarkable. The proximal portion of the appendix is dilated. The distal portion of the appendix is ruptured with an ill-defined developing abscess containing foci of gas measuring about 4.5 x 3.7 cm. There is reactive wall thickening of the distal sigmoid colon which passes immediately adjacent to the collection. There is no bowel obstruction. Aorta is normal diameter. There are no enlarged lymph nodes. There is no acute fracture or suspicious lytic or sclerotic lesion. There is a chronic appearing mild superior endplate compression fracture of L5.     Impression: Impression: 1. Findings consistent with perforated appendicitis with ill-defined developing periappendiceal abscess. 2. Chronic findings as above. Authenticated and Electronically Signed by Musa Esquivel MD on 03/13/2024 12:20:57 AM   I have reviewed the patient's radiology reports.    Medication Review:     I have reviewed the patient's active and prn medications.     Problem List:      Acute appendicitis with perforation, localized peritonitis, abscess, and gangrene    Type 2 diabetes " mellitus, with long-term current use of insulin    Essential hypertension      Assessment    Acute perforated appendicitis with abscess status post laparoscopic appendectomy, POA.  Diabetes mellitus type 2 with hyperglycemia, POA.  Essential hypertension.  Obesity with a BMI of 37.  Status post left BKA with prosthesis      Plan:    Acute perforated appendicitis.  -Status post laparoscopic appendectomy 3/13/2024  - Patient will be continued on IV antibiotics therapy with Zosyn.  - Morphine and Zofran for pain control and nausea.  - Dr. Zayas following.  Appreciate recommendations.  - Clear liquid diet.  -PT/OT/case management consulted.     Diabetes mellitus type 2.  - Continue Levemir with up titration due to hyperglycemia.  - Subcutaneous insulin protocol for coverage.  - A1c 9.3.  -Diabetic educator consulted.    Hypertension  -Does not currently take any home medications for this.  -Cozaar and Toprol-XL added as patient tachycardic as well.    I have reviewed the copied text and it is accurate as of 3/14/2024      DVT Prophylaxis: Enoxaparin  Code Status: Full  Diet: Clears  Discharge Plan: Several days.    KAILEE Gillis  03/14/24  10:22 EDT    Dictated utilizing Dragon dictation.      Electronically signed by Janel Lorenzo APRN at 03/14/24 1028          Consult Notes (last 24 hours)        Quinton Rivera CRNA at 03/14/24 0846          Patient: Deep Mendez  Procedure(s):  APPENDECTOMY LAPAROSCOPIC  Anesthesia type: general    Patient location: [facility] Surgical Floor  Last vitals:   Vitals:    03/14/24 0722   BP: 166/91   Pulse: 96   Resp: 18   Temp: 97.7 °F (36.5 °C)   SpO2: 97%     Level of consciousness: awake, alert, and oriented    Post-anesthesia pain: adequate analgesia  Airway patency: patent  Respiratory: unassisted  Cardiovascular: stable and blood pressure at baseline  Hydration: euvolemic    Anesthetic complications: no    Electronically signed by Quinton Rivera CRNA at 03/14/24  0833

## 2024-03-14 NOTE — CASE MANAGEMENT/SOCIAL WORK
Discharge Planning Assessment   [place]     Patient Name: Deep Mendez  MRN: [id number]  Today's Date: 3/14/2024    Admit Date: 3/12/2024    Plan: Home with family   Discharge Needs Assessment       Row Name 03/14/24 1141       Living Environment    People in Home significant other    Current Living Arrangements home    Potentially Unsafe Housing Conditions none    In the past 12 months has the electric, gas, oil, or water company threatened to shut off services in your home? No    Primary Care Provided by self    Provides Primary Care For no one    Able to Return to Prior Arrangements yes       Resource/Environmental Concerns    Resource/Environmental Concerns none    Transportation Concerns none       Transportation Needs    In the past 12 months, has lack of transportation kept you from medical appointments or from getting medications? no    In the past 12 months, has lack of transportation kept you from meetings, work, or from getting things needed for daily living? No       Food Insecurity    Within the past 12 months, you worried that your food would run out before you got the money to buy more. Never true    Within the past 12 months, the food you bought just didn't last and you didn't have money to get more. Never true       Transition Planning    Patient/Family Anticipates Transition to home with family    Patient/Family Anticipated Services at Transition none    Transportation Anticipated family or friend will provide       Discharge Needs Assessment    Readmission Within the Last 30 Days no previous admission in last 30 days    Equipment Currently Used at Home prosthesis;walker, rolling;wheelchair    Concerns to be Addressed no discharge needs identified    Anticipated Changes Related to Illness none    Equipment Needed After Discharge none                   Discharge Plan       Row Name 03/14/24 1142       Plan    Plan Home with family    Patient/Family in Agreement with Plan yes    Plan Comments  Met with patient at bedside.Verified patient's address, phone number, contacts, physician and pharmacy. Patient has adequate transportation. Reports no financial or food insecurity. Patient lives in a duplex with his significant other. He uses a prosthesis, walker, and has a wheelchair. Patient wants to return home when ready for discharge. He is agreeable to meds to beds. Has used [facility] home health in the past.    Final Discharge Disposition Code 01 - home or self-care                  Continued Care and Services - Admitted Since 3/12/2024    No active coordination exists for this encounter.          Demographic Summary       Row Name 03/14/24 1141       General Information    Admission Type observation    Arrived From emergency department    Required Notices Provided Observation Status Notice    Referral Source admission list    Reason for Consult discharge planning    Preferred Language English       Contact Information    Permission Granted to Share Info With                    Functional Status       Row Name 03/14/24 1141       Functional Status    Usual Activity Tolerance fair    Current Activity Tolerance fair       Physical Activity    On average, how many days per week do you engage in moderate to strenuous exercise (like a brisk walk)? 0 days    On average, how many minutes do you engage in exercise at this level? 0 min    Number of minutes of exercise per week 0       Assessment of Health Literacy    How often do you have someone help you read hospital materials? Occasionally    How often do you have problems learning about your medical condition because of difficulty understanding written information? Occasionally    How often do you have a problem understanding what is told to you about your medical condition? Occasionally    How confident are you filling out medical forms by yourself? Quite a bit    Health Literacy Good       Functional Status, IADL    Medications assistive equipment     Meal Preparation assistive equipment    Housekeeping assistive equipment    Laundry assistive equipment    Shopping assistive equipment                   Psychosocial       Row Name 03/14/24 1141       Values/Beliefs    Spiritual, Cultural Beliefs, Jainism Practices, Values that Affect Care no       Mental Health    Little Interest or Pleasure in Doing Things 0-->not at all    Feeling Down, Depressed or Hopeless 0-->not at all       Stress    Do you feel stress - tense, restless, nervous, or anxious, or unable to sleep at night because your mind is troubled all the time - these days? To some exte       Coping/Stress    Major Change/Loss/Stressor illness    Patient Personal Strengths able to adapt;resilient    Sources of Support significant other    Techniques to [place] with Loss/Stress/Change diversional activities    Reaction to Health Status accepting    Understanding of Condition and Treatment adequate understanding of medical condition;adequate understanding of treatment       Developmental Stage (Eriksson's)    Developmental Stage Stage 7 (35-65 years/Middle Adulthood) Generativity vs. Stagnation       C-SSRS (Recent)    Q1 Wished to be Dead (Past Month) no    Q2 Suicidal Thoughts (Past Month) no    Q6 Suicide Behavior (Lifetime) no       Violence Risk    Feels Like Hurting Others no    Previous Attempt to Harm Others no                   Abuse/Neglect    No documentation.                  Legal    No documentation.                  Substance Abuse    No documentation.                  Patient Forms    No documentation.                     Sadiq Beebe RN

## 2024-03-14 NOTE — CONSULTS
"Diabetes Education  Assessment/Teaching    Patient Name:  Deep Mendez  YOB: 1965  MRN: [id number]  Admit Date:  3/12/2024      Assessment Date:  3/14/2024  Flowsheet Row Most Recent Value   General Information     Height 182.9 cm (72\")   Height Method Stated   Weight 119 kg (261 lb 7.5 oz)   Weight Method Bed scale   Pregnancy Assessment    Diabetes History    What type of diabetes do you have? Type 2   Length of Diabetes Diagnosis 10 + years  [probably around 20 years per pt.]   Current DM knowledge fair   Do you test your blood sugar at home? yes   Frequency of checks maybe once or twice daily   Who performs the test? wife   Typical readings 100-300 prior to meals   Have you had low blood sugar? (<70mg/dl) yes  [Yes, but this was years ago]   How often do you have low blood sugar? rare   Have you had high blood sugar? (>140mg/dl) yes   How often do you have high blood sugar? frequently   When was your last high blood sugar? on admit 286   When was the last time your doctor checked your feet? pt. has a BKA and states that he had toes amputated on his foot within the last year.   What makes it difficult for you to take care of your diabetes or yourself? purchasing the food required for the healthy Plate method   Do you have any diabetes complications? amputations, circulation problems, retinopathy   Education Preferences    What areas of diabetes would you like to learn about? avoiding high blood sugar, diabetes complications, diet information, medications for diabetes, testing my blood sugar at home   Nutrition Information    Are you currently following a special meal plan? occasionally  [pt. states that he does eat alot of [employer], TV dinners, pt. snacks between meals on chips.  Pt. occasionally will drink orange juice,but all other beverages are sugar free.]   Do you eat mostly at home or out of the house? out of the house   What is the biggest challenge you have with your diet? Food " procurement   What type of support do you currently use to help you with your health issues? pt. states that he goes to a [employer] food pantry once a month, does only get a little more than $50.00 of food stamps. gets 150.00 voucher for food through his Medicare supplement.   Assessment Topics    DM Goals             Flowsheet Row Most Recent Value   DM Education Needs    Meter Has own   Frequency of Testing Daily   Blood Glucose Target 150   Blood Glucose Target Range fasting  ,2 hours post meal <180   Medication Insulin, Actions, Side effects  [Discussed pt. is guessing frequently at his Humlog correction dose.  Pt. is not taking a set prandial dose.  pt. states that he is diligent about taking his Basal insulin toujeo U-300 at 110 units daily.]   Problem Solving Hypoglycemia, Hyperglycemia, Signs, Symptoms, Treatment   Reducing Risks Eye exam, Retinopathy   Healthy Eating Reviewed meal plan, Basic meal plan provided   Physical Activity Other (comment)  [pt. has moibility issues related to his BKA and amputation of toes on remaining foot.]   Physical Activity Frequency Never   Healthy Coping Appropriate   Discharge Plan --  [pending]   Motivation Engaged   Teaching Method Explanation, Discussion, Handouts              Other Comments:  Pt. Is a 58 y.o male with >20 year history of type 2 DM.  Pt. Has retinopathy and B  KA and amputation of digits on remaining foot.  Discussed his A1c of 9.3%.  Discussed the need to lower BG to create environment of wound healing post surgery for appendicitis with rupture.  Pt. Has poor dentition.  Pt. Does eat a diet heavy in carbs and processed foods.  Discussed how to create a Healthy Plate based on everyday foods.  Pt. States that he does have food insecurity with buying foods needed for his management of DM.  Pt. States that he does not check BG frequently and when it is checked his wife does it prior to his meals for correction.  Discussed that he is missing multiple BG  "checks prior to administering correction doses and he is \"guessing \" at doses.  Discussed the importance of checking BG related to safety of hyper/hypoglycemia.  Pt. May benefit from an addition of a set prandial dose to prevent guessing at a correction and to balance out such high doses of basal insulin. Pt. Was given written material: \"diabetes Basics\", BG logs to fill out and take to each provider visit and the Healthy Plate Method for eating.  Pt. Has no questions at this time.  Pt. Was provided with my contact information and encouraged to call for any questions.        Electronically signed by:  Danica Mustafa RN  03/14/24 12:02 EDT  "

## 2024-03-15 LAB
ANION GAP SERPL CALCULATED.3IONS-SCNC: 14.8 MMOL/L (ref 5–15)
BASOPHILS # BLD AUTO: 0.03 10*3/MM3 (ref 0–0.2)
BASOPHILS NFR BLD AUTO: 0.2 % (ref 0–1.5)
BUN SERPL-MCNC: 17 MG/DL (ref 6–20)
BUN/CREAT SERPL: 12.9 (ref 7–25)
CALCIUM SPEC-SCNC: 8.1 MG/DL (ref 8.6–10.5)
CHLORIDE SERPL-SCNC: 95 MMOL/L (ref 98–107)
CO2 SERPL-SCNC: 21.2 MMOL/L (ref 22–29)
CREAT SERPL-MCNC: 1.32 MG/DL (ref 0.76–1.27)
DEPRECATED RDW RBC AUTO: 41.5 FL (ref 37–54)
EGFRCR SERPLBLD CKD-EPI 2021: 62.9 ML/MIN/1.73
EOSINOPHIL # BLD AUTO: 0.12 10*3/MM3 (ref 0–0.4)
EOSINOPHIL NFR BLD AUTO: 0.8 % (ref 0.3–6.2)
ERYTHROCYTE [DISTWIDTH] IN BLOOD BY AUTOMATED COUNT: 13.1 % (ref 12.3–15.4)
GLUCOSE BLDC GLUCOMTR-MCNC: 219 MG/DL (ref 70–130)
GLUCOSE BLDC GLUCOMTR-MCNC: 234 MG/DL (ref 70–130)
GLUCOSE BLDC GLUCOMTR-MCNC: 304 MG/DL (ref 70–130)
GLUCOSE BLDC GLUCOMTR-MCNC: 328 MG/DL (ref 70–130)
GLUCOSE SERPL-MCNC: 204 MG/DL (ref 65–99)
HCT VFR BLD AUTO: 38.8 % (ref 37.5–51)
HGB BLD-MCNC: 12.8 G/DL (ref 13–17.7)
IMM GRANULOCYTES # BLD AUTO: 0.16 10*3/MM3 (ref 0–0.05)
IMM GRANULOCYTES NFR BLD AUTO: 1 % (ref 0–0.5)
LYMPHOCYTES # BLD AUTO: 2.12 10*3/MM3 (ref 0.7–3.1)
LYMPHOCYTES NFR BLD AUTO: 13.9 % (ref 19.6–45.3)
MCH RBC QN AUTO: 28.7 PG (ref 26.6–33)
MCHC RBC AUTO-ENTMCNC: 33 G/DL (ref 31.5–35.7)
MCV RBC AUTO: 87 FL (ref 79–97)
MONOCYTES # BLD AUTO: 1 10*3/MM3 (ref 0.1–0.9)
MONOCYTES NFR BLD AUTO: 6.6 % (ref 5–12)
NEUTROPHILS NFR BLD AUTO: 11.83 10*3/MM3 (ref 1.7–7)
NEUTROPHILS NFR BLD AUTO: 77.5 % (ref 42.7–76)
NRBC BLD AUTO-RTO: 0 /100 WBC (ref 0–0.2)
PLATELET # BLD AUTO: 269 10*3/MM3 (ref 140–450)
PMV BLD AUTO: 9.7 FL (ref 6–12)
POTASSIUM SERPL-SCNC: 4.1 MMOL/L (ref 3.5–5.2)
RBC # BLD AUTO: 4.46 10*6/MM3 (ref 4.14–5.8)
REF LAB TEST METHOD: NORMAL
SODIUM SERPL-SCNC: 131 MMOL/L (ref 136–145)
WBC NRBC COR # BLD AUTO: 15.26 10*3/MM3 (ref 3.4–10.8)

## 2024-03-15 PROCEDURE — 25010000002 PIPERACILLIN SOD-TAZOBACTAM PER 1 G: Performed by: SURGERY

## 2024-03-15 PROCEDURE — 97110 THERAPEUTIC EXERCISES: CPT

## 2024-03-15 PROCEDURE — 85025 COMPLETE CBC W/AUTO DIFF WBC: CPT | Performed by: NURSE PRACTITIONER

## 2024-03-15 PROCEDURE — 25010000002 MORPHINE PER 10 MG: Performed by: SURGERY

## 2024-03-15 PROCEDURE — 99232 SBSQ HOSP IP/OBS MODERATE 35: CPT | Performed by: NURSE PRACTITIONER

## 2024-03-15 PROCEDURE — 82948 REAGENT STRIP/BLOOD GLUCOSE: CPT | Performed by: SURGERY

## 2024-03-15 PROCEDURE — 82948 REAGENT STRIP/BLOOD GLUCOSE: CPT

## 2024-03-15 PROCEDURE — 25010000002 ENOXAPARIN PER 10 MG: Performed by: SURGERY

## 2024-03-15 PROCEDURE — 80048 BASIC METABOLIC PNL TOTAL CA: CPT | Performed by: NURSE PRACTITIONER

## 2024-03-15 PROCEDURE — 63710000001 INSULIN DETEMIR PER 5 UNITS: Performed by: NURSE PRACTITIONER

## 2024-03-15 PROCEDURE — 99024 POSTOP FOLLOW-UP VISIT: CPT | Performed by: SURGERY

## 2024-03-15 PROCEDURE — 63710000001 INSULIN ASPART PER 5 UNITS: Performed by: INTERNAL MEDICINE

## 2024-03-15 RX ORDER — BISACODYL 10 MG
10 SUPPOSITORY, RECTAL RECTAL DAILY PRN
Status: DISCONTINUED | OUTPATIENT
Start: 2024-03-15 | End: 2024-03-18 | Stop reason: HOSPADM

## 2024-03-15 RX ORDER — BISACODYL 5 MG/1
5 TABLET, DELAYED RELEASE ORAL DAILY PRN
Status: DISCONTINUED | OUTPATIENT
Start: 2024-03-15 | End: 2024-03-18 | Stop reason: HOSPADM

## 2024-03-15 RX ORDER — AMOXICILLIN 250 MG
2 CAPSULE ORAL DAILY
Status: DISCONTINUED | OUTPATIENT
Start: 2024-03-15 | End: 2024-03-18 | Stop reason: HOSPADM

## 2024-03-15 RX ORDER — POLYETHYLENE GLYCOL 3350 17 G/17G
17 POWDER, FOR SOLUTION ORAL DAILY PRN
Status: DISCONTINUED | OUTPATIENT
Start: 2024-03-15 | End: 2024-03-18 | Stop reason: HOSPADM

## 2024-03-15 RX ADMIN — METOPROLOL SUCCINATE 25 MG: 25 TABLET, EXTENDED RELEASE ORAL at 09:12

## 2024-03-15 RX ADMIN — HYDROCODONE BITARTRATE AND ACETAMINOPHEN 1 TABLET: 7.5; 325 TABLET ORAL at 19:35

## 2024-03-15 RX ADMIN — MORPHINE SULFATE 2 MG: 2 INJECTION, SOLUTION INTRAMUSCULAR; INTRAVENOUS at 09:42

## 2024-03-15 RX ADMIN — INSULIN ASPART 4 UNITS: 100 INJECTION, SOLUTION INTRAVENOUS; SUBCUTANEOUS at 11:53

## 2024-03-15 RX ADMIN — Medication 10 ML: at 22:04

## 2024-03-15 RX ADMIN — PIPERACILLIN SODIUM AND TAZOBACTAM SODIUM 4.5 G: 4; .5 INJECTION, SOLUTION INTRAVENOUS at 09:31

## 2024-03-15 RX ADMIN — INSULIN ASPART 7 UNITS: 100 INJECTION, SOLUTION INTRAVENOUS; SUBCUTANEOUS at 17:04

## 2024-03-15 RX ADMIN — INSULIN ASPART 4 UNITS: 100 INJECTION, SOLUTION INTRAVENOUS; SUBCUTANEOUS at 07:46

## 2024-03-15 RX ADMIN — STANDARDIZED SENNA CONCENTRATE AND DOCUSATE SODIUM 2 TABLET: 8.6; 5 TABLET, FILM COATED ORAL at 14:14

## 2024-03-15 RX ADMIN — ACETAMINOPHEN 650 MG: 325 TABLET, FILM COATED ORAL at 02:06

## 2024-03-15 RX ADMIN — ENOXAPARIN SODIUM 40 MG: 40 INJECTION SUBCUTANEOUS at 05:55

## 2024-03-15 RX ADMIN — ATORVASTATIN CALCIUM 40 MG: 40 TABLET, FILM COATED ORAL at 09:12

## 2024-03-15 RX ADMIN — PIPERACILLIN SODIUM AND TAZOBACTAM SODIUM 4.5 G: 4; .5 INJECTION, SOLUTION INTRAVENOUS at 02:06

## 2024-03-15 RX ADMIN — Medication 10 ML: at 09:12

## 2024-03-15 RX ADMIN — INSULIN ASPART 7 UNITS: 100 INJECTION, SOLUTION INTRAVENOUS; SUBCUTANEOUS at 22:04

## 2024-03-15 RX ADMIN — FUROSEMIDE 40 MG: 40 TABLET ORAL at 09:11

## 2024-03-15 RX ADMIN — HYDROCODONE BITARTRATE AND ACETAMINOPHEN 1 TABLET: 7.5; 325 TABLET ORAL at 02:12

## 2024-03-15 RX ADMIN — PIPERACILLIN SODIUM AND TAZOBACTAM SODIUM 4.5 G: 4; .5 INJECTION, SOLUTION INTRAVENOUS at 17:04

## 2024-03-15 RX ADMIN — INSULIN DETEMIR 15 UNITS: 100 INJECTION, SOLUTION SUBCUTANEOUS at 09:13

## 2024-03-15 NOTE — PLAN OF CARE
Goal Outcome Evaluation:  Plan of Care Reviewed With: patient        Progress: improving  Outcome Evaluation: Pt received sitting up in his chair, willing to do UB ex with OT.  Pt completed ther ex using red theraband for resistance.  See flowsheet for details.  Pt completed grooming tasks with set up of necessary items.  Pt declined getting out of the chair this afternoon, states he is comfortable.  Pt left sitting up in his chair with PT present.  Cont OT per POC.      Anticipated Discharge Disposition (OT): home with home health

## 2024-03-15 NOTE — THERAPY TREATMENT NOTE
Patient Name: Deep Mendez  : 1965    MRN: [id number]                              Today's Date: 3/15/2024       Admit Date: 3/12/2024    Visit Dx:     ICD-10-CM ICD-9-CM   1. Perforated appendicitis  K35.32 540.0     Patient Active Problem List   Diagnosis    Acute appendicitis with perforation, localized peritonitis, abscess, and gangrene    Type 2 diabetes mellitus, with long-term current use of insulin    Essential hypertension    Rupture of appendix     Past Medical History:   Diagnosis Date    Diabetes mellitus     Hypertension     Impaired mobility      Past Surgical History:   Procedure Laterality Date    APPENDECTOMY N/A 3/13/2024    Procedure: APPENDECTOMY LAPAROSCOPIC;  Surgeon: Yazmin Zayas MD;  Location: [facility];  Service: General;  Laterality: N/A;    BELOW KNEE LEG AMPUTATION      EYE SURGERY        General Information       Row Name 03/15/24 1455          Physical Therapy Time and Intention    Document Type therapy note (daily note)  -     Mode of Treatment physical therapy  -RM       Row Name 03/15/24 1455          General Information    Patient Profile Reviewed yes  -RM     Existing Precautions/Restrictions fall;other (see comments)  -RM       Row Name 03/15/24 1455          Cognition    Orientation Status (Cognition) oriented x 4  -RM       Row Name 03/15/24 1455          Safety Issues, Functional Mobility    Safety Issues Affecting Function (Mobility) awareness of need for assistance;judgment;problem-solving;safety precaution awareness;safety precautions follow-through/compliance;positioning of assistive device  -     Impairments Affecting Function (Mobility) endurance/activity tolerance;strength;pain;postural/trunk control;motor planning  -RM               User Key  (r) = Recorded By, (t) = Taken By, (c) = Cosigned By      Initials Name Provider Type    RM Chai Mcclure, PTA Physical Therapist Assistant                   Mobility    No documentation.                   Obj/Interventions       Row Name 03/15/24 1458          Motor Skills    Therapeutic Exercise hip;knee;ankle  -RM       Row Name 03/15/24 1458          Hip (Therapeutic Exercise)    Hip (Therapeutic Exercise) AAROM (active assistive range of motion);isometric exercises  -RM     Hip AAROM (Therapeutic Exercise) bilateral;flexion;extension;aBduction;aDduction;10 repetitions;2 sets  -RM     Hip Isometrics (Therapeutic Exercise) bilateral;gluteal sets;10 repetitions;2 sets  -RM       Row Name 03/15/24 1458          Knee (Therapeutic Exercise)    Knee (Therapeutic Exercise) AAROM (active assistive range of motion)  -RM     Knee AAROM (Therapeutic Exercise) bilateral;flexion;extension;10 repetitions;2 sets  -RM       Row Name 03/15/24 1458          Ankle (Therapeutic Exercise)    Ankle (Therapeutic Exercise) AROM (active range of motion)  -RM     Ankle AROM (Therapeutic Exercise) right;dorsiflexion;plantarflexion;20 repititions  -RM               User Key  (r) = Recorded By, (t) = Taken By, (c) = Cosigned By      Initials Name Provider Type    RM Chai Mcclure, PTA Physical Therapist Assistant                   Goals/Plan    No documentation.                  Clinical Impression       [facility] Name 03/15/24 1505 03/15/24 1500       Pain    Pretreatment Pain Rating 0/10 - no pain  -RM 0/10 - no pain  -RM    Posttreatment Pain Rating 0/10 - no pain  -RM 0/10 - no pain  -RM      [facility] Name 03/15/24 1505 03/15/24 1500       Plan of Care Review    Plan of Care Reviewed With patient  -RM patient  -RM    Progress no change  -RM no change  -RM    Outcome Evaluation Pt sitting UIC and willing to perform seated B LE exercises.  See flowsheet for details. Cont PT per POC progressing to goals as pt tolerates.  -RM --      Row Name 03/15/24 1505          Positioning and Restraints    Pre-Treatment Position sitting in chair/recliner  -RM     Post Treatment Position chair  -RM     In Chair reclined;call light within reach;encouraged to call  for assist;exit alarm on;notified nsg  -RM               User Key  (r) = Recorded By, (t) = Taken By, (c) = Cosigned By      Initials Name Provider Type    Chai Fermin, TIGRE Physical Therapist Assistant                   Outcome Measures       Row Name 03/15/24 1508 03/15/24 0900       How much help from another person do you currently need...    Turning from your back to your side while in flat bed without using bedrails? 4  -RM 4  -HS    Moving from lying on back to sitting on the side of a flat bed without bedrails? 4  -RM 4  -HS    Moving to and from a bed to a chair (including a wheelchair)? 3  -RM 3  -HS    Standing up from a chair using your arms (e.g., wheelchair, bedside chair)? 3  -RM 3  -HS    Climbing 3-5 steps with a railing? 2  -RM 2  -HS    To walk in hospital room? 3  -RM 3  -HS    AM-PAC 6 Clicks Score (PT) 19  -RM 19  -HS    Highest Level of Mobility Goal 6 --> Walk 10 steps or more  -RM 6 --> Walk 10 steps or more  -HS      Row Name 03/15/24 1508          Functional Assessment    Outcome Measure Options AM-PAC 6 Clicks Basic Mobility (PT)  -               User Key  (r) = Recorded By, (t) = Taken By, (c) = Cosigned By      Initials Name Provider Type    Chai Fermin, TIGRE Physical Therapist Assistant     Dionne Collazo RN Registered Nurse                                 Physical Therapy Education       Title: PT OT SLP Therapies (In Progress)       Topic: Physical Therapy (In Progress)       Point: Mobility training (Done)       Learning Progress Summary             Patient Acceptance, E,TB, VU by  at 3/14/2024 1125    Comment: educated pt on importance of OOB mobility during IP admission                         Point: Home exercise program (Done)       Learning Progress Summary             Patient Acceptance, E,TB,D, VU,NR by  at 3/15/2024 1508    Comment: Exercise techniques                         Point: Body mechanics (Not Started)       Learner Progress:  Not  documented in this visit.              Point: Precautions (Not Started)       Learner Progress:  Not documented in this visit.                              User Key       Initials Effective Dates Name Provider Type Discipline     06/16/21 -  Chai Mcclure PTA Physical Therapist Assistant PT     11/29/23 -  Rachelle Sharp PT Physical Therapist PT                  PT Recommendation and Plan     Plan of Care Reviewed With: patient  Progress: no change  Outcome Evaluation: Pt sitting UIC and willing to perform seated B LE exercises.  See flowsheet for details. Cont PT per POC progressing to goals as pt tolerates.     Time Calculation:         PT Charges       Row Name 03/15/24 1509             Time Calculation    Start Time 1347  -RM      Stop Time 1403  -RM      Time Calculation (min) 16 min  -RM      PT Received On 03/15/24  -RM      PT Goal Re-Cert Due Date 03/24/24  -RM         Time Calculation- PT    Total Timed Code Minutes- PT 16 minute(s)  -RM         Timed Charges    05689 - PT Therapeutic Exercise Minutes 16  -RM         Total Minutes    Timed Charges Total Minutes 16  -RM       Total Minutes 16  -RM                User Key  (r) = Recorded By, (t) = Taken By, (c) = Cosigned By      Initials Name Provider Type     Chai Mcclure PTA Physical Therapist Assistant                  Therapy Charges for Today       Code Description Service Date Service Provider Modifiers Qty    92519159782 HC PT THER PROC EA 15 MIN 3/15/2024 Chai Mcclure PTA GP 1            PT G-Codes  Outcome Measure Options: AM-PAC 6 Clicks Basic Mobility (PT)  AM-PAC 6 Clicks Score (PT): 19  AM-PAC 6 Clicks Score (OT): 21       Chai Mcclure PTA  3/15/2024

## 2024-03-15 NOTE — PROGRESS NOTES
[facility]    Name:  Deep Mendez   Age:  58 y.o.  Sex:  male  :  1965  MRN:  [id number]   Visit Number:  [id number]  Admission Date:  3/12/2024  Date Of Service:  03/15/24  Primary Care Physician:  Sami Fisher MD     LOS: 1 day :    Chief Complaint:      Abdominal pain    Subjective:    Patient seen and examined with no family at bedside.  He just received pain medications and is drowsy.  States is passing flatus.  Is voiding without difficulty after he was noted to have severe urinary retention yesterday.  States his back is hurting from lying in bed.  Updated would be getting up today with therapy as well.  States pain otherwise controlled.    Hospital Course:    Deep Mendez is a 58-year old male with history of hypertension and diabetes mellitus type 2 on insulin and Trulicity was brought to the emergency room by his friend with right lower quadrant abdominal pain that started earlier in the day.  Patient stated that he was in his usual state of health, went to the bathroom had a bowel movement, subsequently developed right upper and lower quadrant pain that was sharp and radiating up into the chest.  He had some nausea but no vomiting.  No prior history of abdominal surgeries.  He is diabetic and is status post below knee amputation.  No fevers.     In the emergency room, he was afebrile and hemodynamically stable saturating at 97% on room air but his blood pressure was elevated at 164/88.  Initial troponin 45 with a repeat at 48.  CMP was unremarkable except for a sodium of 132 and a glucose of 286.  Lipase normal at 16.  CBC unremarkable except for a white count of 13.6.  CT of the abdomen and pelvis done in the emergency room showed findings consistent with perforated appendicitis with ill-defined developing periappendiceal abscess.  Nonobstructing punctate left renal stones were noted.  Patient's condition was discussed with Dr. Zayas from  surgical services who recommended antibiotics therapy and admission to the hospital.  Patient was given morphine, Zofran and IV Zosyn in the emergency room.  He also received a liter of normal saline bolus and was  admitted to the medical floor with telemetry for perforated appendicitis.  Otherwise reassuring labs and vitals noted.  A1c noted to be 9.3 with prior 12.  Patient taken for laparoscopic appendectomy per general surgeon Dr. Zayas on 3/13/2024 without any acute complications noted.  Patient was noted to have large abscess intraoperatively for which BRITTANIE drain was placed as well.  IV antibiotics continued.  Metoprolol XL added for persistent tachycardia.       Review of Systems:     All systems were reviewed and negative except as mentioned in subjective, assessment and plan.    Vital Signs:    Temp:  [96.3 °F (35.7 °C)-99.2 °F (37.3 °C)] 96.3 °F (35.7 °C)  Heart Rate:  [91-97] 96  Resp:  [14-24] 24  BP: ()/(54-62) 100/57    Intake and output:    I/O last 3 completed shifts:  In: 1340 [P.O.:1080; I.V.:260]  Out: 2775 [Urine:2500; Drains:275]  I/O this shift:  In: 240 [P.O.:240]  Out: 135 [Drains:135]    Physical Examination:    General Appearance:  Alert and cooperative.  Chronically ill middle-age male.  No acute distress.   Head:  Atraumatic and normocephalic.   Eyes: Conjunctivae and sclerae normal, no icterus. No pallor.   Throat: No oral lesions, no thrush, oral mucosa moist.   Neck: Supple, trachea midline, no thyromegaly.   Lungs:   Breath sounds heard bilaterally equally.  No wheezing or crackles. No Pleural rub or bronchial breathing.  On room air unlabored.   Heart:  Normal S1 and S2, no murmur, no gallop, no rub. No JVD.   Abdomen:   Normal bowel sounds, no masses, no organomegaly. Soft, nontender, nondistended, no rebound tenderness.  Slight discomfort generalized.  BRITTANIE drain noted to left abdomen with serosanguineous drainage.  Lap sites noted.   Extremities: Supple, right lower extremity  "edema, no cyanosis, no clubbing.  Left prosthetic leg noted below the knee amputation   Skin: No bleeding or rash.  Right lower extremity chronic venous stasis noted.   Neurologic: Alert and oriented x 3. No facial asymmetry. Moves all four limbs. No tremors.      Laboratory results:    Results from last 7 days   Lab Units 03/15/24  0617 03/14/24  0707 03/13/24  0553 03/12/24  2040   SODIUM mmol/L 131* 133* 134* 132*   POTASSIUM mmol/L 4.1 4.9 4.1 4.2   CHLORIDE mmol/L 95* 101 100 95*   CO2 mmol/L 21.2* 22.9 22.8 24.8   BUN mg/dL 17 13 13 13   CREATININE mg/dL 1.32* 1.24 1.30* 1.27   CALCIUM mg/dL 8.1* 8.7 8.4* 9.4   BILIRUBIN mg/dL  --   --   --  0.6   ALK PHOS U/L  --   --   --  97   ALT (SGPT) U/L  --   --   --  17   AST (SGOT) U/L  --   --   --  15   GLUCOSE mg/dL 204* 228* 143* 286*     Results from last 7 days   Lab Units 03/15/24  0617 03/14/24  0707 03/13/24  0553   WBC 10*3/mm3 15.26* 14.64* 12.39*   HEMOGLOBIN g/dL 12.8* 13.8 12.4*   HEMATOCRIT % 38.8 40.8 36.8*   PLATELETS 10*3/mm3 269 251 225         Results from last 7 days   Lab Units 03/12/24  2242 03/12/24  2040   HSTROP T ng/L 48* 45*     Results from last 7 days   Lab Units 03/13/24  0112 03/13/24  0109   BLOODCX  No growth at 2 days No growth at 2 days     No results for input(s): \"PHART\", \"EAP2XLZ\", \"PO2ART\", \"QOX5OME\", \"BASEEXCESS\" in the last 8760 hours.   I have reviewed the patient's laboratory results.    Radiology results:    No radiology results from the last 24 hrs  I have reviewed the patient's radiology reports.    Medication Review:     I have reviewed the patient's active and prn medications.     Problem List:      Acute appendicitis with perforation, localized peritonitis, abscess, and gangrene    Type 2 diabetes mellitus, with long-term current use of insulin    Essential hypertension    Rupture of appendix      Assessment    Acute perforated appendicitis with abscess status post laparoscopic appendectomy, POA.  Diabetes mellitus " type 2 with hyperglycemia, POA.  Essential hypertension.  Obesity with a BMI of 37.  Status post left BKA with prosthesis      Plan:    Acute perforated appendicitis.  -Status post laparoscopic appendectomy 3/13/2024  - Patient will be continued on IV antibiotic therapy with Zosyn for several days.  - Morphine/Norco and Zofran for pain control and nausea.  Continue bowel regimen.  - Dr. Zayas following.  Appreciate recommendations.  - Diet advanced to GI/low irritant.  -Trend leukocytosis  -PT/OT/case management consulted.     Diabetes mellitus type 2.  - Continue Levemir with up titration due to persistent hyperglycemia.  - Subcutaneous insulin protocol for coverage.  - A1c 9.3.  -Diabetic educator consulted.    Hypertension  -Does not currently take any home medications for this.  -Cozaar and Toprol-XL added as patient tachycardic as well.  Slight hypotension noted.  Cozaar held for now.  Patient also likely needs daily diuretic for right lower extremity chronic edema.    -Patient will likely discharge home with home health.  However, he will require several further days of IV antibiotic therapy.    I have reviewed the copied text and it is accurate as of 3/15/2024      DVT Prophylaxis: Enoxaparin  Code Status: Full  Diet: Clears  Discharge Plan: Several days.    Janel Lorenzo, APRN  03/15/24  12:21 EDT    Dictated utilizing Dragon dictation.

## 2024-03-15 NOTE — PROGRESS NOTES
LOS: 1 day   Patient Care Team:  Sami Fisher MD as PCP - General (Internal Medicine)  Provider, No Known as PCP - Family Medicine    Chief Complaint:  POD#2    Subjective     Interval History:     No acute events reported overnight.  Patient reports pain control acceptable.  Denies nausea/vomiting. Passing more flatus. Tolerated full liquids for breakfast.     Objective     Vital Signs  Temp:  [97.7 °F (36.5 °C)-99.2 °F (37.3 °C)] 98 °F (36.7 °C)  Heart Rate:  [91-97] 92  Resp:  [12-18] 12  BP: ()/(54-66) 115/54    UOP 2500mL  BRITTANIE 175mL    Physical Exam:     General Appearance:    Alert, cooperative, in no acute distress   Head:    Normocephalic, without obvious abnormality, atraumatic   Eyes:            Lids and lashes normal, conjunctivae and sclerae normal, no icterus   Ears:    Ears appear intact with no abnormalities noted   Lungs:     Respirations regular, even and unlabored    Heart:    Regular rhythm and normal rate   Abdomen:     Soft, non-tender, non-distended, incision c/d/I.  BRITTANIE serosanguinous   Skin:   No bleeding, bruising or rash   Neurologic:   AAOx3, no gross deficits          Results Review:    I reviewed the patient's new clinical results.  Results from last 7 days   Lab Units 03/15/24  0617 03/14/24  0707 03/13/24  0553 03/12/24  2040   SODIUM mmol/L 131* 133* 134* 132*   POTASSIUM mmol/L 4.1 4.9 4.1 4.2   CHLORIDE mmol/L 95* 101 100 95*   CO2 mmol/L 21.2* 22.9 22.8 24.8   BUN mg/dL 17 13 13 13   CREATININE mg/dL 1.32* 1.24 1.30* 1.27   CALCIUM mg/dL 8.1* 8.7 8.4* 9.4   BILIRUBIN mg/dL  --   --   --  0.6   ALK PHOS U/L  --   --   --  97   ALT (SGPT) U/L  --   --   --  17   AST (SGOT) U/L  --   --   --  15   GLUCOSE mg/dL 204* 228* 143* 286*       Results from last 7 days   Lab Units 03/15/24  0617 03/14/24  0707 03/13/24  0553   WBC 10*3/mm3 15.26* 14.64* 12.39*   HEMOGLOBIN g/dL 12.8* 13.8 12.4*   HEMATOCRIT % 38.8 40.8 36.8*   PLATELETS 10*3/mm3 269 251 225         Medication  Review:   Scheduled Meds:atorvastatin, 40 mg, Oral, Daily  enoxaparin, 40 mg, Subcutaneous, Q24H  furosemide, 40 mg, Oral, Daily  Insulin Aspart, 2-9 Units, Subcutaneous, 4x Daily AC & at Bedtime  insulin detemir, 15 Units, Subcutaneous, Daily  [Held by provider] losartan, 25 mg, Oral, Q24H  metoprolol succinate XL, 25 mg, Oral, Q24H  piperacillin-tazobactam, 4.5 g, Intravenous, Q8H  sodium chloride, 10 mL, Intravenous, Q12H      Continuous Infusions:Pharmacy to Dose enoxaparin (LOVENOX),   Pharmacy to Dose Zosyn,   [Held by provider] sodium chloride, 100 mL/hr, Last Rate: Stopped (03/14/24 1608)      PRN Meds:.  acetaminophen **OR** acetaminophen **OR** acetaminophen    senna-docusate sodium **AND** polyethylene glycol **AND** bisacodyl **AND** bisacodyl    dextrose    dextrose    glucagon (human recombinant)    HYDROcodone-acetaminophen    Morphine **AND** naloxone    ondansetron    Pharmacy to Dose enoxaparin (LOVENOX)    Pharmacy to Dose Zosyn    sodium chloride    sodium chloride    sodium chloride      Assessment & Plan       Acute appendicitis with perforation, localized peritonitis, abscess, and gangrene    Type 2 diabetes mellitus, with long-term current use of insulin    Essential hypertension    Rupture of appendix    Mr. Mendez is POD#2 from a laparoscopic appendectomy with drainage of a large intraabdominal abscess and drain placement. He continue to do well thus far. He is anxious to have his diet further advanced.  I have agreed to a GI soft diet but encouraged him to be cautious regarding over indulgence.  I emphasized to him the need to be out of bed as much as possible.  We discussed aggressive pulmonary toileting. Continue IV antibiotics. He had no additional questions at the end of the visit.      Yazmin Zayas MD  03/15/24  09:17 EDT

## 2024-03-15 NOTE — CASE MANAGEMENT/SOCIAL WORK
Case Management/Social Work    Patient Name:  Deep Mendez  YOB: 1965  MRN: [id number]  Admit Date:  3/12/2024        10:28 EDT  Met with patient at bedside. Plan to DC home with home health once medically ready. Patient has used [facility] home health in the past. CM will continue to follow.      Electronically signed by:  Sadiq Beebe RN  03/15/24 10:28 EDT

## 2024-03-15 NOTE — PLAN OF CARE
Goal Outcome Evaluation:  Plan of Care Reviewed With: patient           Outcome Evaluation: No acute events this shift. Vital Signs Stable. Patient up to chair most of the evening. Tolerating PO intake. Will continue to monitor.

## 2024-03-15 NOTE — PLAN OF CARE
Goal Outcome Evaluation:  Plan of Care Reviewed With: patient        Progress: no change  Outcome Evaluation: VSS, temp max 99.2, pain controlled with PRN med

## 2024-03-15 NOTE — THERAPY TREATMENT NOTE
Patient Name: Deep Mendez  : 1965    MRN: [id number]                              Today's Date: 3/15/2024       Admit Date: 3/12/2024    Visit Dx:     ICD-10-CM ICD-9-CM   1. Perforated appendicitis  K35.32 540.0     Patient Active Problem List   Diagnosis    Acute appendicitis with perforation, localized peritonitis, abscess, and gangrene    Type 2 diabetes mellitus, with long-term current use of insulin    Essential hypertension    Rupture of appendix     Past Medical History:   Diagnosis Date    Diabetes mellitus     Hypertension     Impaired mobility      Past Surgical History:   Procedure Laterality Date    APPENDECTOMY N/A 3/13/2024    Procedure: APPENDECTOMY LAPAROSCOPIC;  Surgeon: Yazmin Zayas MD;  Location: [facility];  Service: General;  Laterality: N/A;    BELOW KNEE LEG AMPUTATION      EYE SURGERY        General Information       Row Name 03/15/24 1509          OT Time and Intention    Document Type therapy note (daily note)  -     Mode of Treatment occupational therapy  -       Row Name 03/15/24 1509          General Information    Patient Profile Reviewed yes  -     Existing Precautions/Restrictions fall  -               User Key  (r) = Recorded By, (t) = Taken By, (c) = Cosigned By      Initials Name Provider Type     Yvette Cabral Occupational Therapist                     Mobility/ADL's       Row Name 03/15/24 1501          Bed Mobility    Comment, (Bed Mobility) pt up in the chair upon arrival  -[facility] Name 03/15/24 1500          Transfers    Comment, (Transfers) pt declined getting out of the chair with OT, states he is comfortable sitting in the chair  -       Row Name 03/15/24 1505          Activities of Daily Living    BADL Assessment/Intervention grooming  -[facility] Name 03/15/24 1508          Grooming Assessment/Training    [place] Level (Grooming) wash face, hands;oral care regimen;set up  -               User Key  (r) = Recorded By, (t) = Taken  By, (c) = Cosigned By      Initials Name Provider Type    Yvette Kennedy Occupational Therapist                   Obj/Interventions       [facility] Name 03/15/24 1510          Shoulder (Therapeutic Exercise)    Shoulder (Therapeutic Exercise) strengthening exercise  -     Shoulder Strengthening (Therapeutic Exercise) bilateral;horizontal aBduction/aDduction;flexion;extension;resistance band;red;10 repetitions  -[facility] Name 03/15/24 1510          Elbow/Forearm (Therapeutic Exercise)    Elbow/Forearm (Therapeutic Exercise) strengthening exercise  -     Elbow/Forearm Strengthening (Therapeutic Exercise) bilateral;flexion;extension;resistance band;red;10 repetitions  -[facility] Name 03/15/24 1510          Motor Skills    Therapeutic Exercise shoulder;elbow/forearm  -               User Key  (r) = Recorded By, (t) = Taken By, (c) = Cosigned By      Initials Name Provider Type    Yvette Kennedy Occupational Therapist                   Goals/Plan    No documentation.                  Clinical Impression       [facility] Name 03/15/24 1511          Pain Assessment    Pretreatment Pain Rating 0/10 - no pain  -     Posttreatment Pain Rating 0/10 - no pain  -     Pain Intervention(s) Repositioned  -AH       Row Name 03/15/24 1511          Plan of Care Review    Plan of Care Reviewed With patient  -     Progress improving  -     Outcome Evaluation Pt received sitting up in his chair, willing to do UB ex with OT.  Pt completed ther ex using red theraband for resistance.  See flowsheet for details.  Pt completed grooming tasks with set up of necessary items.  Pt declined getting out of the chair this afternoon, states he is comfortable.  Pt left sitting up in his chair with PT present.  Cont OT per POC.  -[facility] Name 03/15/24 1511          Positioning and Restraints    Pre-Treatment Position sitting in chair/recliner  [facility]     Post Treatment Position chair  -     In Chair call light within reach;encouraged  to call for assist;sitting;exit alarm on  -               User Key  (r) = Recorded By, (t) = Taken By, (c) = Cosigned By      Initials Name Provider Type    Yvette Kennedy Occupational Therapist                   Outcome Measures       Row Name 03/15/24 1513          How much help from another is currently needed...    Putting on and taking off regular lower body clothing? 3  -AH     Bathing (including washing, rinsing, and drying) 3  -AH     Toileting (which includes using toilet bed pan or urinal) 3  -AH     Putting on and taking off regular upper body clothing 4  -AH     Taking care of personal grooming (such as brushing teeth) 4  -AH     Eating meals 4  -AH     AM-PAC 6 Clicks Score (OT) 21  -AH       Row Name 03/15/24 1508 03/15/24 0900       How much help from another person do you currently need...    Turning from your back to your side while in flat bed without using bedrails? 4  -RM 4  -HS    Moving from lying on back to sitting on the side of a flat bed without bedrails? 4  -RM 4  -HS    Moving to and from a bed to a chair (including a wheelchair)? 3  -RM 3  -HS    Standing up from a chair using your arms (e.g., wheelchair, bedside chair)? 3  -RM 3  -HS    Climbing 3-5 steps with a railing? 2  -RM 2  -HS    To walk in hospital room? 3  -RM 3  -HS    AM-PAC 6 Clicks Score (PT) 19  -RM 19  -HS    Highest Level of Mobility Goal 6 --> Walk 10 steps or more  -RM 6 --> Walk 10 steps or more  -HS      Row Name 03/15/24 1513 03/15/24 1508       Functional Assessment    Outcome Measure Options AM-PAC 6 Clicks Daily Activity (OT)  - AM-PAC 6 Clicks Basic Mobility (PT)  -RM              User Key  (r) = Recorded By, (t) = Taken By, (c) = Cosigned By      Initials Name Provider Type    Yvette Kennedy Occupational Therapist    Chai Fermin, PTA Physical Therapist Assistant    Dionne Santo, RN Registered Nurse                    Occupational Therapy Education       Title: PT OT SLP Therapies (In  Progress)       Topic: Occupational Therapy (In Progress)       Point: ADL training (Done)       Description:   Instruct learner(s) on proper safety adaptation and remediation techniques during self care or transfers.   Instruct in proper use of assistive devices.                  Learning Progress Summary             Patient Acceptance, E,TB, VU by  at 3/15/2024 1513    Comment: benefit of working with therapy    Acceptance, E,TB, VU by  at 3/14/2024 1244    Comment: Role of OT/POC                         Point: Home exercise program (Done)       Description:   Instruct learner(s) on appropriate technique for monitoring, assisting and/or progressing therapeutic exercises/activities.                  Learning Progress Summary             Patient Acceptance, E,TB, VU by  at 3/15/2024 1513    Comment: benefit of working with therapy                         Point: Precautions (Not Started)       Description:   Instruct learner(s) on prescribed precautions during self-care and functional transfers.                  Learner Progress:  Not documented in this visit.              Point: Body mechanics (Not Started)       Description:   Instruct learner(s) on proper positioning and spine alignment during self-care, functional mobility activities and/or exercises.                  Learner Progress:  Not documented in this visit.                              User Key       Initials Effective Dates Name Provider Type Discipline     06/16/21 -  Yvette Cabral Occupational Therapist OT                  OT Recommendation and Plan  Planned Therapy Interventions (OT): activity tolerance training, BADL retraining, patient/caregiver education/training, transfer/mobility retraining, strengthening exercise  Therapy Frequency (OT): 3 times/wk  Plan of Care Review  Plan of Care Reviewed With: patient  Progress: improving  Outcome Evaluation: Pt received sitting up in his chair, willing to do UB ex with OT.  Pt completed ther ex  using red theraband for resistance.  See flowsheet for details.  Pt completed grooming tasks with set up of necessary items.  Pt declined getting out of the chair this afternoon, states he is comfortable.  Pt left sitting up in his chair with PT present.  Cont OT per POC.     Time Calculation:   Evaluation Complexity (OT)  Review Occupational Profile/Medical/Therapy History Complexity: expanded/moderate complexity  Assessment, Occupational Performance/Identification of Deficit Complexity: 3-5 performance deficits  Clinical Decision Making Complexity (OT): detailed assessment/moderate complexity  Overall Complexity of Evaluation (OT): moderate complexity     Time Calculation- OT       Row Name 03/15/24 1514             Time Calculation- OT    OT Start Time 1333  -      OT Stop Time 1349  -      OT Time Calculation (min) 16 min  -      OT Received On 03/15/24  -      OT Goal Re-Cert Due Date 03/24/24  -         Timed Charges    59150 - OT Therapeutic Exercise Minutes 10  -AH      30485 - OT Self Care/Mgmt Minutes 6  -AH         Total Minutes    Timed Charges Total Minutes 16  -AH       Total Minutes 16  -AH                User Key  (r) = Recorded By, (t) = Taken By, (c) = Cosigned By      Initials Name Provider Type     Yvette Cabral Occupational Therapist                  Therapy Charges for Today       Code Description Service Date Service Provider Modifiers Qty    [id number]  OT EVAL MOD COMPLEXITY 4 3/14/2024 Yvette Cabral 1    [id number]  OT THER PROC EA 15 MIN 3/15/2024 Yvette Cabral 1                 Yvette Cabral  3/15/2024

## 2024-03-16 PROBLEM — D72.829 LEUKOCYTOSIS: Status: ACTIVE | Noted: 2024-03-16

## 2024-03-16 PROBLEM — N17.9 AKI (ACUTE KIDNEY INJURY): Status: ACTIVE | Noted: 2024-03-16

## 2024-03-16 PROBLEM — E87.1 HYPONATREMIA: Status: ACTIVE | Noted: 2024-03-16

## 2024-03-16 LAB
ANION GAP SERPL CALCULATED.3IONS-SCNC: 11.2 MMOL/L (ref 5–15)
BASOPHILS # BLD AUTO: 0.04 10*3/MM3 (ref 0–0.2)
BASOPHILS NFR BLD AUTO: 0.3 % (ref 0–1.5)
BUN SERPL-MCNC: 23 MG/DL (ref 6–20)
BUN/CREAT SERPL: 13.5 (ref 7–25)
CALCIUM SPEC-SCNC: 8.1 MG/DL (ref 8.6–10.5)
CHLORIDE SERPL-SCNC: 96 MMOL/L (ref 98–107)
CO2 SERPL-SCNC: 22.8 MMOL/L (ref 22–29)
CREAT SERPL-MCNC: 1.71 MG/DL (ref 0.76–1.27)
DEPRECATED RDW RBC AUTO: 41.4 FL (ref 37–54)
EGFRCR SERPLBLD CKD-EPI 2021: 46.1 ML/MIN/1.73
EOSINOPHIL # BLD AUTO: 0.23 10*3/MM3 (ref 0–0.4)
EOSINOPHIL NFR BLD AUTO: 1.5 % (ref 0.3–6.2)
ERYTHROCYTE [DISTWIDTH] IN BLOOD BY AUTOMATED COUNT: 13.2 % (ref 12.3–15.4)
GLUCOSE BLDC GLUCOMTR-MCNC: 238 MG/DL (ref 70–130)
GLUCOSE BLDC GLUCOMTR-MCNC: 278 MG/DL (ref 70–130)
GLUCOSE BLDC GLUCOMTR-MCNC: 301 MG/DL (ref 70–130)
GLUCOSE BLDC GLUCOMTR-MCNC: 370 MG/DL (ref 70–130)
GLUCOSE SERPL-MCNC: 227 MG/DL (ref 65–99)
HCT VFR BLD AUTO: 36.8 % (ref 37.5–51)
HGB BLD-MCNC: 12.3 G/DL (ref 13–17.7)
IMM GRANULOCYTES # BLD AUTO: 0.2 10*3/MM3 (ref 0–0.05)
IMM GRANULOCYTES NFR BLD AUTO: 1.3 % (ref 0–0.5)
LYMPHOCYTES # BLD AUTO: 2.08 10*3/MM3 (ref 0.7–3.1)
LYMPHOCYTES NFR BLD AUTO: 13.6 % (ref 19.6–45.3)
MCH RBC QN AUTO: 28.9 PG (ref 26.6–33)
MCHC RBC AUTO-ENTMCNC: 33.4 G/DL (ref 31.5–35.7)
MCV RBC AUTO: 86.4 FL (ref 79–97)
MONOCYTES # BLD AUTO: 1.07 10*3/MM3 (ref 0.1–0.9)
MONOCYTES NFR BLD AUTO: 7 % (ref 5–12)
NEUTROPHILS NFR BLD AUTO: 11.66 10*3/MM3 (ref 1.7–7)
NEUTROPHILS NFR BLD AUTO: 76.3 % (ref 42.7–76)
NRBC BLD AUTO-RTO: 0 /100 WBC (ref 0–0.2)
OSMOLALITY UR: 473 MOSM/KG
PLATELET # BLD AUTO: 268 10*3/MM3 (ref 140–450)
PMV BLD AUTO: 9.7 FL (ref 6–12)
POTASSIUM SERPL-SCNC: 3.9 MMOL/L (ref 3.5–5.2)
RBC # BLD AUTO: 4.26 10*6/MM3 (ref 4.14–5.8)
SODIUM SERPL-SCNC: 130 MMOL/L (ref 136–145)
SODIUM UR-SCNC: 42 MMOL/L
TSH SERPL DL<=0.05 MIU/L-ACNC: 6.48 UIU/ML (ref 0.27–4.2)
WBC NRBC COR # BLD AUTO: 15.28 10*3/MM3 (ref 3.4–10.8)

## 2024-03-16 PROCEDURE — 80048 BASIC METABOLIC PNL TOTAL CA: CPT | Performed by: NURSE PRACTITIONER

## 2024-03-16 PROCEDURE — 25010000002 ENOXAPARIN PER 10 MG: Performed by: SURGERY

## 2024-03-16 PROCEDURE — 63710000001 INSULIN ASPART PER 5 UNITS: Performed by: INTERNAL MEDICINE

## 2024-03-16 PROCEDURE — 99233 SBSQ HOSP IP/OBS HIGH 50: CPT | Performed by: NURSE PRACTITIONER

## 2024-03-16 PROCEDURE — 25810000003 SODIUM CHLORIDE 0.9 % SOLUTION: Performed by: NURSE PRACTITIONER

## 2024-03-16 PROCEDURE — 99024 POSTOP FOLLOW-UP VISIT: CPT | Performed by: SURGERY

## 2024-03-16 PROCEDURE — 25010000002 MORPHINE PER 10 MG: Performed by: SURGERY

## 2024-03-16 PROCEDURE — 97110 THERAPEUTIC EXERCISES: CPT

## 2024-03-16 PROCEDURE — 63710000001 INSULIN ASPART PER 5 UNITS: Performed by: NURSE PRACTITIONER

## 2024-03-16 PROCEDURE — 25010000002 PIPERACILLIN SOD-TAZOBACTAM PER 1 G: Performed by: SURGERY

## 2024-03-16 PROCEDURE — 82948 REAGENT STRIP/BLOOD GLUCOSE: CPT

## 2024-03-16 PROCEDURE — 63710000001 INSULIN DETEMIR PER 5 UNITS: Performed by: NURSE PRACTITIONER

## 2024-03-16 PROCEDURE — 85025 COMPLETE CBC W/AUTO DIFF WBC: CPT | Performed by: NURSE PRACTITIONER

## 2024-03-16 PROCEDURE — 82948 REAGENT STRIP/BLOOD GLUCOSE: CPT | Performed by: SURGERY

## 2024-03-16 PROCEDURE — 97530 THERAPEUTIC ACTIVITIES: CPT

## 2024-03-16 PROCEDURE — 84443 ASSAY THYROID STIM HORMONE: CPT | Performed by: NURSE PRACTITIONER

## 2024-03-16 PROCEDURE — 84300 ASSAY OF URINE SODIUM: CPT | Performed by: NURSE PRACTITIONER

## 2024-03-16 PROCEDURE — 83935 ASSAY OF URINE OSMOLALITY: CPT | Performed by: NURSE PRACTITIONER

## 2024-03-16 RX ORDER — SODIUM CHLORIDE 9 MG/ML
75 INJECTION, SOLUTION INTRAVENOUS CONTINUOUS
Status: DISCONTINUED | OUTPATIENT
Start: 2024-03-16 | End: 2024-03-17

## 2024-03-16 RX ORDER — INSULIN ASPART 100 [IU]/ML
4 INJECTION, SOLUTION INTRAVENOUS; SUBCUTANEOUS
Status: DISCONTINUED | OUTPATIENT
Start: 2024-03-16 | End: 2024-03-18 | Stop reason: HOSPADM

## 2024-03-16 RX ORDER — TAMSULOSIN HYDROCHLORIDE 0.4 MG/1
0.4 CAPSULE ORAL DAILY
Status: DISCONTINUED | OUTPATIENT
Start: 2024-03-16 | End: 2024-03-18 | Stop reason: HOSPADM

## 2024-03-16 RX ADMIN — INSULIN ASPART 8 UNITS: 100 INJECTION, SOLUTION INTRAVENOUS; SUBCUTANEOUS at 20:30

## 2024-03-16 RX ADMIN — INSULIN ASPART 4 UNITS: 100 INJECTION, SOLUTION INTRAVENOUS; SUBCUTANEOUS at 12:31

## 2024-03-16 RX ADMIN — PIPERACILLIN SODIUM AND TAZOBACTAM SODIUM 4.5 G: 4; .5 INJECTION, SOLUTION INTRAVENOUS at 10:41

## 2024-03-16 RX ADMIN — ATORVASTATIN CALCIUM 40 MG: 40 TABLET, FILM COATED ORAL at 08:24

## 2024-03-16 RX ADMIN — TAMSULOSIN HYDROCHLORIDE 0.4 MG: 0.4 CAPSULE ORAL at 10:41

## 2024-03-16 RX ADMIN — HYDROCODONE BITARTRATE AND ACETAMINOPHEN 1 TABLET: 7.5; 325 TABLET ORAL at 18:05

## 2024-03-16 RX ADMIN — INSULIN ASPART 7 UNITS: 100 INJECTION, SOLUTION INTRAVENOUS; SUBCUTANEOUS at 17:57

## 2024-03-16 RX ADMIN — STANDARDIZED SENNA CONCENTRATE AND DOCUSATE SODIUM 2 TABLET: 8.6; 5 TABLET, FILM COATED ORAL at 08:24

## 2024-03-16 RX ADMIN — INSULIN DETEMIR 15 UNITS: 100 INJECTION, SOLUTION SUBCUTANEOUS at 08:23

## 2024-03-16 RX ADMIN — HYDROCODONE BITARTRATE AND ACETAMINOPHEN 1 TABLET: 7.5; 325 TABLET ORAL at 01:27

## 2024-03-16 RX ADMIN — INSULIN ASPART 4 UNITS: 100 INJECTION, SOLUTION INTRAVENOUS; SUBCUTANEOUS at 17:56

## 2024-03-16 RX ADMIN — INSULIN ASPART 4 UNITS: 100 INJECTION, SOLUTION INTRAVENOUS; SUBCUTANEOUS at 08:23

## 2024-03-16 RX ADMIN — PIPERACILLIN SODIUM AND TAZOBACTAM SODIUM 4.5 G: 4; .5 INJECTION, SOLUTION INTRAVENOUS at 17:56

## 2024-03-16 RX ADMIN — PIPERACILLIN SODIUM AND TAZOBACTAM SODIUM 4.5 G: 4; .5 INJECTION, SOLUTION INTRAVENOUS at 01:30

## 2024-03-16 RX ADMIN — MORPHINE SULFATE 2 MG: 2 INJECTION, SOLUTION INTRAMUSCULAR; INTRAVENOUS at 04:09

## 2024-03-16 RX ADMIN — Medication 10 ML: at 08:24

## 2024-03-16 RX ADMIN — METOPROLOL SUCCINATE 25 MG: 25 TABLET, EXTENDED RELEASE ORAL at 08:24

## 2024-03-16 RX ADMIN — ENOXAPARIN SODIUM 40 MG: 40 INJECTION SUBCUTANEOUS at 06:27

## 2024-03-16 RX ADMIN — SODIUM CHLORIDE 75 ML/HR: 9 INJECTION, SOLUTION INTRAVENOUS at 10:41

## 2024-03-16 RX ADMIN — INSULIN ASPART 6 UNITS: 100 INJECTION, SOLUTION INTRAVENOUS; SUBCUTANEOUS at 12:30

## 2024-03-16 RX ADMIN — POLYETHYLENE GLYCOL 3350 17 G: 17 POWDER, FOR SOLUTION ORAL at 08:24

## 2024-03-16 NOTE — PLAN OF CARE
Goal Outcome Evaluation:  Plan of Care Reviewed With: patient        Progress: no change  Outcome Evaluation: No acute events this shift.  Vital Signs Stable. Ledezma catheter placed due to urinary retention. Fluids started. Will continue to monitor.

## 2024-03-16 NOTE — PLAN OF CARE
Goal Outcome Evaluation:  Plan of Care Reviewed With: patient        Progress: no change  Outcome Evaluation: VSS, c/o back pain controlled with PRN meds and sitting up in chair, pt unable to void this shift after several attempts, bladder scan 809, I/O cath performed with 800ml results. Passing gas, but no BM, encouraged IS use.

## 2024-03-16 NOTE — PLAN OF CARE
Goal Outcome Evaluation:  Plan of Care Reviewed With: patient        Progress: improving  Outcome Evaluation: Pt agreeable to physical therapy. Performed sit <->stand withCGA with VC for hand placement, static standing with S x3 mins without LOB however pt reported feeling fatigued. Performed B LE ex in sitting LAQ, hip abd, marching and reclined QS and glute sets 1x10 reps and R Ap 1x10 reps. Pt with L BKA with prothesis donned. Con't with PT POC and progress as tolerated

## 2024-03-16 NOTE — PROGRESS NOTES
[facility]    Name:  Deep Mendez   Age:  58 y.o.  Sex:  male  :  1965  MRN:  [id number]   Visit Number:  [id number]  Admission Date:  3/12/2024  Date Of Service:  24  Primary Care Physician:  Sami Fisher MD     LOS: 2 days :    Chief Complaint:      Abdominal pain    Subjective:  Nursing reports continued urinary retention. 800ml bladder scan at 0500 this morning, required I&O cath, 475 currently, still no BM  He reports no abdominal pain, reports low back pain that he attributes to the bed    Hospital Course:    Deep Mendez is a 58-year old male with history of hypertension and diabetes mellitus type 2 on insulin and Trulicity was brought to the emergency room by his friend with right lower quadrant abdominal pain that started earlier in the day.  Patient stated that he was in his usual state of health, went to the bathroom had a bowel movement, subsequently developed right upper and lower quadrant pain that was sharp and radiating up into the chest.  He had some nausea but no vomiting.  No prior history of abdominal surgeries.  He is diabetic and is status post below knee amputation.  No fevers.     In the emergency room, he was afebrile and hemodynamically stable saturating at 97% on room air but his blood pressure was elevated at 164/88.  Initial troponin 45 with a repeat at 48.  CMP was unremarkable except for a sodium of 132 and a glucose of 286.  Lipase normal at 16.  CBC unremarkable except for a white count of 13.6.  CT of the abdomen and pelvis done in the emergency room showed findings consistent with perforated appendicitis with ill-defined developing periappendiceal abscess.  Nonobstructing punctate left renal stones were noted.  Patient's condition was discussed with Dr. Zayas from surgical services who recommended antibiotics therapy and admission to the hospital.  Patient was given morphine, Zofran and IV Zosyn in the emergency  room.  He also received a liter of normal saline bolus and was  admitted to the medical floor with telemetry for perforated appendicitis.  Otherwise reassuring labs and vitals noted.  A1c noted to be 9.3 with prior 12.  Patient taken for laparoscopic appendectomy per general surgeon Dr. Zayas on 3/13/2024 without any acute complications noted.  Patient was noted to have large abscess intraoperatively for which BRITTANIE drain was placed as well.  IV antibiotics continued.  Metoprolol XL added for persistent tachycardia. Urinary retention with hernández insertion and addition of flomax.        Review of Systems:     All systems were reviewed and negative except as mentioned in subjective, assessment and plan.    Vital Signs:    Temp:  [96.3 °F (35.7 °C)-98.5 °F (36.9 °C)] 97.5 °F (36.4 °C)  Heart Rate:  [81-94] 81  Resp:  [16-24] 16  BP: ()/(48-73) 120/61    Intake and output:    I/O last 3 completed shifts:  In: 2499 [P.O.:960; I.V.:739; IV Piggyback:800]  Out: 3185 [Urine:2900; Drains:285]  I/O this shift:  In: 240 [P.O.:240]  Out: 500 [Urine:500]    Physical Examination:  Constitutional: No acute distress, awake, alert, sitting up in chair  HENT: NCAT, mucous membranes moist  Respiratory: grossly clear, respiratory effort normal   Cardiovascular: RRR, no murmurs, rubs, or gallops  Gastrointestinal: Positive bowel sounds, midline incision with steri strips, C/D/I. LLQ BRITTANIE drain noted  Psychiatric: Appropriate affect, cooperative  Neurologic: Oriented x 3, strength symmetric in all extremities, Cranial Nerves grossly intact to confrontation, speech clear  Skin: No rashes      Laboratory results:    Results from last 7 days   Lab Units 03/16/24  0527 03/15/24  0617 03/14/24  0707 03/13/24  0553 03/12/24  2040   SODIUM mmol/L 130* 131* 133*   < > 132*   POTASSIUM mmol/L 3.9 4.1 4.9   < > 4.2   CHLORIDE mmol/L 96* 95* 101   < > 95*   CO2 mmol/L 22.8 21.2* 22.9   < > 24.8   BUN mg/dL 23* 17 13   < > 13   CREATININE mg/dL  "1.71* 1.32* 1.24   < > 1.27   CALCIUM mg/dL 8.1* 8.1* 8.7   < > 9.4   BILIRUBIN mg/dL  --   --   --   --  0.6   ALK PHOS U/L  --   --   --   --  97   ALT (SGPT) U/L  --   --   --   --  17   AST (SGOT) U/L  --   --   --   --  15   GLUCOSE mg/dL 227* 204* 228*   < > 286*    < > = values in this interval not displayed.     Results from last 7 days   Lab Units 03/16/24  0527 03/15/24  0617 03/14/24  0707   WBC 10*3/mm3 15.28* 15.26* 14.64*   HEMOGLOBIN g/dL 12.3* 12.8* 13.8   HEMATOCRIT % 36.8* 38.8 40.8   PLATELETS 10*3/mm3 268 269 251         Results from last 7 days   Lab Units 03/12/24  2242 03/12/24  2040   HSTROP T ng/L 48* 45*     Results from last 7 days   Lab Units 03/13/24  0112 03/13/24  0109   BLOODCX  No growth at 3 days No growth at 3 days     No results for input(s): \"PHART\", \"JGW2MXN\", \"PO2ART\", \"GJQ3SEU\", \"BASEEXCESS\" in the last 8760 hours.   I have reviewed the patient's laboratory results.    Radiology results:    No radiology results from the last 24 hrs  I have reviewed the patient's radiology reports.    Medication Review:     I have reviewed the patient's active and prn medications.     Problem List:      Acute appendicitis with perforation, localized peritonitis, abscess, and gangrene    Type 2 diabetes mellitus, with long-term current use of insulin    Essential hypertension    Rupture of appendix    TYRELL (acute kidney injury)    Leukocytosis    Hyponatremia      Assessment    Acute perforated appendicitis with abscess status post laparoscopic appendectomy, POA.  TYRELL due to urinary retention   Diabetes mellitus type 2 with hyperglycemia, POA.  Essential hypertension.  Obesity with a BMI of 37.  Status post left BKA with prosthesis  Leukocytosis   Hyponatremia       Plan:    Acute perforated appendicitis.  Leukocytosis  -Status post laparoscopic appendectomy 3/13/2024  - Zosyn   - Morphine/Norco and Zofran for pain control and nausea.    -- increase bowel regimen.  - Dr. Zayas following.  " Appreciate recommendations.  - Diet advanced to GI/low irritant.       Diabetes mellitus type 2.  - increase insulin regimen due to hyperglycemia,   -- add pre meal coverage, continue sliding scale  - A1c 9.3.  -Diabetic educator consulted.    Hypertension  -toprol     TYRELL  Urinary Retention   -- due to urinary retention  -- place hernández cath  -- gentle IVF x 1 L  -- avoid nephrotoxic agents  -- flomax     Hyponatremia  -- gentle IVF x 1 L  -- check urine sodium and osmo  -- serum osmo   -- check TSH       I have reviewed the copied text and it is accurate as of 3/16/2024      DVT Prophylaxis: Enoxaparin  Code Status: Full  Diet: GI soft  Discharge Plan: home with home health 2-3 days     KAILEE Wilson  03/16/24  10:22 EDT    Dictated utilizing Dragon dictation.

## 2024-03-16 NOTE — PROGRESS NOTES
LOS: 2 days   Patient Care Team:  Sami Fisher MD as PCP - General (Internal Medicine)  Provider, No Known as PCP - Family Medicine    Chief Complaint:  POD#3    Subjective     Interval History:   Ongoing difficulties with urinary retention encountered overnight. Straight cath x 1 required. Tamsulosin started this AM by Hospitalist service APRN. Ledezma catheter subsequently placed.  Remains afebrile.  BM x 1 recorded by nursing staff.  Tolerating diet.  Reports minimal pain.  Denies n\v.         Objective     Vital Signs  Temp:  [96.3 °F (35.7 °C)-98.5 °F (36.9 °C)] 97.5 °F (36.4 °C)  Heart Rate:  [81-94] 81  Resp:  [16-24] 16  BP: ()/(48-73) 120/61    PO 600mL    UOP 1600mL  BRITTANIE 185mL  BM x 1    Physical Exam:     General Appearance:    Alert, cooperative, in no acute distress   Head:    Normocephalic, without obvious abnormality, atraumatic   Eyes:            Lids and lashes normal, conjunctivae and sclerae normal, no icterus   Ears:    Ears appear intact with no abnormalities noted   Lungs:     Respirations regular, even and unlabored    Heart:    Regular rhythm and normal rate   Abdomen:     Soft, nontender, nondistended, BRITTANIE serosanguinous.    Genitalia:    Deferred   Extremities:   Left BKA   Skin:   No bleeding, bruising or rash   Neurologic:   AAOx3, no gross deficits          Results Review:    I reviewed the patient's new clinical results.    Results from last 7 days   Lab Units 03/16/24  0527 03/15/24  0617 03/14/24  0707 03/13/24  0553 03/12/24  2040   SODIUM mmol/L 130* 131* 133*   < > 132*   POTASSIUM mmol/L 3.9 4.1 4.9   < > 4.2   CHLORIDE mmol/L 96* 95* 101   < > 95*   CO2 mmol/L 22.8 21.2* 22.9   < > 24.8   BUN mg/dL 23* 17 13   < > 13   CREATININE mg/dL 1.71* 1.32* 1.24   < > 1.27   CALCIUM mg/dL 8.1* 8.1* 8.7   < > 9.4   BILIRUBIN mg/dL  --   --   --   --  0.6   ALK PHOS U/L  --   --   --   --  97   ALT (SGPT) U/L  --   --   --   --  17   AST (SGOT) U/L  --   --   --   --  15   GLUCOSE  mg/dL 227* 204* 228*   < > 286*    < > = values in this interval not displayed.       Results from last 7 days   Lab Units 03/16/24  0527 03/15/24  0617 03/14/24  0707   WBC 10*3/mm3 15.28* 15.26* 14.64*   HEMOGLOBIN g/dL 12.3* 12.8* 13.8   HEMATOCRIT % 36.8* 38.8 40.8   PLATELETS 10*3/mm3 268 269 251         Medication Review:   Scheduled Meds:atorvastatin, 40 mg, Oral, Daily  enoxaparin, 40 mg, Subcutaneous, Q24H  [Held by provider] furosemide, 40 mg, Oral, Daily  Insulin Aspart, 2-9 Units, Subcutaneous, 4x Daily AC & at Bedtime  Insulin Aspart, 4 Units, Subcutaneous, TID With Meals  [START ON 3/17/2024] insulin detemir, 20 Units, Subcutaneous, Daily  [Held by provider] losartan, 25 mg, Oral, Q24H  metoprolol succinate XL, 25 mg, Oral, Q24H  piperacillin-tazobactam, 4.5 g, Intravenous, Q8H  senna-docusate sodium, 2 tablet, Oral, Daily  sodium chloride, 10 mL, Intravenous, Q12H  tamsulosin, 0.4 mg, Oral, Daily      Continuous Infusions:Pharmacy to Dose enoxaparin (LOVENOX),   Pharmacy to Dose Zosyn,   [Held by provider] sodium chloride, 100 mL/hr, Last Rate: Stopped (03/14/24 1608)  sodium chloride, 75 mL/hr, Last Rate: 75 mL/hr (03/16/24 1041)      PRN Meds:.  acetaminophen **OR** acetaminophen **OR** acetaminophen    senna-docusate sodium **AND** polyethylene glycol **AND** bisacodyl **AND** bisacodyl    dextrose    dextrose    glucagon (human recombinant)    HYDROcodone-acetaminophen    Morphine **AND** naloxone    ondansetron    Pharmacy to Dose enoxaparin (LOVENOX)    Pharmacy to Dose Zosyn    sodium chloride    sodium chloride    sodium chloride      Assessment & Plan       Acute appendicitis with perforation, localized peritonitis, abscess, and gangrene    Type 2 diabetes mellitus, with long-term current use of insulin    Essential hypertension    Rupture of appendix    TYRELL (acute kidney injury)    Leukocytosis    Hyponatremia    Mr. Mendez is POD#3 from a laparoscopic appendectomy with drainage of a large  intraabdominal abscess and drain placement.  Although he is clinically well-appearing, his white blood cell count remains elevated at 15,000, and has not yet begun to trend down.  I would recommend continued IV antibiotic therapy with Zosyn for now.  Continue BRITTANIE drain.  Continue diet as tolerated.  I encouraged Mr. Mendez to be out of bed, and ambulating is much as possible to improve his return of bowel function, and also in view of his postoperative urinary retention.  Agree with Ledezma catheter placement and initiation of tamsulosin.  I warned the patient that he may require discharge home with a Ledezma catheter in place, and a subsequent in office trial of void.  This will be determined as he moves closer to discharge.  Continue DVT prophylaxis with Lovenox, aggressive pulmonary toileting, and routine postoperative care.    Yazmin Zayas MD  03/16/24  10:50 EDT

## 2024-03-16 NOTE — PAYOR COMM NOTE
"Joya Mendez (58 y.o. Male)       Date of Birth   1965    Social Security Number       Address   [address] [place] [address]    Home Phone   [phone]    MRN   [id number]       Pentecostalism   None    Marital Status   Single                            Admission Date   3/12/24    Admission Type   Emergency    Admitting Provider   Ryan Ozuna MD    Attending Provider   Vilma Dia MD    [facility]       Discharge Date       Discharge Disposition       Discharge Destination                                 Attending Provider: Vilma Dia MD    Allergies: No Known Allergies    Isolation: None   Infection: None   Code Status: CPR    Ht: 182.9 cm (72\")   Wt: 122 kg (269 lb 10 oz)    Admission Cmt: None   Principal Problem: Acute appendicitis with perforation, localized peritonitis, abscess, and gangrene [K35.33]                   Active Insurance as of 3/12/2024       Primary Coverage       Payor Plan Insurance Group Employer/Plan Group    ANTHEM MEDICARE REPLACEMENT ANTHEM MEDICARE ADVANTAGE [id number]       Payor Plan Address Payor Plan Phone Number Payor Plan Fax Number Effective Dates    [address] [phone]  1/1/2020 - None Entered    [facility] [id number]         Subscriber Name Subscriber Birth Date Member ID       JOYA MENDEZ 1965 [id number]                     Emergency Contacts        (Rel.) Home Phone Work Phone Mobile Phone    TODD PEDRO (Friend) -- -- [phone]              Lab Results (last 48 hours)       Procedure Component Value Units Date/Time    POC Glucose Once [[id number]]  (Abnormal) Collected: 03/16/24 1117    Specimen: Blood Updated: 03/16/24 1120     Glucose 278 mg/dL      Comment: Serial Number: [id number]:  [id number]       Sodium, Urine, Random - Indwelling Urethral Catheter [[id number]] Collected: 03/16/24 1050    Specimen: Urine from Indwelling Urethral " Catheter Updated: 03/16/24 1111     Sodium, Urine 42 mmol/L     Narrative:      Reference intervals for random urine have not been established.  Clinical usage is dependent upon physician's interpretation in combination with other laboratory tests.       Osmolality, Urine - Indwelling Urethral Catheter [[id number]] Collected: 03/16/24 1050    Specimen: Urine from Indwelling Urethral Catheter Updated: 03/16/24 1058    TSH [[id number]]  (Abnormal) Collected: 03/16/24 0527    Specimen: Blood Updated: 03/16/24 1039     TSH 6.480 uIU/mL     POC Glucose 4x Daily Before Meals & at Bedtime [[id number]]  (Abnormal) Collected: 03/16/24 0817    Specimen: Blood Updated: 03/16/24 0819     Glucose 238 mg/dL      Comment: Serial Number: [id number]:  [id number]       Basic Metabolic Panel [[id number]]  (Abnormal) Collected: 03/16/24 0527    Specimen: Blood Updated: 03/16/24 0607     Glucose 227 mg/dL      BUN 23 mg/dL      Creatinine 1.71 mg/dL      Sodium 130 mmol/L      Potassium 3.9 mmol/L      Chloride 96 mmol/L      CO2 22.8 mmol/L      Calcium 8.1 mg/dL      BUN/Creatinine Ratio 13.5     Anion Gap 11.2 mmol/L      eGFR 46.1 mL/min/1.73     Narrative:      GFR Normal >60  Chronic Kidney Disease <60  Kidney Failure <15      CBC & Differential [[id number]]  (Abnormal) Collected: 03/16/24 0527    Specimen: Blood Updated: 03/16/24 0554    Narrative:      The following orders were created for panel order CBC & Differential.  Procedure                               Abnormality         Status                     ---------                               -----------         ------                     CBC Auto Differential[[id number]]        Abnormal            Final result                 Please view results for these tests on the individual orders.    CBC Auto Differential [[id number]]  (Abnormal) Collected: 03/16/24 0527    Specimen: Blood Updated: 03/16/24 0554     WBC 15.28 10*3/mm3      RBC 4.26 10*6/mm3      Hemoglobin 12.3 g/dL       Hematocrit 36.8 %      MCV 86.4 fL      MCH 28.9 pg      MCHC 33.4 g/dL      RDW 13.2 %      RDW-SD 41.4 fl      MPV 9.7 fL      Platelets 268 10*3/mm3      Neutrophil % 76.3 %      Lymphocyte % 13.6 %      Monocyte % 7.0 %      Eosinophil % 1.5 %      Basophil % 0.3 %      Immature Grans % 1.3 %      Neutrophils, Absolute 11.66 10*3/mm3      Lymphocytes, Absolute 2.08 10*3/mm3      Monocytes, Absolute 1.07 10*3/mm3      Eosinophils, Absolute 0.23 10*3/mm3      Basophils, Absolute 0.04 10*3/mm3      Immature Grans, Absolute 0.20 10*3/mm3      nRBC 0.0 /100 WBC     Blood Culture - Blood, Hand, Right [[id number]]  (Normal) Collected: 24 0109    Specimen: Blood from Hand, Right Updated: 24     Blood Culture No growth at 3 days    Blood Culture - Blood, Arm, Left [[id number]]  (Normal) Collected: 24 0112    Specimen: Blood from Arm, Left Updated: 24 013     Blood Culture No growth at 3 days    POC Glucose Once [[id number]]  (Abnormal) Collected: 03/15/24 2039    Specimen: Blood Updated: 03/15/24 2042     Glucose 328 mg/dL      Comment: Serial Number: [id number]:  [id number]       POC Glucose Once [[id number]]  (Abnormal) Collected: 03/15/24 1632    Specimen: Blood Updated: 03/15/24 1634     Glucose 304 mg/dL      Comment: Serial Number: [id number]:  [id number]       TISSUE EXAM, [facility] (GEN,COR,MAD) [[id number]] Collected: 24 1530    Specimen: Tissue from Large Intestine, Appendix Updated: 03/15/24 1232     Reference Lab Report --     [facility]  [address] [place] [address]  Phone: [phone] or [phone]  Fax: [phone]  Js Maciel M.D., Medical Director    PATIENT NAME                           LABORATORY NO.  [id number]  JOYA LUDWIG                      [id number]  [id number]                         AGE              SEX  SSN           CLIENT REF #  [facility]            58      1965      [id number]    "[id number]    [address] [place] [address] BY-PASS                REQUESTING M.D.     ATTENDING M.D.     COPY TO.  [address]                        [place] [address]                 DATE COLLECTED      DATE RECEIVED      DATE REPORTED  03/13/2024          03/14/2024         03/15/2024    DIAGNOSIS:  APPENDIX:  Acute appendicitis with perforation and transmural ischemic necrosis.      CLINICAL HISTORY:  Perforated appendicitis    SPECIMENS RECEIVED:  APPENDIX    MICROSCOPIC DESCRIPTION:  Tissue blocks are prepared and slides are examined microscopically on all  specimens. See diagnosis for details.    Professional interpretation rendered by Elpidio Mercado M.D., ERNIE at [facility], [address] [place] [address].    GROSS DESCRIPTION:  Received in formalin and labeled \"perforated appendix\" is a ragged and  disrupted appendix measuring 3.3 cm in length and 1.5 cm in diameter.  Scant  mesoappendix is grossly identified.  The serosal surface is red-brown, dull, and  remarkable for serosal exudate and multiple areas of perforation measuring up to  1.5 cm in greatest dimension, located 1.0 cm from the resection margin.  The  proximal margin is separate from the main body of the appendix (found in  container).  The proximal margin is inked black.  The perforated areas are inked  orange.  The appendix is serially sectioned from proximal to distal to reveal a 0.4  cm lumen.  No fecaliths or masses are grossly identified.  Representative  sections of the proximal, mid, and half of distal tip of appendix are submitted  A1, to include area of perforation.  [place]    REVIEWED, DIAGNOSED AND ELECTRONICALLY  SIGNED BY:    Elpidio Mercado M.D., F.C.A.P.  CPT CODES:  06498      POC Glucose 4x Daily Before Meals & at Bedtime [[id number]]  (Abnormal) Collected: 03/15/24 1116    Specimen: Blood Updated: 03/15/24 1118     Glucose 234 mg/dL      Comment: Serial Number: [id number]:  [id number]       Basic " Metabolic Panel [[id number]]  (Abnormal) Collected: 03/15/24 0617    Specimen: Blood Updated: 03/15/24 0735     Glucose 204 mg/dL      BUN 17 mg/dL      Creatinine 1.32 mg/dL      Sodium 131 mmol/L      Potassium 4.1 mmol/L      Chloride 95 mmol/L      CO2 21.2 mmol/L      Calcium 8.1 mg/dL      BUN/Creatinine Ratio 12.9     Anion Gap 14.8 mmol/L      eGFR 62.9 mL/min/1.73     Narrative:      GFR Normal >60  Chronic Kidney Disease <60  Kidney Failure <15      POC Glucose 4x Daily Before Meals & at Bedtime [[id number]]  (Abnormal) Collected: 03/15/24 0728    Specimen: Blood Updated: 03/15/24 0731     Glucose 219 mg/dL      Comment: Serial Number: [id number]:  [id number]       CBC & Differential [[id number]]  (Abnormal) Collected: 03/15/24 0617    Specimen: Blood Updated: 03/15/24 0718    Narrative:      The following orders were created for panel order CBC & Differential.  Procedure                               Abnormality         Status                     ---------                               -----------         ------                     CBC Auto Differential[[id number]]        Abnormal            Final result                 Please view results for these tests on the individual orders.    CBC Auto Differential [[id number]]  (Abnormal) Collected: 03/15/24 0617    Specimen: Blood Updated: 03/15/24 0718     WBC 15.26 10*3/mm3      RBC 4.46 10*6/mm3      Hemoglobin 12.8 g/dL      Hematocrit 38.8 %      MCV 87.0 fL      MCH 28.7 pg      MCHC 33.0 g/dL      RDW 13.1 %      RDW-SD 41.5 fl      MPV 9.7 fL      Platelets 269 10*3/mm3      Neutrophil % 77.5 %      Lymphocyte % 13.9 %      Monocyte % 6.6 %      Eosinophil % 0.8 %      Basophil % 0.2 %      Immature Grans % 1.0 %      Neutrophils, Absolute 11.83 10*3/mm3      Lymphocytes, Absolute 2.12 10*3/mm3      Monocytes, Absolute 1.00 10*3/mm3      Eosinophils, Absolute 0.12 10*3/mm3      Basophils, Absolute 0.03 10*3/mm3      Immature Grans, Absolute 0.16 10*3/mm3       nRBC 0.0 /100 WBC     POC Glucose Once [[id number]]  (Abnormal) Collected: 24    Specimen: Blood Updated: 24     Glucose 299 mg/dL      Comment: Serial Number: [id number]:  [id number]       POC Glucose Once [[id number]]  (Abnormal) Collected: 24    Specimen: Blood Updated: 24     Glucose 451 mg/dL      Comment: Serial Number: [id number]:  [id number]             Imaging Results (Last 48 Hours)       ** No results found for the last 48 hours. **             Physician Progress Notes (last 48 hours)        Aminah Orellana APRN at 24 0945              [facility]    Name:  Deep Mendez   Age:  58 y.o.  Sex:  male  :  1965  MRN:  [id number]   Visit Number:  [id number]  Admission Date:  3/12/2024  Date Of Service:  24  Primary Care Physician:  Sami Fisher MD     LOS: 2 days :    Chief Complaint:      Abdominal pain    Subjective:  Nursing reports continued urinary retention. 800ml bladder scan at 0500 this morning, required I&O cath, 475 currently, still no BM  He reports no abdominal pain, reports low back pain that he attributes to the bed    Hospital Course:    Deep Mendez is a 58-year old male with history of hypertension and diabetes mellitus type 2 on insulin and Trulicity was brought to the emergency room by his friend with right lower quadrant abdominal pain that started earlier in the day.  Patient stated that he was in his usual state of health, went to the bathroom had a bowel movement, subsequently developed right upper and lower quadrant pain that was sharp and radiating up into the chest.  He had some nausea but no vomiting.  No prior history of abdominal surgeries.  He is diabetic and is status post below knee amputation.  No fevers.     In the emergency room, he was afebrile and hemodynamically stable saturating at 97% on room air but his blood pressure was elevated at 164/88.   Initial troponin 45 with a repeat at 48.  CMP was unremarkable except for a sodium of 132 and a glucose of 286.  Lipase normal at 16.  CBC unremarkable except for a white count of 13.6.  CT of the abdomen and pelvis done in the emergency room showed findings consistent with perforated appendicitis with ill-defined developing periappendiceal abscess.  Nonobstructing punctate left renal stones were noted.  Patient's condition was discussed with Dr. Zayas from surgical services who recommended antibiotics therapy and admission to the hospital.  Patient was given morphine, Zofran and IV Zosyn in the emergency room.  He also received a liter of normal saline bolus and was  admitted to the medical floor with telemetry for perforated appendicitis.  Otherwise reassuring labs and vitals noted.  A1c noted to be 9.3 with prior 12.  Patient taken for laparoscopic appendectomy per general surgeon Dr. Zayas on 3/13/2024 without any acute complications noted.  Patient was noted to have large abscess intraoperatively for which BRITTANIE drain was placed as well.  IV antibiotics continued.  Metoprolol XL added for persistent tachycardia. Urinary retention with hernández insertion and addition of flomax.        Review of Systems:     All systems were reviewed and negative except as mentioned in subjective, assessment and plan.    Vital Signs:    Temp:  [96.3 °F (35.7 °C)-98.5 °F (36.9 °C)] 97.5 °F (36.4 °C)  Heart Rate:  [81-94] 81  Resp:  [16-24] 16  BP: ()/(48-73) 120/61    Intake and output:    I/O last 3 completed shifts:  In: 2499 [P.O.:960; I.V.:739; IV Piggyback:800]  Out: 3185 [Urine:2900; Drains:285]  I/O this shift:  In: 240 [P.O.:240]  Out: 500 [Urine:500]    Physical Examination:  Constitutional: No acute distress, awake, alert, sitting up in chair  HENT: NCAT, mucous membranes moist  Respiratory: grossly clear, respiratory effort normal   Cardiovascular: RRR, no murmurs, rubs, or gallops  Gastrointestinal: Positive bowel  "sounds, midline incision with steri strips, C/D/I. LLQ BRITTANIE drain noted  Psychiatric: Appropriate affect, cooperative  Neurologic: Oriented x 3, strength symmetric in all extremities, Cranial Nerves grossly intact to confrontation, speech clear  Skin: No rashes      Laboratory results:    Results from last 7 days   Lab Units 03/16/24  0527 03/15/24  0617 03/14/24  0707 03/13/24  0553 03/12/24  2040   SODIUM mmol/L 130* 131* 133*   < > 132*   POTASSIUM mmol/L 3.9 4.1 4.9   < > 4.2   CHLORIDE mmol/L 96* 95* 101   < > 95*   CO2 mmol/L 22.8 21.2* 22.9   < > 24.8   BUN mg/dL 23* 17 13   < > 13   CREATININE mg/dL 1.71* 1.32* 1.24   < > 1.27   CALCIUM mg/dL 8.1* 8.1* 8.7   < > 9.4   BILIRUBIN mg/dL  --   --   --   --  0.6   ALK PHOS U/L  --   --   --   --  97   ALT (SGPT) U/L  --   --   --   --  17   AST (SGOT) U/L  --   --   --   --  15   GLUCOSE mg/dL 227* 204* 228*   < > 286*    < > = values in this interval not displayed.     Results from last 7 days   Lab Units 03/16/24  0527 03/15/24  0617 03/14/24  0707   WBC 10*3/mm3 15.28* 15.26* 14.64*   HEMOGLOBIN g/dL 12.3* 12.8* 13.8   HEMATOCRIT % 36.8* 38.8 40.8   PLATELETS 10*3/mm3 268 269 251         Results from last 7 days   Lab Units 03/12/24  2242 03/12/24  2040   HSTROP T ng/L 48* 45*     Results from last 7 days   Lab Units 03/13/24  0112 03/13/24  0109   BLOODCX  No growth at 3 days No growth at 3 days     No results for input(s): \"PHART\", \"GRZ8OMU\", \"PO2ART\", \"XOV0SCV\", \"BASEEXCESS\" in the last 8760 hours.   I have reviewed the patient's laboratory results.    Radiology results:    No radiology results from the last 24 hrs  I have reviewed the patient's radiology reports.    Medication Review:     I have reviewed the patient's active and prn medications.     Problem List:      Acute appendicitis with perforation, localized peritonitis, abscess, and gangrene    Type 2 diabetes mellitus, with long-term current use of insulin    Essential hypertension    Rupture of " appendix    TYRELL (acute kidney injury)    Leukocytosis    Hyponatremia      Assessment    Acute perforated appendicitis with abscess status post laparoscopic appendectomy, POA.  TYRELL due to urinary retention   Diabetes mellitus type 2 with hyperglycemia, POA.  Essential hypertension.  Obesity with a BMI of 37.  Status post left BKA with prosthesis  Leukocytosis   Hyponatremia       Plan:    Acute perforated appendicitis.  Leukocytosis  -Status post laparoscopic appendectomy 3/13/2024  - Zosyn   - Morphine/Norco and Zofran for pain control and nausea.    -- increase bowel regimen.  - Dr. Zayas following.  Appreciate recommendations.  - Diet advanced to GI/low irritant.       Diabetes mellitus type 2.  - increase insulin regimen due to hyperglycemia,   -- add pre meal coverage, continue sliding scale  - A1c 9.3.  -Diabetic educator consulted.    Hypertension  -toprol     TYRELL  Urinary Retention   -- due to urinary retention  -- place hernández cath  -- gentle IVF x 1 L  -- avoid nephrotoxic agents  -- flomax     Hyponatremia  -- gentle IVF x 1 L  -- check urine sodium and osmo  -- serum osmo   -- check TSH       I have reviewed the copied text and it is accurate as of 3/16/2024      DVT Prophylaxis: Enoxaparin  Code Status: Full  Diet: GI soft  Discharge Plan: home with home health 2-3 days     KAILEE Wilson  24  10:22 EDT    Dictated utilizing Dragon dictation.      Electronically signed by Aminah Orellana APRN at 24 1022       Janel Lorenzo APRN at 03/15/24 1221              [facility]    Name:  Depe Mendez   Age:  58 y.o.  Sex:  male  :  1965  MRN:  [id number]   Visit Number:  [id number]  Admission Date:  3/12/2024  Date Of Service:  03/15/24  Primary Care Physician:  Sami Fisher MD     LOS: 1 day :    Chief Complaint:      Abdominal pain    Subjective:    Patient seen and examined with no family at bedside.  He just received pain  medications and is drowsy.  States is passing flatus.  Is voiding without difficulty after he was noted to have severe urinary retention yesterday.  States his back is hurting from lying in bed.  Updated would be getting up today with therapy as well.  States pain otherwise controlled.    Hospital Course:    Deep Mendez is a 58-year old male with history of hypertension and diabetes mellitus type 2 on insulin and Trulicity was brought to the emergency room by his friend with right lower quadrant abdominal pain that started earlier in the day.  Patient stated that he was in his usual state of health, went to the bathroom had a bowel movement, subsequently developed right upper and lower quadrant pain that was sharp and radiating up into the chest.  He had some nausea but no vomiting.  No prior history of abdominal surgeries.  He is diabetic and is status post below knee amputation.  No fevers.     In the emergency room, he was afebrile and hemodynamically stable saturating at 97% on room air but his blood pressure was elevated at 164/88.  Initial troponin 45 with a repeat at 48.  CMP was unremarkable except for a sodium of 132 and a glucose of 286.  Lipase normal at 16.  CBC unremarkable except for a white count of 13.6.  CT of the abdomen and pelvis done in the emergency room showed findings consistent with perforated appendicitis with ill-defined developing periappendiceal abscess.  Nonobstructing punctate left renal stones were noted.  Patient's condition was discussed with Dr. Zayas from surgical services who recommended antibiotics therapy and admission to the hospital.  Patient was given morphine, Zofran and IV Zosyn in the emergency room.  He also received a liter of normal saline bolus and was  admitted to the medical floor with telemetry for perforated appendicitis.  Otherwise reassuring labs and vitals noted.  A1c noted to be 9.3 with prior 12.  Patient taken for laparoscopic appendectomy per general  surgeon Dr. Zayas on 3/13/2024 without any acute complications noted.  Patient was noted to have large abscess intraoperatively for which BRITTANIE drain was placed as well.  IV antibiotics continued.  Metoprolol XL added for persistent tachycardia.       Review of Systems:     All systems were reviewed and negative except as mentioned in subjective, assessment and plan.    Vital Signs:    Temp:  [96.3 °F (35.7 °C)-99.2 °F (37.3 °C)] 96.3 °F (35.7 °C)  Heart Rate:  [91-97] 96  Resp:  [14-24] 24  BP: ()/(54-62) 100/57    Intake and output:    I/O last 3 completed shifts:  In: 1340 [P.O.:1080; I.V.:260]  Out: 2775 [Urine:2500; Drains:275]  I/O this shift:  In: 240 [P.O.:240]  Out: 135 [Drains:135]    Physical Examination:    General Appearance:  Alert and cooperative.  Chronically ill middle-age male.  No acute distress.   Head:  Atraumatic and normocephalic.   Eyes: Conjunctivae and sclerae normal, no icterus. No pallor.   Throat: No oral lesions, no thrush, oral mucosa moist.   Neck: Supple, trachea midline, no thyromegaly.   Lungs:   Breath sounds heard bilaterally equally.  No wheezing or crackles. No Pleural rub or bronchial breathing.  On room air unlabored.   Heart:  Normal S1 and S2, no murmur, no gallop, no rub. No JVD.   Abdomen:   Normal bowel sounds, no masses, no organomegaly. Soft, nontender, nondistended, no rebound tenderness.  Slight discomfort generalized.  BRITTANIE drain noted to left abdomen with serosanguineous drainage.  Lap sites noted.   Extremities: Supple, right lower extremity edema, no cyanosis, no clubbing.  Left prosthetic leg noted below the knee amputation   Skin: No bleeding or rash.  Right lower extremity chronic venous stasis noted.   Neurologic: Alert and oriented x 3. No facial asymmetry. Moves all four limbs. No tremors.      Laboratory results:    Results from last 7 days   Lab Units 03/15/24  0617 03/14/24  0707 03/13/24  0553 03/12/24  2040   SODIUM mmol/L 131* 133* 134* 132*  "  POTASSIUM mmol/L 4.1 4.9 4.1 4.2   CHLORIDE mmol/L 95* 101 100 95*   CO2 mmol/L 21.2* 22.9 22.8 24.8   BUN mg/dL 17 13 13 13   CREATININE mg/dL 1.32* 1.24 1.30* 1.27   CALCIUM mg/dL 8.1* 8.7 8.4* 9.4   BILIRUBIN mg/dL  --   --   --  0.6   ALK PHOS U/L  --   --   --  97   ALT (SGPT) U/L  --   --   --  17   AST (SGOT) U/L  --   --   --  15   GLUCOSE mg/dL 204* 228* 143* 286*     Results from last 7 days   Lab Units 03/15/24  0617 03/14/24  0707 03/13/24  0553   WBC 10*3/mm3 15.26* 14.64* 12.39*   HEMOGLOBIN g/dL 12.8* 13.8 12.4*   HEMATOCRIT % 38.8 40.8 36.8*   PLATELETS 10*3/mm3 269 251 225         Results from last 7 days   Lab Units 03/12/24  2242 03/12/24  2040   HSTROP T ng/L 48* 45*     Results from last 7 days   Lab Units 03/13/24  0112 03/13/24  0109   BLOODCX  No growth at 2 days No growth at 2 days     No results for input(s): \"PHART\", \"CKJ7NVP\", \"PO2ART\", \"ALL4RMR\", \"BASEEXCESS\" in the last 8760 hours.   I have reviewed the patient's laboratory results.    Radiology results:    No radiology results from the last 24 hrs  I have reviewed the patient's radiology reports.    Medication Review:     I have reviewed the patient's active and prn medications.     Problem List:      Acute appendicitis with perforation, localized peritonitis, abscess, and gangrene    Type 2 diabetes mellitus, with long-term current use of insulin    Essential hypertension    Rupture of appendix      Assessment    Acute perforated appendicitis with abscess status post laparoscopic appendectomy, POA.  Diabetes mellitus type 2 with hyperglycemia, POA.  Essential hypertension.  Obesity with a BMI of 37.  Status post left BKA with prosthesis      Plan:    Acute perforated appendicitis.  -Status post laparoscopic appendectomy 3/13/2024  - Patient will be continued on IV antibiotic therapy with Zosyn for several days.  - Morphine/Norco and Zofran for pain control and nausea.  Continue bowel regimen.  - Dr. Zayas following.  Appreciate " recommendations.  - Diet advanced to GI/low irritant.  -Trend leukocytosis  -PT/OT/case management consulted.     Diabetes mellitus type 2.  - Continue Levemir with up titration due to persistent hyperglycemia.  - Subcutaneous insulin protocol for coverage.  - A1c 9.3.  -Diabetic educator consulted.    Hypertension  -Does not currently take any home medications for this.  -Cozaar and Toprol-XL added as patient tachycardic as well.  Slight hypotension noted.  Cozaar held for now.  Patient also likely needs daily diuretic for right lower extremity chronic edema.    -Patient will likely discharge home with home health.  However, he will require several further days of IV antibiotic therapy.    I have reviewed the copied text and it is accurate as of 3/15/2024      DVT Prophylaxis: Enoxaparin  Code Status: Full  Diet: Clears  Discharge Plan: Several days.    KAILEE Gillis  03/15/24  12:21 EDT    Dictated utilizing Dragon dictation.      Electronically signed by Janel Lorenzo APRN at 03/15/24 1231       Yazmin Zayas MD at 03/15/24 0917               LOS: 1 day   Patient Care Team:  Sami Fisher MD as PCP - General (Internal Medicine)  Provider, No Known as PCP - Family Medicine    Chief Complaint:  POD#2    Subjective     Interval History:     No acute events reported overnight.  Patient reports pain control acceptable.  Denies nausea/vomiting. Passing more flatus. Tolerated full liquids for breakfast.     Objective     Vital Signs  Temp:  [97.7 °F (36.5 °C)-99.2 °F (37.3 °C)] 98 °F (36.7 °C)  Heart Rate:  [91-97] 92  Resp:  [12-18] 12  BP: ()/(54-66) 115/54    UOP 2500mL  BRITTANIE 175mL    Physical Exam:     General Appearance:    Alert, cooperative, in no acute distress   Head:    Normocephalic, without obvious abnormality, atraumatic   Eyes:            Lids and lashes normal, conjunctivae and sclerae normal, no icterus   Ears:    Ears appear intact with no abnormalities noted   Lungs:      Respirations regular, even and unlabored    Heart:    Regular rhythm and normal rate   Abdomen:     Soft, non-tender, non-distended, incision c/d/I.  BRITTANIE serosanguinous   Skin:   No bleeding, bruising or rash   Neurologic:   AAOx3, no gross deficits          Results Review:    I reviewed the patient's new clinical results.  Results from last 7 days   Lab Units 03/15/24  0617 03/14/24  0707 03/13/24  0553 03/12/24  2040   SODIUM mmol/L 131* 133* 134* 132*   POTASSIUM mmol/L 4.1 4.9 4.1 4.2   CHLORIDE mmol/L 95* 101 100 95*   CO2 mmol/L 21.2* 22.9 22.8 24.8   BUN mg/dL 17 13 13 13   CREATININE mg/dL 1.32* 1.24 1.30* 1.27   CALCIUM mg/dL 8.1* 8.7 8.4* 9.4   BILIRUBIN mg/dL  --   --   --  0.6   ALK PHOS U/L  --   --   --  97   ALT (SGPT) U/L  --   --   --  17   AST (SGOT) U/L  --   --   --  15   GLUCOSE mg/dL 204* 228* 143* 286*       Results from last 7 days   Lab Units 03/15/24  0617 03/14/24  0707 03/13/24  0553   WBC 10*3/mm3 15.26* 14.64* 12.39*   HEMOGLOBIN g/dL 12.8* 13.8 12.4*   HEMATOCRIT % 38.8 40.8 36.8*   PLATELETS 10*3/mm3 269 251 225         Medication Review:   Scheduled Meds:atorvastatin, 40 mg, Oral, Daily  enoxaparin, 40 mg, Subcutaneous, Q24H  furosemide, 40 mg, Oral, Daily  Insulin Aspart, 2-9 Units, Subcutaneous, 4x Daily AC & at Bedtime  insulin detemir, 15 Units, Subcutaneous, Daily  [Held by provider] losartan, 25 mg, Oral, Q24H  metoprolol succinate XL, 25 mg, Oral, Q24H  piperacillin-tazobactam, 4.5 g, Intravenous, Q8H  sodium chloride, 10 mL, Intravenous, Q12H      Continuous Infusions:Pharmacy to Dose enoxaparin (LOVENOX),   Pharmacy to Dose Zosyn,   [Held by provider] sodium chloride, 100 mL/hr, Last Rate: Stopped (03/14/24 0352)      PRN Meds:.  acetaminophen **OR** acetaminophen **OR** acetaminophen    senna-docusate sodium **AND** polyethylene glycol **AND** bisacodyl **AND** bisacodyl    dextrose    dextrose    glucagon (human recombinant)    HYDROcodone-acetaminophen    Morphine **AND**  naloxone    ondansetron    Pharmacy to Dose enoxaparin (LOVENOX)    Pharmacy to Dose Zosyn    sodium chloride    sodium chloride    sodium chloride      Assessment & Plan       Acute appendicitis with perforation, localized peritonitis, abscess, and gangrene    Type 2 diabetes mellitus, with long-term current use of insulin    Essential hypertension    Rupture of appendix    Mr. Mendez is POD#2 from a laparoscopic appendectomy with drainage of a large intraabdominal abscess and drain placement. He continue to do well thus far. He is anxious to have his diet further advanced.  I have agreed to a GI soft diet but encouraged him to be cautious regarding over indulgence.  I emphasized to him the need to be out of bed as much as possible.  We discussed aggressive pulmonary toileting. Continue IV antibiotics. He had no additional questions at the end of the visit.      Yazmin Zayas MD  03/15/24  09:17 EDT        Electronically signed by Yazmin Zayas MD at 03/15/24 7572       Consult Notes (last 48 hours)  Notes from 03/14/24 1302 through 03/16/24 1302   No notes of this type exist for this encounter.

## 2024-03-16 NOTE — THERAPY TREATMENT NOTE
Patient Name: Deep Mendez  : 1965    MRN: [id number]                              Today's Date: 3/16/2024       Admit Date: 3/12/2024    Visit Dx:     ICD-10-CM ICD-9-CM   1. Perforated appendicitis  K35.32 540.0     Patient Active Problem List   Diagnosis    Acute appendicitis with perforation, localized peritonitis, abscess, and gangrene    Type 2 diabetes mellitus, with long-term current use of insulin    Essential hypertension    Rupture of appendix    TYRELL (acute kidney injury)    Leukocytosis    Hyponatremia     Past Medical History:   Diagnosis Date    Diabetes mellitus     Hypertension     Impaired mobility      Past Surgical History:   Procedure Laterality Date    APPENDECTOMY N/A 3/13/2024    Procedure: APPENDECTOMY LAPAROSCOPIC;  Surgeon: Yazmin Zayas MD;  Location: [facility];  Service: General;  Laterality: N/A;    BELOW KNEE LEG AMPUTATION      EYE SURGERY        General Information       Row Name 24          Physical Therapy Time and Intention    Document Type therapy note (daily note)  -CC     Mode of Treatment physical therapy  -CC       Row Name 24          General Information    Patient Profile Reviewed yes  -CC     Existing Precautions/Restrictions fall  -CC       Row Name 24          Safety Issues, Functional Mobility    Safety Issues Affecting Function (Mobility) awareness of need for assistance;insight into deficits/self-awareness;positioning of assistive device;problem-solving;safety precautions follow-through/compliance  -CC     Impairments Affecting Function (Mobility) endurance/activity tolerance;strength;pain;motor planning  -CC               User Key  (r) = Recorded By, (t) = Taken By, (c) = Cosigned By      Initials Name Provider Type    CC Christy Nava PTA Physical Therapist Assistant                   Mobility       Row Name 24          Sit-Stand Transfer    Sit-Stand [place] (Transfers) verbal cues;contact guard   "-CC     Assistive Device (Sit-Stand Transfers) walker, front-wheeled  -CC       Row Name 03/16/24 1716          Gait/Stairs (Locomotion)    Patient was able to Ambulate no, other medical factors prevent ambulation  -CC     Reason Patient was unable to Ambulate Other (Comment)  declined amb today \"I'm not ready to walk yet  -CC               User Key  (r) = Recorded By, (t) = Taken By, (c) = Cosigned By      Initials Name Provider Type    CC Christy Nava PTA Physical Therapist Assistant                   Obj/Interventions    No documentation.                  Goals/Plan    No documentation.                  Clinical Impression       Row Name 03/16/24 1718          Pain    Pretreatment Pain Rating 0/10 - no pain  -CC     Posttreatment Pain Rating 0/10 - no pain  -CC     Additional Documentation Pain Scale: Numbers Pre/Post-Treatment (Group)  -       Row Name 03/16/24 1718          Plan of Care Review    Plan of Care Reviewed With patient  -CC     Progress improving  -CC     Outcome Evaluation Pt agreeable to physical therapy. Performed sit <->stand withCGA with VC for hand placement, static standing with S x3 mins without LOB however pt reported feeling fatigued. Performed B LE ex in sitting LAQ, hip abd, marching and reclined QS and glute sets 1x10 reps and R Ap 1x10 reps. Pt with L BKA with prothesis donned. Con't with PT POC and progress as tolerated  -       Row Name 03/16/24 1718          Positioning and Restraints    Pre-Treatment Position sitting in chair/recliner  -CC     Post Treatment Position chair  -CC     In Chair notified nsg;reclined;call light within reach;encouraged to call for assist;exit alarm on  -CC               User Key  (r) = Recorded By, (t) = Taken By, (c) = Cosigned By      Initials Name Provider Type    Christy Gauthier PTA Physical Therapist Assistant                   Outcome Measures       Row Name 03/16/24 1723 03/16/24 0824       How much help from another person do you " currently need...    Turning from your back to your side while in flat bed without using bedrails? 4  -CC 4  -HS    Moving from lying on back to sitting on the side of a flat bed without bedrails? 4  -CC 4  -HS    Moving to and from a bed to a chair (including a wheelchair)? 3  -CC 3  -HS    Standing up from a chair using your arms (e.g., wheelchair, bedside chair)? 3  -CC 3  -HS    Climbing 3-5 steps with a railing? 2  -CC 2  -HS    To walk in hospital room? 3  -CC 3  -HS    AM-PAC 6 Clicks Score (PT) 19  - 19  -HS    Highest Level of Mobility Goal 6 --> Walk 10 steps or more  - 6 --> Walk 10 steps or more  -      Row Name 03/16/24 1723          Functional Assessment    Outcome Measure Options AM-PAC 6 Clicks Basic Mobility (PT)  -               User Key  (r) = Recorded By, (t) = Taken By, (c) = Cosigned By      Initials Name Provider Type     Christy Nava PTA Physical Therapist Assistant     Dionne Collazo RN Registered Nurse                                 Physical Therapy Education       Title: PT OT SLP Therapies (In Progress)       Topic: Physical Therapy (In Progress)       Point: Mobility training (Done)       Learning Progress Summary             Patient Acceptance, E,TB, VU by  at 3/14/2024 1125    Comment: educated pt on importance of OOB mobility during IP admission                         Point: Home exercise program (Done)       Learning Progress Summary             Patient Acceptance, E,TB,D, VU,NR by  at 3/15/2024 1508    Comment: Exercise techniques                         Point: Body mechanics (Done)       Learning Progress Summary             Patient Acceptance, E,TB, VU by  at 3/16/2024 1723    Comment: upright psoture in standing                         Point: Precautions (Not Started)       Learner Progress:  Not documented in this visit.                              User Key       Initials Effective Dates Name Provider Type [facility] 06/16/21 -  Christy Nava  TIGRE MENDOZA Physical Therapist Assistant PT    RM 06/16/21 -  Chai Mcclure PTA Physical Therapist Assistant PT     11/29/23 -  Rachelle Sharp PT Physical Therapist PT                  PT Recommendation and Plan     Plan of Care Reviewed With: patient  Progress: improving  Outcome Evaluation: Pt agreeable to physical therapy. Performed sit <->stand withCGA with VC for hand placement, static standing with S x3 mins without LOB however pt reported feeling fatigued. Performed B LE ex in sitting LAQ, hip abd, marching and reclined QS and glute sets 1x10 reps and R Ap 1x10 reps. Pt with L BKA with prothesis donned. Con't with PT POC and progress as tolerated     Time Calculation:         PT Charges       Row Name 03/16/24 1724             Time Calculation    PT Received On 03/16/24  -CC      PT Goal Re-Cert Due Date 03/24/24  -CC         Time Calculation- PT    Total Timed Code Minutes- PT 30 minute(s)  -CC         Timed Charges    10333 - PT Therapeutic Exercise Minutes 15  -CC      38594 - PT Therapeutic Activity Minutes 15  -CC         Total Minutes    Timed Charges Total Minutes 30  -CC       Total Minutes 30  -CC                User Key  (r) = Recorded By, (t) = Taken By, (c) = Cosigned By      Initials Name Provider Type    CC Christy Nava PTA Physical Therapist Assistant                  Therapy Charges for Today       Code Description Service Date Service Provider Modifiers Qty    00294403115 HC PT THER PROC EA 15 MIN 3/16/2024 Christy Nava PTA GP 1    50434409865 HC PT THERAPEUTIC ACT EA 15 MIN 3/16/2024 Christy Nava PTA GP 1            PT G-Codes  Outcome Measure Options: AM-PAC 6 Clicks Basic Mobility (PT)  AM-PAC 6 Clicks Score (PT): 19  AM-PAC 6 Clicks Score (OT): 21       Christy Nava PTA  3/16/2024

## 2024-03-17 LAB
ANION GAP SERPL CALCULATED.3IONS-SCNC: 12.7 MMOL/L (ref 5–15)
BUN SERPL-MCNC: 20 MG/DL (ref 6–20)
BUN/CREAT SERPL: 16.4 (ref 7–25)
CALCIUM SPEC-SCNC: 8.1 MG/DL (ref 8.6–10.5)
CHLORIDE SERPL-SCNC: 95 MMOL/L (ref 98–107)
CO2 SERPL-SCNC: 23.3 MMOL/L (ref 22–29)
CREAT SERPL-MCNC: 1.22 MG/DL (ref 0.76–1.27)
DEPRECATED RDW RBC AUTO: 40.3 FL (ref 37–54)
EGFRCR SERPLBLD CKD-EPI 2021: 69.1 ML/MIN/1.73
ERYTHROCYTE [DISTWIDTH] IN BLOOD BY AUTOMATED COUNT: 12.9 % (ref 12.3–15.4)
GLUCOSE BLDC GLUCOMTR-MCNC: 323 MG/DL (ref 70–130)
GLUCOSE BLDC GLUCOMTR-MCNC: 337 MG/DL (ref 70–130)
GLUCOSE BLDC GLUCOMTR-MCNC: 352 MG/DL (ref 70–130)
GLUCOSE BLDC GLUCOMTR-MCNC: 406 MG/DL (ref 70–130)
GLUCOSE SERPL-MCNC: 313 MG/DL (ref 65–99)
HCT VFR BLD AUTO: 37.2 % (ref 37.5–51)
HGB BLD-MCNC: 12.4 G/DL (ref 13–17.7)
MCH RBC QN AUTO: 28.6 PG (ref 26.6–33)
MCHC RBC AUTO-ENTMCNC: 33.3 G/DL (ref 31.5–35.7)
MCV RBC AUTO: 85.7 FL (ref 79–97)
OSMOLALITY SERPL: 287 MOSM/KG (ref 275–300)
PLATELET # BLD AUTO: 250 10*3/MM3 (ref 140–450)
PMV BLD AUTO: 9.9 FL (ref 6–12)
POTASSIUM SERPL-SCNC: 4.2 MMOL/L (ref 3.5–5.2)
RBC # BLD AUTO: 4.34 10*6/MM3 (ref 4.14–5.8)
SODIUM SERPL-SCNC: 131 MMOL/L (ref 136–145)
WBC NRBC COR # BLD AUTO: 11.17 10*3/MM3 (ref 3.4–10.8)

## 2024-03-17 PROCEDURE — 97116 GAIT TRAINING THERAPY: CPT

## 2024-03-17 PROCEDURE — 97110 THERAPEUTIC EXERCISES: CPT

## 2024-03-17 PROCEDURE — 25010000002 PIPERACILLIN SOD-TAZOBACTAM PER 1 G: Performed by: SURGERY

## 2024-03-17 PROCEDURE — 82948 REAGENT STRIP/BLOOD GLUCOSE: CPT | Performed by: SURGERY

## 2024-03-17 PROCEDURE — 99024 POSTOP FOLLOW-UP VISIT: CPT | Performed by: SURGERY

## 2024-03-17 PROCEDURE — 82948 REAGENT STRIP/BLOOD GLUCOSE: CPT

## 2024-03-17 PROCEDURE — 63710000001 INSULIN DETEMIR PER 5 UNITS: Performed by: NURSE PRACTITIONER

## 2024-03-17 PROCEDURE — 80048 BASIC METABOLIC PNL TOTAL CA: CPT | Performed by: NURSE PRACTITIONER

## 2024-03-17 PROCEDURE — 25010000002 ENOXAPARIN PER 10 MG: Performed by: SURGERY

## 2024-03-17 PROCEDURE — 83930 ASSAY OF BLOOD OSMOLALITY: CPT | Performed by: NURSE PRACTITIONER

## 2024-03-17 PROCEDURE — 63710000001 INSULIN DETEMIR PER 5 UNITS: Performed by: FAMILY MEDICINE

## 2024-03-17 PROCEDURE — 63710000001 INSULIN ASPART PER 5 UNITS: Performed by: NURSE PRACTITIONER

## 2024-03-17 PROCEDURE — 85027 COMPLETE CBC AUTOMATED: CPT | Performed by: NURSE PRACTITIONER

## 2024-03-17 PROCEDURE — 63710000001 INSULIN ASPART PER 5 UNITS: Performed by: INTERNAL MEDICINE

## 2024-03-17 PROCEDURE — 99232 SBSQ HOSP IP/OBS MODERATE 35: CPT | Performed by: FAMILY MEDICINE

## 2024-03-17 RX ORDER — AMOXICILLIN AND CLAVULANATE POTASSIUM 875; 125 MG/1; MG/1
1 TABLET, FILM COATED ORAL 2 TIMES DAILY
Qty: 20 TABLET | Refills: 0 | Status: SHIPPED | OUTPATIENT
Start: 2024-03-17 | End: 2024-03-27

## 2024-03-17 RX ORDER — HYDROCODONE BITARTRATE AND ACETAMINOPHEN 7.5; 325 MG/1; MG/1
1 TABLET ORAL EVERY 4 HOURS PRN
Qty: 10 TABLET | Refills: 0 | Status: SHIPPED | OUTPATIENT
Start: 2024-03-17

## 2024-03-17 RX ADMIN — INSULIN ASPART 8 UNITS: 100 INJECTION, SOLUTION INTRAVENOUS; SUBCUTANEOUS at 08:58

## 2024-03-17 RX ADMIN — INSULIN DETEMIR 20 UNITS: 100 INJECTION, SOLUTION SUBCUTANEOUS at 20:48

## 2024-03-17 RX ADMIN — INSULIN ASPART 9 UNITS: 100 INJECTION, SOLUTION INTRAVENOUS; SUBCUTANEOUS at 20:47

## 2024-03-17 RX ADMIN — Medication 10 ML: at 20:48

## 2024-03-17 RX ADMIN — POLYETHYLENE GLYCOL 3350 17 G: 17 POWDER, FOR SOLUTION ORAL at 09:06

## 2024-03-17 RX ADMIN — HYDROCODONE BITARTRATE AND ACETAMINOPHEN 1 TABLET: 7.5; 325 TABLET ORAL at 09:06

## 2024-03-17 RX ADMIN — PIPERACILLIN SODIUM AND TAZOBACTAM SODIUM 4.5 G: 4; .5 INJECTION, SOLUTION INTRAVENOUS at 02:05

## 2024-03-17 RX ADMIN — ENOXAPARIN SODIUM 40 MG: 40 INJECTION SUBCUTANEOUS at 05:45

## 2024-03-17 RX ADMIN — PIPERACILLIN SODIUM AND TAZOBACTAM SODIUM 4.5 G: 4; .5 INJECTION, SOLUTION INTRAVENOUS at 11:46

## 2024-03-17 RX ADMIN — INSULIN ASPART 4 UNITS: 100 INJECTION, SOLUTION INTRAVENOUS; SUBCUTANEOUS at 11:45

## 2024-03-17 RX ADMIN — INSULIN ASPART 7 UNITS: 100 INJECTION, SOLUTION INTRAVENOUS; SUBCUTANEOUS at 11:46

## 2024-03-17 RX ADMIN — INSULIN ASPART 4 UNITS: 100 INJECTION, SOLUTION INTRAVENOUS; SUBCUTANEOUS at 17:25

## 2024-03-17 RX ADMIN — HYDROCODONE BITARTRATE AND ACETAMINOPHEN 1 TABLET: 7.5; 325 TABLET ORAL at 00:02

## 2024-03-17 RX ADMIN — Medication 10 ML: at 08:58

## 2024-03-17 RX ADMIN — HYDROCODONE BITARTRATE AND ACETAMINOPHEN 1 TABLET: 7.5; 325 TABLET ORAL at 17:25

## 2024-03-17 RX ADMIN — TAMSULOSIN HYDROCHLORIDE 0.4 MG: 0.4 CAPSULE ORAL at 08:58

## 2024-03-17 RX ADMIN — INSULIN ASPART 8 UNITS: 100 INJECTION, SOLUTION INTRAVENOUS; SUBCUTANEOUS at 17:26

## 2024-03-17 RX ADMIN — METOPROLOL SUCCINATE 25 MG: 25 TABLET, EXTENDED RELEASE ORAL at 08:57

## 2024-03-17 RX ADMIN — ATORVASTATIN CALCIUM 40 MG: 40 TABLET, FILM COATED ORAL at 08:57

## 2024-03-17 RX ADMIN — PIPERACILLIN SODIUM AND TAZOBACTAM SODIUM 4.5 G: 4; .5 INJECTION, SOLUTION INTRAVENOUS at 17:26

## 2024-03-17 RX ADMIN — STANDARDIZED SENNA CONCENTRATE AND DOCUSATE SODIUM 2 TABLET: 8.6; 5 TABLET, FILM COATED ORAL at 08:57

## 2024-03-17 RX ADMIN — INSULIN ASPART 4 UNITS: 100 INJECTION, SOLUTION INTRAVENOUS; SUBCUTANEOUS at 08:58

## 2024-03-17 RX ADMIN — INSULIN DETEMIR 20 UNITS: 100 INJECTION, SOLUTION SUBCUTANEOUS at 08:58

## 2024-03-17 NOTE — PLAN OF CARE
Goal Outcome Evaluation:  Plan of Care Reviewed With: patient        Progress: improving  Outcome Evaluation: VSS. RA.  no acute overnight events

## 2024-03-17 NOTE — PLAN OF CARE
Goal Outcome Evaluation:  Plan of Care Reviewed With: patient        Progress: improving  Outcome Evaluation: Vital Signs Stable. Complaints of pain, see MAR. Ledezma catheter removed. Patient due to void. Will continue to monitor.

## 2024-03-17 NOTE — PROGRESS NOTES
[facility]    Name:  Deep Mendez   Age:  58 y.o.  Sex:  male  :  1965  MRN:  [id number]   Visit Number:  [id number]  Admission Date:  3/12/2024  Date Of Service:  24  Primary Care Physician:  Sami Fisher MD     LOS: 3 days :    Chief Complaint:      Follow-up appendicitis    Subjective:    Patient seen this morning, he was sitting up in bedside chair.  He notes he has taken about 2 doses of pain medicine last 24 hours.  He had a Ledezma catheter placed yesterday with clearish urine output.  Discussed likely plan for voiding trial in the morning, and possible discharge thereafter if doing well.    Hospital Course:    Patient is a 58-year-old gentleman with a history of hypertension, insulin-dependent diabetes, who presented to the emergency room with complaints of abdominal pain.  He has a prior history of a left BKA due to complications from diabetes.  His initial workup was concerning for appendicitis.    Patient was seen in consultation by general surgery and was ultimately taken for operative intervention on 3/13/2024.  He had evidence of acute appendicitis with perforation.  He underwent laparoscopic appendectomy with placement of BRITTANIE drain thereafter.  Postoperative course has been complicated by urinary retention currently has a Ledezma catheter that was placed on 3/16/2024.  Has been on empiric antibiotics with Zosyn postoperatively.    Review of Systems:     All systems were reviewed and negative except as mentioned in subjective, assessment and plan.    Vital Signs:    Temp:  [97.5 °F (36.4 °C)-98 °F (36.7 °C)] 97.5 °F (36.4 °C)  Heart Rate:  [80-89] 80  Resp:  [18] 18  BP: (116-144)/(52-78) 122/64    Intake and output:    I/O last 3 completed shifts:  In: 2147.8 [P.O.:840; I.V.:1007.8; IV Piggyback:300]  Out: 3335 [Urine:3150; Drains:185]  I/O this shift:  In: 240 [P.O.:240]  Out: -     Physical Examination:    General Appearance:  Alert and  "cooperative.  NAD   Head:  Atraumatic and normocephalic.   Eyes: Conjunctivae and sclerae normal, no icterus. No pallor.   Throat: No oral lesions, no thrush, oral mucosa moist.   Neck: Supple, trachea midline, no thyromegaly.   Lungs:   Breath sounds heard bilaterally equally.  No wheezing or crackles. No Pleural rub or bronchial breathing.   Heart:  Normal S1 and S2, no murmur, no gallop, no rub. No JVD.   Abdomen:   Bowel sounds are present.  BRITTANIE drain noted drainage.  Trocar sites appear intact and dry.  Ledezma catheter noted.  With   Extremities: Supple, no edema, no cyanosis, no clubbing.  Left BKA clean stump.   Skin: No bleeding or rash.   Neurologic: Alert and oriented x 3. No facial asymmetry. Moves all four limbs. No tremors.      Laboratory results:    Results from last 7 days   Lab Units 03/17/24  0639 03/16/24  0527 03/15/24  0617 03/13/24  0553 03/12/24  2040   SODIUM mmol/L 131* 130* 131*   < > 132*   POTASSIUM mmol/L 4.2 3.9 4.1   < > 4.2   CHLORIDE mmol/L 95* 96* 95*   < > 95*   CO2 mmol/L 23.3 22.8 21.2*   < > 24.8   BUN mg/dL 20 23* 17   < > 13   CREATININE mg/dL 1.22 1.71* 1.32*   < > 1.27   CALCIUM mg/dL 8.1* 8.1* 8.1*   < > 9.4   BILIRUBIN mg/dL  --   --   --   --  0.6   ALK PHOS U/L  --   --   --   --  97   ALT (SGPT) U/L  --   --   --   --  17   AST (SGOT) U/L  --   --   --   --  15   GLUCOSE mg/dL 313* 227* 204*   < > 286*    < > = values in this interval not displayed.     Results from last 7 days   Lab Units 03/17/24  0639 03/16/24  0527 03/15/24  0617   WBC 10*3/mm3 11.17* 15.28* 15.26*   HEMOGLOBIN g/dL 12.4* 12.3* 12.8*   HEMATOCRIT % 37.2* 36.8* 38.8   PLATELETS 10*3/mm3 250 268 269         Results from last 7 days   Lab Units 03/12/24  2242 03/12/24  2040   HSTROP T ng/L 48* 45*     Results from last 7 days   Lab Units 03/13/24  0112 03/13/24  0109   BLOODCX  No growth at 4 days No growth at 4 days     No results for input(s): \"PHART\", \"RBS3DOY\", \"PO2ART\", \"WYC4IGU\", \"BASEEXCESS\" in " the last 8760 hours.   I have reviewed the patient's laboratory results.    Radiology results:    No radiology results from the last 24 hrs  I have reviewed the patient's radiology reports.    Medication Review:     I have reviewed the patient's active and prn medications.     Problem List:      Acute appendicitis with perforation, localized peritonitis, abscess, and gangrene    Type 2 diabetes mellitus, with long-term current use of insulin    Essential hypertension    Rupture of appendix    TYRELL (acute kidney injury)    Leukocytosis    Hyponatremia      Assessment:    Acute perforated appendicitis with abscess status post laparoscopic appendectomy, POA.  TYRELL due to urinary retention   Diabetes mellitus type 2 with hyperglycemia, POA.  Essential hypertension.  Obesity with a BMI of 37.  Status post left BKA with prosthesis  Leukocytosis   Hyponatremia     Plan:    Acute perforated appendicitis.  Leukocytosis  Postoperative day 4 from laparoscopic appendectomy with evidence of perforation of the appendix.  He has been on empiric antibiotics with Zosyn.  White blood cell count normalizing.  He is needing less pain medication each day.  He has had flatus, no bowel movement.  Currently on a GI soft diet.  BRITTANIE drain remains in place.  Dr. Zayas following.    Diabetes mellitus type 2.  Will further uptitrate patient's long-acting insulin as we are currently underdosing him from his home regimen as his appetite is improving.     Hypertension  Currently stable continue with metoprolol.     TYRELL  Urinary Retention   Had evidence of urinary retention and acute kidney injury.  Ledezma catheter was placed on 3/16/2024.  He was started on Flomax.  Will plan on voiding trial on morning of 3/18/2024 prior to discharge otherwise keep Ledezma catheter in upon discharge.     Hyponatremia  Likely related to acute kidney injury and urinary retention.  Stable now.    Discussed with patient likely discharge tomorrow if still doing well.   Voiding trial in the morning.    Patient has a mobility limitation that significantly impairs their ability to participate in one or more mobility- related activities of daily living. The patient is able to safely use the walker. The functional mobility deficit can be sufficiently resolved by use of a walker.     DVT Prophylaxis: Lovenox  Code Status: Full  Diet: GI soft  Discharge Plan: Likely home tomorrow    Shanon Marie DO  03/17/24  11:39 EDT    Dictated utilizing Dragon dictation.

## 2024-03-17 NOTE — PROGRESS NOTES
LOS: 3 days   Patient Care Team:  Sami Fisher MD as PCP - General (Internal Medicine)  Provider, No Known as PCP - Family Medicine    Chief Complaint:  POD#4    Subjective     Interval History:   No acute events reported overnight..  Greatest dimension 0.3 L of urine output in the last 24 hours after being initiated on Flomax.  Catheter recurrent urinary retention postoperative.  Review of the notes from the hospitalist service and the case is also planning for trial of void when he 18/24.  However, the patient tells me that the nursing staff just removed his Ledezma catheter prior to 9 arrival.  He has not yet voided.  He continues to pass flatus.  He has not yet had a bowel movement.  He is tolerating a diet without nausea or vomiting.      Objective     Vital Signs  Temp:  [97.5 °F (36.4 °C)-98 °F (36.7 °C)] 97.5 °F (36.4 °C)  Heart Rate:  [80-89] 80  Resp:  [18] 18  BP: (116-144)/(52-78) 122/64    PO 720mL  UOP 2350mL  BRITTANIE 135mL    Physical Exam:     General Appearance:    Alert, cooperative, in no acute distress   Head:    Normocephalic, without obvious abnormality, atraumatic   Eyes:            Lids and lashes normal, conjunctivae and sclerae normal, no icterus   Ears:    Ears appear intact with no abnormalities noted   Lungs:     Respirations regular, even and unlabored    Heart:    Regular rhythm and normal rate   Abdomen:     Soft, non-tender, non-distended, no guarding, no rebound   tenderness.  BRITTANIE drain output serosanguineous.  BRITTANIE removed without incident and dressing applied.   Extremities:   Left BKA   Pulses:   Pulses palpable and equal bilaterally   Skin:   No bleeding, bruising or rash   Neurologic:   AAOx3, no gross deficits          Results Review:    I reviewed the patient's new clinical results.    Results from last 7 days   Lab Units 03/17/24  0639 03/16/24  0527 03/15/24  0617 03/13/24  0553 03/12/24  2040   SODIUM mmol/L 131* 130* 131*   < > 132*   POTASSIUM mmol/L 4.2 3.9 4.1   < > 4.2    CHLORIDE mmol/L 95* 96* 95*   < > 95*   CO2 mmol/L 23.3 22.8 21.2*   < > 24.8   BUN mg/dL 20 23* 17   < > 13   CREATININE mg/dL 1.22 1.71* 1.32*   < > 1.27   CALCIUM mg/dL 8.1* 8.1* 8.1*   < > 9.4   BILIRUBIN mg/dL  --   --   --   --  0.6   ALK PHOS U/L  --   --   --   --  97   ALT (SGPT) U/L  --   --   --   --  17   AST (SGOT) U/L  --   --   --   --  15   GLUCOSE mg/dL 313* 227* 204*   < > 286*    < > = values in this interval not displayed.       Results from last 7 days   Lab Units 03/17/24  0639 03/16/24  0527 03/15/24  0617   WBC 10*3/mm3 11.17* 15.28* 15.26*   HEMOGLOBIN g/dL 12.4* 12.3* 12.8*   HEMATOCRIT % 37.2* 36.8* 38.8   PLATELETS 10*3/mm3 250 268 269         Medication Review:   Scheduled Meds:atorvastatin, 40 mg, Oral, Daily  enoxaparin, 40 mg, Subcutaneous, Q24H  Insulin Aspart, 2-9 Units, Subcutaneous, 4x Daily AC & at Bedtime  Insulin Aspart, 4 Units, Subcutaneous, TID With Meals  insulin detemir, 20 Units, Subcutaneous, Q12H  metoprolol succinate XL, 25 mg, Oral, Q24H  piperacillin-tazobactam, 4.5 g, Intravenous, Q8H  senna-docusate sodium, 2 tablet, Oral, Daily  sodium chloride, 10 mL, Intravenous, Q12H  tamsulosin, 0.4 mg, Oral, Daily      Continuous Infusions:Pharmacy to Dose enoxaparin (LOVENOX),   Pharmacy to Dose Zosyn,       PRN Meds:.  acetaminophen **OR** acetaminophen **OR** acetaminophen    senna-docusate sodium **AND** polyethylene glycol **AND** bisacodyl **AND** bisacodyl    dextrose    dextrose    glucagon (human recombinant)    HYDROcodone-acetaminophen    Morphine **AND** naloxone    ondansetron    Pharmacy to Dose enoxaparin (LOVENOX)    Pharmacy to Dose Zosyn    sodium chloride    sodium chloride    sodium chloride      Assessment & Plan       Acute appendicitis with perforation, localized peritonitis, abscess, and gangrene    Type 2 diabetes mellitus, with long-term current use of insulin    Essential hypertension    Rupture of appendix    TYRELL (acute kidney injury)     Leukocytosis    Hyponatremia    Mr. Mendez is POD#4 from a laparoscopic appendectomy with drainage of a large intraabdominal abscess and drain placement.  His white blood cell count has trended down to normal, and his abdomen remains benign.  I have discontinued his BRITTANIE drain today, and would feel comfortable with her transition to oral antibiotics (Augmentin) at the time of discharge.  He will need to complete 10 additional days of antibiotics.  I will plan to see him back in the office in 1 week.  He may be discharged home at the discretion of the hospitalist service pending resolution of his other medical issues.    Yazmin Zayas MD  03/17/24  14:46 EDT

## 2024-03-17 NOTE — PLAN OF CARE
Goal Outcome Evaluation:  Plan of Care Reviewed With: patient        Progress: improving  Outcome Evaluation: Pt agreeable to physical therapy. Performed sit <->stand with SBA with VC for hand placement, amb with RW 53 feet with flexed posture and VC for closer proximity to AD especially during turns to decrease risk of tripping self with RW. Performed B LE ex in sitting LAQ, hip abd, marching and reclined QS and glute sets 1x10 reps and R AP 1x10 reps. Pt donned and doffed L BKA  prothesis independently. Con't with PT POC and progress as tolerated

## 2024-03-18 ENCOUNTER — READMISSION MANAGEMENT (OUTPATIENT)
Dept: CALL CENTER | Facility: HOSPITAL | Age: 59
End: 2024-03-18
Payer: MEDICARE

## 2024-03-18 VITALS
RESPIRATION RATE: 18 BRPM | TEMPERATURE: 97.9 F | DIASTOLIC BLOOD PRESSURE: 62 MMHG | WEIGHT: 269.62 LBS | OXYGEN SATURATION: 96 % | HEIGHT: 72 IN | BODY MASS INDEX: 36.52 KG/M2 | HEART RATE: 85 BPM | SYSTOLIC BLOOD PRESSURE: 117 MMHG

## 2024-03-18 LAB
ANION GAP SERPL CALCULATED.3IONS-SCNC: 10.9 MMOL/L (ref 5–15)
BACTERIA SPEC AEROBE CULT: NORMAL
BACTERIA SPEC AEROBE CULT: NORMAL
BUN SERPL-MCNC: 14 MG/DL (ref 6–20)
BUN/CREAT SERPL: 11.8 (ref 7–25)
CALCIUM SPEC-SCNC: 8 MG/DL (ref 8.6–10.5)
CHLORIDE SERPL-SCNC: 99 MMOL/L (ref 98–107)
CO2 SERPL-SCNC: 22.1 MMOL/L (ref 22–29)
CREAT SERPL-MCNC: 1.19 MG/DL (ref 0.76–1.27)
DEPRECATED RDW RBC AUTO: 40.3 FL (ref 37–54)
EGFRCR SERPLBLD CKD-EPI 2021: 71.2 ML/MIN/1.73
ERYTHROCYTE [DISTWIDTH] IN BLOOD BY AUTOMATED COUNT: 13 % (ref 12.3–15.4)
GLUCOSE BLDC GLUCOMTR-MCNC: 293 MG/DL (ref 70–130)
GLUCOSE SERPL-MCNC: 268 MG/DL (ref 65–99)
HCT VFR BLD AUTO: 35.6 % (ref 37.5–51)
HGB BLD-MCNC: 12 G/DL (ref 13–17.7)
MCH RBC QN AUTO: 28.7 PG (ref 26.6–33)
MCHC RBC AUTO-ENTMCNC: 33.7 G/DL (ref 31.5–35.7)
MCV RBC AUTO: 85.2 FL (ref 79–97)
PLATELET # BLD AUTO: 274 10*3/MM3 (ref 140–450)
PMV BLD AUTO: 9.5 FL (ref 6–12)
POTASSIUM SERPL-SCNC: 4.2 MMOL/L (ref 3.5–5.2)
RBC # BLD AUTO: 4.18 10*6/MM3 (ref 4.14–5.8)
SODIUM SERPL-SCNC: 132 MMOL/L (ref 136–145)
WBC NRBC COR # BLD AUTO: 13.08 10*3/MM3 (ref 3.4–10.8)

## 2024-03-18 PROCEDURE — 80048 BASIC METABOLIC PNL TOTAL CA: CPT | Performed by: FAMILY MEDICINE

## 2024-03-18 PROCEDURE — 99239 HOSP IP/OBS DSCHRG MGMT >30: CPT | Performed by: INTERNAL MEDICINE

## 2024-03-18 PROCEDURE — 85027 COMPLETE CBC AUTOMATED: CPT | Performed by: FAMILY MEDICINE

## 2024-03-18 PROCEDURE — 25010000002 PIPERACILLIN SOD-TAZOBACTAM PER 1 G: Performed by: SURGERY

## 2024-03-18 PROCEDURE — 63710000001 INSULIN ASPART PER 5 UNITS: Performed by: INTERNAL MEDICINE

## 2024-03-18 PROCEDURE — 82948 REAGENT STRIP/BLOOD GLUCOSE: CPT | Performed by: SURGERY

## 2024-03-18 PROCEDURE — 63710000001 INSULIN ASPART PER 5 UNITS: Performed by: NURSE PRACTITIONER

## 2024-03-18 PROCEDURE — 63710000001 INSULIN DETEMIR PER 5 UNITS: Performed by: FAMILY MEDICINE

## 2024-03-18 RX ORDER — HYDROCODONE BITARTRATE AND ACETAMINOPHEN 7.5; 325 MG/1; MG/1
1 TABLET ORAL EVERY 4 HOURS PRN
Status: DISCONTINUED | OUTPATIENT
Start: 2024-03-18 | End: 2024-03-18 | Stop reason: HOSPADM

## 2024-03-18 RX ORDER — AMOXICILLIN AND CLAVULANATE POTASSIUM 875; 125 MG/1; MG/1
1 TABLET, FILM COATED ORAL EVERY 12 HOURS SCHEDULED
Status: DISCONTINUED | OUTPATIENT
Start: 2024-03-18 | End: 2024-03-18 | Stop reason: HOSPADM

## 2024-03-18 RX ORDER — TAMSULOSIN HYDROCHLORIDE 0.4 MG/1
0.4 CAPSULE ORAL DAILY
Qty: 7 CAPSULE | Refills: 0 | Status: SHIPPED | OUTPATIENT
Start: 2024-03-19 | End: 2024-03-26

## 2024-03-18 RX ADMIN — ATORVASTATIN CALCIUM 40 MG: 40 TABLET, FILM COATED ORAL at 08:32

## 2024-03-18 RX ADMIN — HYDROCODONE BITARTRATE AND ACETAMINOPHEN 1 TABLET: 7.5; 325 TABLET ORAL at 03:53

## 2024-03-18 RX ADMIN — METOPROLOL SUCCINATE 25 MG: 25 TABLET, EXTENDED RELEASE ORAL at 08:32

## 2024-03-18 RX ADMIN — INSULIN ASPART 4 UNITS: 100 INJECTION, SOLUTION INTRAVENOUS; SUBCUTANEOUS at 08:33

## 2024-03-18 RX ADMIN — INSULIN DETEMIR 20 UNITS: 100 INJECTION, SOLUTION SUBCUTANEOUS at 08:32

## 2024-03-18 RX ADMIN — STANDARDIZED SENNA CONCENTRATE AND DOCUSATE SODIUM 2 TABLET: 8.6; 5 TABLET, FILM COATED ORAL at 08:32

## 2024-03-18 RX ADMIN — PIPERACILLIN SODIUM AND TAZOBACTAM SODIUM 4.5 G: 4; .5 INJECTION, SOLUTION INTRAVENOUS at 02:46

## 2024-03-18 RX ADMIN — Medication 10 ML: at 08:32

## 2024-03-18 RX ADMIN — TAMSULOSIN HYDROCHLORIDE 0.4 MG: 0.4 CAPSULE ORAL at 08:32

## 2024-03-18 RX ADMIN — INSULIN ASPART 6 UNITS: 100 INJECTION, SOLUTION INTRAVENOUS; SUBCUTANEOUS at 08:32

## 2024-03-18 NOTE — PLAN OF CARE
Goal Outcome Evaluation:  Plan of Care Reviewed With: patient        Progress: improving  Outcome Evaluation: VSS. Pt urinating on own without issue. PRN pain medication given. no acute overnigh tevents

## 2024-03-18 NOTE — PLAN OF CARE
Goal Outcome Evaluation: Patient being discharged home today

## 2024-03-18 NOTE — CASE MANAGEMENT/SOCIAL WORK
Case Management Discharge Note                Selected Continued Care - Admitted Since 3/12/2024       Destination    No services have been selected for the patient.                Durable Medical Equipment Coordination complete.      Service Provider Selected Services Address Phone Fax Patient Preferred    [facility] [address] [place], [place] [address]ATE DR DUFF [address] [place] [address] [phone] --       Internal Comment last updated by Sadiq Beebe, RN 3/18/2024 1038    [facility] delivered                         Dialysis/Infusion    No services have been selected for the patient.                Home Medical Care    No services have been selected for the patient.                Therapy    No services have been selected for the patient.                Community Resources    No services have been selected for the patient.                Community & DME    No services have been selected for the patient.                    Transportation Services  Taxi: [employer]    Final Discharge Disposition Code: 01 - home or self-care

## 2024-03-19 NOTE — OUTREACH NOTE
Prep Survey      Flowsheet Row Responses   [facility] facility patient discharged from? [facility]   Is LACE score < 7 ? No   Eligibility Readm Mgmt   Discharge diagnosis Abdominal pain.APPENDECTOMY LAPAROSCOPIC   Does the patient have one of the following disease processes/diagnoses(primary or secondary)? General Surgery   Does the patient have Home health ordered? No   Is there a DME ordered? Yes   What DME was ordered? [facility]   Prep survey completed? Yes            MORAIMA FERREIRA - Registered Nurse

## 2024-03-19 NOTE — PAYOR COMM NOTE
"To:  Lyerly  From: Francisca Isabel RN  Phone: [phone]  Fax: [phone]  NPI: [id number]  TIN: [id number]  Member ID: [id number]  MRN: [id number]    Joya Mendez (58 y.o. Male)       Date of Birth   1965    Social Security Number       Address   [address] [place] [address]    Home Phone   [phone]    MRN   [id number]       Mandaen   None    Marital Status   Single                            Admission Date   3/12/24    Admission Type   Emergency    Admitting Provider   Ryan Ozuna MD    Attending Provider       Department, Room/Bed   [facility] TELEMETRY 4, 428/1       Discharge Date   3/18/2024    Discharge Disposition   Home or Self Care    Discharge Destination   Home                              Attending Provider: (none)   Allergies: No Known Allergies    Isolation: None   Infection: None   Code Status: Prior    Ht: 182.9 cm (72\")   Wt: 122 kg (269 lb 10 oz)    Admission Cmt: None   Principal Problem: Acute appendicitis with perforation, localized peritonitis, abscess, and gangrene [K35.33]                   Active Insurance as of 3/12/2024       Primary Coverage       Payor Plan Insurance Group Employer/Plan Group    ANTHEM MEDICARE REPLACEMENT ANTHEM MEDICARE ADVANTAGE [id number]       Payor Plan Address Payor Plan Phone Number Payor Plan Fax Number Effective Dates    [address] [phone]  2020 - None Entered    [facility] [address]         Subscriber Name Subscriber Birth Date Member ID       JOYA MENDEZ 1965 [id number]                     Emergency Contacts        (Rel.) Home Phone Work Phone Mobile Phone    TODD PEDRO (Friend) -- -- [phone]                 Discharge Summary        Teddy Rogel DO at 24 1157              [facility]   DISCHARGE SUMMARY      Name:  Joya Mendez   Age:  58 y.o.  Sex:  male  :  1965  MRN:  [id number]   Visit Number:  " [id number]    Admission Date:  3/12/2024  Date of Discharge: 3/18/2024  Primary Care Physician:  Sami Fisher MD        Discharge Diagnoses:     Acute perforated appendicitis with abscess status post laparoscopic appendectomy,   TYRELL due to urinary retention   Diabetes mellitus type 2 with hyperglycemia,   Essential hypertension.  Obesity with a BMI of 37.  Status post left BKA with prosthesis  Leukocytosis   Hyponatremia     Problem List:     Active Hospital Problems    Diagnosis  POA    **Acute appendicitis with perforation, localized peritonitis, abscess, and gangrene [K35.33]  Yes    TYRELL (acute kidney injury) [N17.9]  Unknown    Leukocytosis [D72.829]  Unknown    Hyponatremia [E87.1]  Unknown    Rupture of appendix [K35.32]  Yes    Type 2 diabetes mellitus, with long-term current use of insulin [E11.9, Z79.4]  Not Applicable    Essential hypertension [I10]  Yes      Resolved Hospital Problems   No resolved problems to display.     Presenting Problem:    Chief Complaint   Patient presents with    Abdominal Pain      Consults:     Consulting Physician(s)         Provider   Role Specialty     Yazmin Zayas MD      Consulting Physician General Surgery          Procedures Performed:    Procedure(s):  APPENDECTOMY LAPAROSCOPIC    History of presenting illness/Hospital Course:    Patient is a 58-year-old gentleman with a history of hypertension, insulin-dependent diabetes, who presented to the emergency room with complaints of abdominal pain.  He has a prior history of a left BKA due to complications from diabetes.  His initial workup was concerning for appendicitis.     Patient was seen in consultation by general surgery and was ultimately taken for operative intervention on 3/13/2024.  He had evidence of acute appendicitis with perforation.  He underwent laparoscopic appendectomy with placement of BRITTANIE drain thereafter.  Postoperative course has been complicated by urinary retention currently has a Ledezma catheter  that was placed on 3/16/2024.  Has been on empiric antibiotics with Zosyn postoperatively.  Patient had return of appropriate bowel function.  Tolerating a GI soft diet.  BRITTANIE drain removed prior to discharge.    Patient successfully voided post Ledezma catheter removal on day of discharge.  He was continued on Flomax at discharge.  Patient was also prescribed Augmentin to complete antibiotic course.  He was also prescribed short course of Norco for as needed pain.  Close follow-up with primary physician.  Follow-up with Dr. Espinal in the office in 1 week.  Patient provided strict return instructions.    Vital Signs:         Physical Exam:    General Appearance:  Alert and cooperative.    Head:  Atraumatic and normocephalic.   Eyes: Conjunctivae and sclerae normal, no icterus. No pallor.   Ears:  Ears with no abnormalities noted.   Throat: No oral lesions, no thrush, oral mucosa moist.   Neck: Supple, trachea midline, no thyromegaly.   Back:   No kyphoscoliosis present. No tenderness to palpation.   Lungs:   Breath sounds heard bilaterally equally.  No crackles or wheezing. No Pleural rub or bronchial breathing.   Heart:  Normal S1 and S2, no murmur, no gallop, no rub. No JVD.   Abdomen:   Normal bowel sounds, Soft, nontender, nondistended, no rebound tenderness.  Surgical incisions CDI   Extremities: Supple, no edema, no cyanosis, no clubbing.  Left BKA.   Pulses: Pulses palpable bilaterally.   Skin: No bleeding or rash.   Neurologic: Alert and oriented x 3. No facial asymmetry.      Pertinent Lab Results:     Results from last 7 days   Lab Units 03/18/24  0544 03/17/24  0639 03/16/24  0527 03/13/24  0553 03/12/24  2040   SODIUM mmol/L 132* 131* 130*   < > 132*   POTASSIUM mmol/L 4.2 4.2 3.9   < > 4.2   CHLORIDE mmol/L 99 95* 96*   < > 95*   CO2 mmol/L 22.1 23.3 22.8   < > 24.8   BUN mg/dL 14 20 23*   < > 13   CREATININE mg/dL 1.19 1.22 1.71*   < > 1.27   CALCIUM mg/dL 8.0* 8.1* 8.1*   < > 9.4   BILIRUBIN mg/dL  --    --   --   --  0.6   ALK PHOS U/L  --   --   --   --  97   ALT (SGPT) U/L  --   --   --   --  17   AST (SGOT) U/L  --   --   --   --  15   GLUCOSE mg/dL 268* 313* 227*   < > 286*    < > = values in this interval not displayed.     Results from last 7 days   Lab Units 03/18/24  0544 03/17/24  0639 03/16/24  0527   WBC 10*3/mm3 13.08* 11.17* 15.28*   HEMOGLOBIN g/dL 12.0* 12.4* 12.3*   HEMATOCRIT % 35.6* 37.2* 36.8*   PLATELETS 10*3/mm3 274 250 268         Results from last 7 days   Lab Units 03/12/24  2242 03/12/24  2040   HSTROP T ng/L 48* 45*             Results from last 7 days   Lab Units 03/12/24  2040   LIPASE U/L 16         Results from last 7 days   Lab Units 03/13/24  0112 03/13/24  0109   BLOODCX  No growth at 5 days No growth at 5 days       Pertinent Radiology Results:    Imaging Results (All)       Procedure Component Value Units Date/Time    CT Abdomen Pelvis With Contrast [[id number]] Collected: 03/13/24 0020     Updated: 03/13/24 0022    Narrative:      FINAL REPORT    TECHNIQUE:  null    CLINICAL HISTORY:  RUQ abd pain    COMPARISON:  null    FINDINGS:  CT abdomen and pelvis with contrast    Comparison: None    Findings:    There is mild bibasilar atelectasis.    The liver, pancreas, spleen, and adrenal glands are unremarkable. There are few tiny stones in the gallbladder. Gallbladder appears otherwise normal. There is a punctate nonobstructing left renal stone. Right kidney is unremarkable. The proximal portion   of the appendix is dilated. The distal portion of the appendix is ruptured with an ill-defined developing abscess containing foci of gas measuring about 4.5 x 3.7 cm. There is reactive wall thickening of the distal sigmoid colon which passes immediately   adjacent to the collection. There is no bowel obstruction. Aorta is normal diameter. There are no enlarged lymph nodes. There is no acute fracture or suspicious lytic or sclerotic lesion. There is a chronic appearing mild superior endplate  compression   fracture of L5.      Impression:      Impression:    1. Findings consistent with perforated appendicitis with ill-defined developing periappendiceal abscess.    2. Chronic findings as above.    Authenticated and Electronically Signed by Musa Esquivel MD on  03/13/2024 12:20:57 AM            Echo:      Condition on Discharge:      Stable.    Code status during the hospital stay:    Code Status and Medical Interventions:   Ordered at: 03/13/24 0158     Code Status (Patient has no pulse and is not breathing):    CPR (Attempt to Resuscitate)     Medical Interventions (Patient has pulse or is breathing):    Full Support     Discharge Disposition:    Home or Self Care    Discharge Medications:       Discharge Medications        New Medications        Instructions Start Date   amoxicillin-clavulanate 875-125 MG per tablet  Commonly known as: AUGMENTIN   1 tablet, Oral, 2 Times Daily      HYDROcodone-acetaminophen 7.5-325 MG per tablet  Commonly known as: NORCO   1 tablet, Oral, Every 4 Hours PRN      tamsulosin 0.4 MG capsule 24 hr capsule  Commonly known as: FLOMAX   0.4 mg, Oral, Daily             Continue These Medications        Instructions Start Date   atorvastatin 40 MG tablet  Commonly known as: LIPITOR   40 mg, Oral, Daily      Farxiga 10 MG tablet  Generic drug: dapagliflozin Propanediol   1 tablet, Oral, Daily      furosemide 40 MG tablet  Commonly known as: LASIX   40 mg, Oral, Nightly PRN      Insulin Lispro 100 UNIT/ML injection  Commonly known as: humaLOG   45 Units, Subcutaneous, 3 Times Daily Before Meals, 45 units three times daily with meals plus 1:10 correction scale (max daily dose of 200 units)      lisinopril 2.5 MG tablet  Commonly known as: PRINIVIL,ZESTRIL   2.5 mg, Oral, Daily      Toujeo Max SoloStar 300 UNIT/ML solution pen-injector injection  Generic drug: Insulin Glargine (2 Unit Dial)   120 Units, Subcutaneous, Daily      Trulicity 4.5 MG/0.5ML solution pen-injector  Generic  drug: Dulaglutide   4.5 mg, Subcutaneous, Weekly             Discharge Diet:     Diet Instructions       Advance Diet As Tolerated -Target Diet: as tolerated      Target Diet: as tolerated          Activity at Discharge:     Activity Instructions       Discharge Activity      1) No driving while taking narcotic pain medications (i.e. lortab, vicodin, percocet, hydrocodone, oxycodone)   2) Return to daily activity as tolerated.  3) May shower.  No tub soaks, submersion in water or baths for 2 weeks.  4) Do not lift / push / pull/ tug more than 20 lbs x 4 weeks.          Follow-up Appointments:    Additional Instructions for the Follow-ups that You Need to Schedule       Discharge Follow-up with Specialty: Dr. Zayas; 1 Week   As directed      Specialty: Dr. Zayas   Follow Up: 1 Week               Contact information for follow-up providers       Sami Fisher MD Follow up on 4/23/2024.    Specialty: Internal Medicine  Why: @2pm  Contact information:  [address] [place] [address]  [phone]               Provider, No Known .    Contact information:  [facility] [address]                       Contact information for after-discharge care       Durable Medical Equipment       [facility] .    Service: Durable Medical Equipment  Contact information:  [address] [place] [id number]  [phone]                                 Future Appointments   Date Time Provider Department Center   3/27/2024 12:40 PM Yazmin Zayas MD [facility]     Test Results Pending at Discharge:           Teddy Rogel DO  03/19/24  13:05 EDT    Time: I spent >30 minutes on this discharge activity which included: face-to-face encounter with the patient, reviewing the data in the system, coordination of the care with the nursing staff as well as consultants, documentation, and entering orders.     Dictated utilizing Dragon dictation.        Electronically signed by Juan F  DO Teddy at 03/19/24 4278

## 2024-03-19 NOTE — DISCHARGE SUMMARY
[facility]   DISCHARGE SUMMARY      Name:  Deep Mendez   Age:  58 y.o.  Sex:  male  :  1965  MRN:  [id number]   Visit Number:  [id number]    Admission Date:  3/12/2024  Date of Discharge: 3/18/2024  Primary Care Physician:  Sami Fisher MD        Discharge Diagnoses:     Acute perforated appendicitis with abscess status post laparoscopic appendectomy,   TYRELL due to urinary retention   Diabetes mellitus type 2 with hyperglycemia,   Essential hypertension.  Obesity with a BMI of 37.  Status post left BKA with prosthesis  Leukocytosis   Hyponatremia     Problem List:     Active Hospital Problems    Diagnosis  POA    **Acute appendicitis with perforation, localized peritonitis, abscess, and gangrene [K35.33]  Yes    TYRELL (acute kidney injury) [N17.9]  Unknown    Leukocytosis [D72.829]  Unknown    Hyponatremia [E87.1]  Unknown    Rupture of appendix [K35.32]  Yes    Type 2 diabetes mellitus, with long-term current use of insulin [E11.9, Z79.4]  Not Applicable    Essential hypertension [I10]  Yes      Resolved Hospital Problems   No resolved problems to display.     Presenting Problem:    Chief Complaint   Patient presents with    Abdominal Pain      Consults:     Consulting Physician(s)         Provider   Role Specialty     Yazmin Zayas MD      Consulting Physician General Surgery          Procedures Performed:    Procedure(s):  APPENDECTOMY LAPAROSCOPIC    History of presenting illness/Hospital Course:    Patient is a 58-year-old gentleman with a history of hypertension, insulin-dependent diabetes, who presented to the emergency room with complaints of abdominal pain.  He has a prior history of a left BKA due to complications from diabetes.  His initial workup was concerning for appendicitis.     Patient was seen in consultation by general surgery and was ultimately taken for operative intervention on 3/13/2024.  He had evidence of acute appendicitis with perforation.  He  underwent laparoscopic appendectomy with placement of BRITTANIE drain thereafter.  Postoperative course has been complicated by urinary retention currently has a Ledezma catheter that was placed on 3/16/2024.  Has been on empiric antibiotics with Zosyn postoperatively.  Patient had return of appropriate bowel function.  Tolerating a GI soft diet.  BRITTANIE drain removed prior to discharge.    Patient successfully voided post Ledezma catheter removal on day of discharge.  He was continued on Flomax at discharge.  Patient was also prescribed Augmentin to complete antibiotic course.  He was also prescribed short course of Norco for as needed pain.  Close follow-up with primary physician.  Follow-up with Dr. Espinal in the office in 1 week.  Patient provided strict return instructions.    Vital Signs:         Physical Exam:    General Appearance:  Alert and cooperative.    Head:  Atraumatic and normocephalic.   Eyes: Conjunctivae and sclerae normal, no icterus. No pallor.   Ears:  Ears with no abnormalities noted.   Throat: No oral lesions, no thrush, oral mucosa moist.   Neck: Supple, trachea midline, no thyromegaly.   Back:   No kyphoscoliosis present. No tenderness to palpation.   Lungs:   Breath sounds heard bilaterally equally.  No crackles or wheezing. No Pleural rub or bronchial breathing.   Heart:  Normal S1 and S2, no murmur, no gallop, no rub. No JVD.   Abdomen:   Normal bowel sounds, Soft, nontender, nondistended, no rebound tenderness.  Surgical incisions CDI   Extremities: Supple, no edema, no cyanosis, no clubbing.  Left BKA.   Pulses: Pulses palpable bilaterally.   Skin: No bleeding or rash.   Neurologic: Alert and oriented x 3. No facial asymmetry.      Pertinent Lab Results:     Results from last 7 days   Lab Units 03/18/24  0544 03/17/24  0639 03/16/24  0527 03/13/24  0553 03/12/24  2040   SODIUM mmol/L 132* 131* 130*   < > 132*   POTASSIUM mmol/L 4.2 4.2 3.9   < > 4.2   CHLORIDE mmol/L 99 95* 96*   < > 95*   CO2 mmol/L  22.1 23.3 22.8   < > 24.8   BUN mg/dL 14 20 23*   < > 13   CREATININE mg/dL 1.19 1.22 1.71*   < > 1.27   CALCIUM mg/dL 8.0* 8.1* 8.1*   < > 9.4   BILIRUBIN mg/dL  --   --   --   --  0.6   ALK PHOS U/L  --   --   --   --  97   ALT (SGPT) U/L  --   --   --   --  17   AST (SGOT) U/L  --   --   --   --  15   GLUCOSE mg/dL 268* 313* 227*   < > 286*    < > = values in this interval not displayed.     Results from last 7 days   Lab Units 03/18/24  0544 03/17/24  0639 03/16/24  0527   WBC 10*3/mm3 13.08* 11.17* 15.28*   HEMOGLOBIN g/dL 12.0* 12.4* 12.3*   HEMATOCRIT % 35.6* 37.2* 36.8*   PLATELETS 10*3/mm3 274 250 268         Results from last 7 days   Lab Units 03/12/24  2242 03/12/24  2040   HSTROP T ng/L 48* 45*             Results from last 7 days   Lab Units 03/12/24  2040   LIPASE U/L 16         Results from last 7 days   Lab Units 03/13/24  0112 03/13/24  0109   BLOODCX  No growth at 5 days No growth at 5 days       Pertinent Radiology Results:    Imaging Results (All)       Procedure Component Value Units Date/Time    CT Abdomen Pelvis With Contrast [[id number]] Collected: 03/13/24 0020     Updated: 03/13/24 0022    Narrative:      FINAL REPORT    TECHNIQUE:  null    CLINICAL HISTORY:  RUQ abd pain    COMPARISON:  null    FINDINGS:  CT abdomen and pelvis with contrast    Comparison: None    Findings:    There is mild bibasilar atelectasis.    The liver, pancreas, spleen, and adrenal glands are unremarkable. There are few tiny stones in the gallbladder. Gallbladder appears otherwise normal. There is a punctate nonobstructing left renal stone. Right kidney is unremarkable. The proximal portion   of the appendix is dilated. The distal portion of the appendix is ruptured with an ill-defined developing abscess containing foci of gas measuring about 4.5 x 3.7 cm. There is reactive wall thickening of the distal sigmoid colon which passes immediately   adjacent to the collection. There is no bowel obstruction. Aorta is  normal diameter. There are no enlarged lymph nodes. There is no acute fracture or suspicious lytic or sclerotic lesion. There is a chronic appearing mild superior endplate compression   fracture of L5.      Impression:      Impression:    1. Findings consistent with perforated appendicitis with ill-defined developing periappendiceal abscess.    2. Chronic findings as above.    Authenticated and Electronically Signed by Musa Esquivel MD on  03/13/2024 12:20:57 AM            Echo:      Condition on Discharge:      Stable.    Code status during the hospital stay:    Code Status and Medical Interventions:   Ordered at: 03/13/24 0158     Code Status (Patient has no pulse and is not breathing):    CPR (Attempt to Resuscitate)     Medical Interventions (Patient has pulse or is breathing):    Full Support     Discharge Disposition:    Home or Self Care    Discharge Medications:       Discharge Medications        New Medications        Instructions Start Date   amoxicillin-clavulanate 875-125 MG per tablet  Commonly known as: AUGMENTIN   1 tablet, Oral, 2 Times Daily      HYDROcodone-acetaminophen 7.5-325 MG per tablet  Commonly known as: NORCO   1 tablet, Oral, Every 4 Hours PRN      tamsulosin 0.4 MG capsule 24 hr capsule  Commonly known as: FLOMAX   0.4 mg, Oral, Daily             Continue These Medications        Instructions Start Date   atorvastatin 40 MG tablet  Commonly known as: LIPITOR   40 mg, Oral, Daily      Farxiga 10 MG tablet  Generic drug: dapagliflozin Propanediol   1 tablet, Oral, Daily      furosemide 40 MG tablet  Commonly known as: LASIX   40 mg, Oral, Nightly PRN      Insulin Lispro 100 UNIT/ML injection  Commonly known as: humaLOG   45 Units, Subcutaneous, 3 Times Daily Before Meals, 45 units three times daily with meals plus 1:10 correction scale (max daily dose of 200 units)      lisinopril 2.5 MG tablet  Commonly known as: PRINIVIL,ZESTRIL   2.5 mg, Oral, Daily      Toujeo Max SoloStar 300  UNIT/ML solution pen-injector injection  Generic drug: Insulin Glargine (2 Unit Dial)   120 Units, Subcutaneous, Daily      Trulicity 4.5 MG/0.5ML solution pen-injector  Generic drug: Dulaglutide   4.5 mg, Subcutaneous, Weekly             Discharge Diet:     Diet Instructions       Advance Diet As Tolerated -Target Diet: as tolerated      Target Diet: as tolerated          Activity at Discharge:     Activity Instructions       Discharge Activity      1) No driving while taking narcotic pain medications (i.e. lortab, vicodin, percocet, hydrocodone, oxycodone)   2) Return to daily activity as tolerated.  3) May shower.  No tub soaks, submersion in water or baths for 2 weeks.  4) Do not lift / push / pull/ tug more than 20 lbs x 4 weeks.          Follow-up Appointments:    Additional Instructions for the Follow-ups that You Need to Schedule       Discharge Follow-up with Specialty: Dr. Zayas; 1 Week   As directed      Specialty: Dr. Zayas   Follow Up: 1 Week               Contact information for follow-up providers       Sami Fisher MD Follow up on 4/23/2024.    Specialty: Internal Medicine  Why: @2pm  Contact information:  [address] [place] [address]  [address] [facility] [address]  [phone]               Provider, No Known .    Contact information:  [facility] [address]                       Contact information for after-discharge care       Durable Medical Equipment       [facility] .    Service: Durable Medical Equipment  Contact information:  [facility] [address] [place] [address]  [phone]                                 Future Appointments   Date Time Provider Department Center   3/27/2024 12:40 PM Yazmin Zayas MD [facility]     Test Results Pending at Discharge:           Teddy Rogel DO  03/19/24  13:05 EDT    Time: I spent >30 minutes on this discharge activity which included: face-to-face encounter with the patient, reviewing the data in the system, coordination of  the care with the nursing staff as well as consultants, documentation, and entering orders.     Dictated utilizing Dragon dictation.

## 2024-03-26 ENCOUNTER — READMISSION MANAGEMENT (OUTPATIENT)
Dept: CALL CENTER | Facility: HOSPITAL | Age: 59
End: 2024-03-26
Payer: MEDICARE

## 2024-03-26 NOTE — OUTREACH NOTE
General Surgery Week 1 Survey      Flowsheet Row Responses   [facility] patient discharged from? [facility]   Does the patient have one of the following disease processes/diagnoses(primary or secondary)? General Surgery   Week 1 attempt successful? Yes   Call start time 1015   Call end time 1021   Discharge diagnosis Abdominal pain.APPENDECTOMY LAPAROSCOPIC   Person spoke with today (if not patient) and relationship patient   Meds reviewed with patient/caregiver? Yes   Is the patient having any side effects they believe may be caused by any medication additions or changes? Yes   Side effects comments  itching- no rash.   Does the patient have all medications related to this admission filled (includes all antibiotics, pain medications, etc.) Yes   Prescription comments Augmentin, Norco, Flomax   Is the patient taking all medications as directed (includes completed medication regime)? Yes   Does the patient have a follow up appointment scheduled with their surgeon? Yes   Comments 3/27/2024 12:40 PM  POST-OP 10 min [facility] GENERAL SURGERY Yazmin Zayas MD   What DME was ordered? [employer] [facility]   Has all DME been delivered? Yes   Psychosocial issues? No   Did the patient receive a copy of their discharge instructions? Yes   Nursing interventions Reviewed instructions with patient   What is the patient's perception of their health status since discharge? Improving   Nursing interventions Nurse provided patient education   Is the patient /caregiver able to teach back basic post-op care? Keep incision areas clean,dry and protected   Is the patient/caregiver able to teach back signs and symptoms of incisional infection? Increased redness, swelling or pain at the incisonal site, Increased drainage or bleeding, Incisional warmth, Fever, Pus or odor from incision   Is the patient/caregiver able to teach back steps to recovery at home? Set small, achievable goals for return to baseline  health, Rest and rebuild strength, gradually increase activity   Is the patient/caregiver able to teach back the hierarchy of who to call/visit for symptoms/problems? PCP, Specialist, Home health nurse, Urgent Care, ED, 911 Yes   Additional teach back comments Yazmin Zayas- [phone]   Week 1 call completed? Yes   Graduated Yes   Is the patient interested in additional calls from an ambulatory ? No   Would this patient benefit from a Referral to [facility] Social Work? No   Wrap up additional comments Patient reports doing well, no s/s of infection. Did discuss itching only medication new to him  tamsulosin (FLOMAX). He will discuss with Surgeon. No other concerns or questions noted.   Call end time 1021            Maribell HERNANDEZ - Registered Nurse

## 2024-03-27 NOTE — PROGRESS NOTES
"Subjective   Deep Mendez is a 58 y.o. male.   Chief Complaint   Patient presents with    Post-op Follow-up     Post op appendectomy        History of Present Illness   Mr. Mendez returns the office today for routine postoperative follow-up after undergoing a laparoscopic appendectomy for management of acute perforated appendicitis with evidence of early abscess formation.  This was performed on 3/13/2024, and the patient was admitted to [facility] for ongoing postoperative care and IV antibiotic therapy.  He recovered well and was discharged home on 3/18/2024.  Since being home he reports that he has returned to his baseline functional status.  He is tolerating a diet and having regular bowel movements.  He reports minimal discomfort related to the surgical sites and is no longer taking pain medication.  He reports that he did complete his course of oral antibiotic therapy once he was discharged from the hospital.  Today, he has no particular complaints.    Final pathology was reviewed and demonstrated acute appendicitis with perforation and transmural ischemic necrosis.        The following portions of the patient's history were reviewed and updated as appropriate: allergies, current medications, past family history, past medical history, past social history, past surgical history, and problem list.    Review of Systems    Objective   /58   Pulse 97   Temp 97.1 °F (36.2 °C) (Temporal)   Ht 182.9 cm (72\")   Wt 125 kg (275 lb)   SpO2 97%   BMI 37.30 kg/m²     Physical Exam  Constitutional:       Appearance: Normal appearance. He is well-developed. He is obese.   HENT:      Head: Normocephalic and atraumatic.   Cardiovascular:      Rate and Rhythm: Regular rhythm.   Pulmonary:      Effort: Pulmonary effort is normal.   Abdominal:      General: There is no distension.      Palpations: Abdomen is soft.      Tenderness: There is no abdominal tenderness.      Comments: Port site incisions " "well-healed.   Skin:     General: Skin is warm and dry.   Neurological:      Mental Status: He is alert and oriented to person, place, and time.   Psychiatric:         Behavior: Behavior normal.         Reference Lab [facility]  [address] [place] [address]  Phone: [phone] or [phone]  Fax: [phone]  Js Maciel M.D., Medical Director    PATIENT NAME                           LABORATORY NO.  JOYA VERAS                      [id number]  [id number]                         AGE              SEX  SSN           CLIENT REF #  [facility]            58      1965      [id number]   [id number]    [address] [place] [address] BY-PASS                REQUESTING M.D.     ATTENDING M.D.     COPY TO.  [address]                        [place]ILL[place] ROGER, [place] [id number]                 DATE COLLECTED      DATE RECEIVED      DATE REPORTED  2024          2024         03/15/2024    DIAGNOSIS:  APPENDIX:  Acute appendicitis with perforation and transmural ischemic necrosis.      CLINICAL HISTORY:  Perforated appendicitis    SPECIMENS RECEIVED:  APPENDIX    MICROSCOPIC DESCRIPTION:  Tissue blocks are prepared and slides are examined microscopically on all  specimens. See diagnosis for details.    Professional interpretation rendered by Elpidio Mercado M.D., F.C.A.P. at [facility], [place], [address] [place] [address].    GROSS DESCRIPTION:  Received in formalin and labeled \"perforated appendix\" is a ragged and  disrupted appendix measuring 3.3 cm in length and 1.5 cm in diameter.  Scant  mesoappendix is grossly identified.  The serosal surface is red-brown, dull, and  remarkable for serosal exudate and multiple areas of perforation measuring up to  1.5 cm in greatest dimension, located 1.0 cm from the resection margin.  The  proximal margin is separate from the main body of the appendix (found in  container).  The proximal " margin is inked black.  The perforated areas are inked  orange.  The appendix is serially sectioned from proximal to distal to reveal a 0.4  cm lumen.  No fecaliths or masses are grossly identified.  Representative  sections of the proximal, mid, and half of distal tip of appendix are submitted  A1, to include area of perforation.  [place]    REVIEWED, DIAGNOSED AND ELECTRONICALLY  SIGNED BY:    Elpidio Mercado M.D., SANTIAGO.  CPT CODES:  [id number]       Assessment & Plan   Diagnoses and all orders for this visit:    1. S/P laparoscopic appendectomy (Primary)      I had the pleasure of seeing Deep Mendez in follow-up today for thefirst  postoperative visit following laparoscopic appendectomy for  acute appendicitis with perforation and abscess formation .  Overall, Deep Mendez  is enjoying uncomplicated recovery.  I do not anticipate any ongoing surgical issues and will return the patient back to the care of their PCP.  I have released Deep Mendez to unrestricted physical activity beginning four weeks after the operative date. The patient may follow up in this office as needed.

## 2024-03-28 ENCOUNTER — TELEPHONE (OUTPATIENT)
Dept: SURGERY | Facility: CLINIC | Age: 59
End: 2024-03-28

## 2024-04-03 ENCOUNTER — OFFICE VISIT (OUTPATIENT)
Dept: SURGERY | Facility: CLINIC | Age: 59
End: 2024-04-03
Payer: MEDICARE

## 2024-04-03 VITALS
HEART RATE: 97 BPM | SYSTOLIC BLOOD PRESSURE: 102 MMHG | WEIGHT: 275 LBS | DIASTOLIC BLOOD PRESSURE: 58 MMHG | OXYGEN SATURATION: 97 % | HEIGHT: 72 IN | TEMPERATURE: 97.1 F | BODY MASS INDEX: 37.25 KG/M2

## 2024-04-03 DIAGNOSIS — Z90.49 S/P LAPAROSCOPIC APPENDECTOMY: Primary | ICD-10-CM

## 2024-04-03 PROBLEM — K35.32 RUPTURE OF APPENDIX: Status: RESOLVED | Noted: 2024-03-14 | Resolved: 2024-04-03

## 2024-04-03 PROBLEM — K35.33 ACUTE APPENDICITIS WITH PERFORATION, LOCALIZED PERITONITIS, ABSCESS, AND GANGRENE: Status: RESOLVED | Noted: 2024-03-13 | Resolved: 2024-04-03

## 2024-04-03 PROCEDURE — 1160F RVW MEDS BY RX/DR IN RCRD: CPT | Performed by: SURGERY

## 2024-04-03 PROCEDURE — 1159F MED LIST DOCD IN RCRD: CPT | Performed by: SURGERY

## 2024-04-03 PROCEDURE — 3074F SYST BP LT 130 MM HG: CPT | Performed by: SURGERY

## 2024-04-03 PROCEDURE — 3078F DIAST BP <80 MM HG: CPT | Performed by: SURGERY

## 2024-04-03 PROCEDURE — 99024 POSTOP FOLLOW-UP VISIT: CPT | Performed by: SURGERY

## 2024-04-03 RX ORDER — LISINOPRIL 2.5 MG/1
2.5 TABLET ORAL DAILY
COMMUNITY
Start: 2023-12-12 | End: 2024-04-03

## 2024-04-03 RX ORDER — LOSARTAN POTASSIUM 25 MG/1
1 TABLET ORAL DAILY
COMMUNITY

## 2024-04-03 RX ORDER — INSULIN LISPRO 100 [IU]/ML
INJECTION, SOLUTION INTRAVENOUS; SUBCUTANEOUS
COMMUNITY
Start: 2023-10-24

## 2024-08-20 ENCOUNTER — OFFICE VISIT (OUTPATIENT)
Age: 59
End: 2024-08-20
Payer: MEDICARE

## 2024-08-20 VITALS
BODY MASS INDEX: 36.92 KG/M2 | HEART RATE: 100 BPM | WEIGHT: 272.6 LBS | HEIGHT: 72 IN | OXYGEN SATURATION: 97 % | SYSTOLIC BLOOD PRESSURE: 112 MMHG | DIASTOLIC BLOOD PRESSURE: 64 MMHG

## 2024-08-20 DIAGNOSIS — Z79.4 TYPE 2 DIABETES MELLITUS WITH HYPERGLYCEMIA, WITH LONG-TERM CURRENT USE OF INSULIN: Primary | ICD-10-CM

## 2024-08-20 DIAGNOSIS — E11.65 TYPE 2 DIABETES MELLITUS WITH HYPERGLYCEMIA, WITH LONG-TERM CURRENT USE OF INSULIN: Primary | ICD-10-CM

## 2024-08-20 DIAGNOSIS — T87.89 PRESSURE ULCER OF BELOW KNEE AMPUTATION STUMP, STAGE 3: ICD-10-CM

## 2024-08-20 DIAGNOSIS — L89.893 PRESSURE ULCER OF BELOW KNEE AMPUTATION STUMP, STAGE 3: ICD-10-CM

## 2024-08-20 PROBLEM — E11.9 TYPE 2 DIABETES MELLITUS WITHOUT COMPLICATION, WITHOUT LONG-TERM CURRENT USE OF INSULIN: Status: ACTIVE | Noted: 2024-03-13

## 2024-08-20 PROBLEM — L97.521 DIABETIC ULCER OF LEFT FOOT ASSOCIATED WITH TYPE 2 DIABETES MELLITUS, LIMITED TO BREAKDOWN OF SKIN: Status: ACTIVE | Noted: 2024-08-20

## 2024-08-20 PROBLEM — L89.892 PRESSURE ULCER OF BELOW KNEE AMPUTATION STUMP, STAGE 2: Status: ACTIVE | Noted: 2024-08-20

## 2024-08-20 PROBLEM — L97.521 DIABETIC ULCER OF LEFT FOOT ASSOCIATED WITH TYPE 2 DIABETES MELLITUS, LIMITED TO BREAKDOWN OF SKIN: Status: RESOLVED | Noted: 2024-08-20 | Resolved: 2024-08-20

## 2024-08-20 PROBLEM — E11.9 TYPE 2 DIABETES MELLITUS WITHOUT COMPLICATION, WITHOUT LONG-TERM CURRENT USE OF INSULIN: Status: ACTIVE | Noted: 2024-08-20

## 2024-08-20 PROBLEM — E11.621 DIABETIC ULCER OF LEFT FOOT ASSOCIATED WITH TYPE 2 DIABETES MELLITUS, LIMITED TO BREAKDOWN OF SKIN: Status: ACTIVE | Noted: 2024-08-20

## 2024-08-20 PROBLEM — E11.621 DIABETIC ULCER OF LEFT FOOT ASSOCIATED WITH TYPE 2 DIABETES MELLITUS, LIMITED TO BREAKDOWN OF SKIN: Status: RESOLVED | Noted: 2024-08-20 | Resolved: 2024-08-20

## 2024-08-20 LAB
EXPIRATION DATE: ABNORMAL
HBA1C MFR BLD: 9.7 % (ref 4.5–5.7)
Lab: ABNORMAL

## 2024-08-20 PROCEDURE — 1159F MED LIST DOCD IN RCRD: CPT | Performed by: PHYSICIAN ASSISTANT

## 2024-08-20 PROCEDURE — 99204 OFFICE O/P NEW MOD 45 MIN: CPT | Performed by: PHYSICIAN ASSISTANT

## 2024-08-20 PROCEDURE — 3074F SYST BP LT 130 MM HG: CPT | Performed by: PHYSICIAN ASSISTANT

## 2024-08-20 PROCEDURE — 3046F HEMOGLOBIN A1C LEVEL >9.0%: CPT | Performed by: PHYSICIAN ASSISTANT

## 2024-08-20 PROCEDURE — 1160F RVW MEDS BY RX/DR IN RCRD: CPT | Performed by: PHYSICIAN ASSISTANT

## 2024-08-20 PROCEDURE — 3078F DIAST BP <80 MM HG: CPT | Performed by: PHYSICIAN ASSISTANT

## 2024-08-20 PROCEDURE — 83036 HEMOGLOBIN GLYCOSYLATED A1C: CPT | Performed by: PHYSICIAN ASSISTANT

## 2024-08-20 RX ORDER — IBUPROFEN 600 MG/1
1 TABLET ORAL ONCE AS NEEDED
Qty: 1 EACH | Refills: 0 | Status: SHIPPED | OUTPATIENT
Start: 2024-08-20

## 2024-08-20 RX ORDER — INSULIN LISPRO 100 [IU]/ML
45 INJECTION, SOLUTION INTRAVENOUS; SUBCUTANEOUS
Qty: 180 ML | Refills: 0 | Status: SHIPPED | OUTPATIENT
Start: 2024-08-20

## 2024-08-20 RX ORDER — SEMAGLUTIDE 2.68 MG/ML
2 INJECTION, SOLUTION SUBCUTANEOUS WEEKLY
COMMUNITY

## 2024-08-20 RX ORDER — LISINOPRIL 5 MG/1
5 TABLET ORAL DAILY
COMMUNITY

## 2024-08-20 RX ORDER — INSULIN GLARGINE 300 U/ML
114 INJECTION, SOLUTION SUBCUTANEOUS DAILY
Qty: 36 ML | Refills: 1 | Status: SHIPPED | OUTPATIENT
Start: 2024-08-20

## 2024-08-20 NOTE — ASSESSMENT & PLAN NOTE
Left lateral stump with approximately quarter size stage 3 ulceration with surrounding hypertrophy without discharge, erythema, swelling, warmth.   Appears stable without infection.  Advised keep clean and covered with bandage.  Caution signs of worsening/infection.  Advised further management with wound care to prevent progression assist with healing.  Advise continue follow-up with podiatry and prescriber for prosthetic.

## 2024-08-20 NOTE — PATIENT INSTRUCTIONS
Diabetes Treatment Recommendations  Patient     Deep Mendez     Date:        24     ADA General Goals: A1c: < 7%                                                                                   Your A1C is   Lab Results   Component Value Date    HGBA1C 9.7 (A) 2024    HGBA1C 9.30 (H) 2024    HGBA1C 12.0 (H) 03/15/2014     Fasting/before meal glucose: <150 mg/dL                                    2 Hour after meal glucoses: < 180 mg/dL                                        Bedtime glucose:120-180                                                                Glucose testing frequency: daily before insulin use; keep log and bring to office visit    Medication Changes:  - Ozempic 2 mg: inject once weekly  - Farxiga 10 m tablet once daily    - Glucagon injection for severe low blood sugar     Insulin dosing:  Basal insulin Toujeo  114 Units nightly.  Increase by 2 units every 3 days if fasting blood sugar is more than 140.   Keep at current dose if fasting blood sugar is between .  Decrease by 2 units if fasting blood sugar is less than 80 or concerns for hypoglycemia overnight or between meals.               Meal Insulin Humalog (U-100)  45 units (15 minutes) before meals. Add additional correction if blood sugar before meal is more than 150 or if blood sugar is more than 150 I skipped meal three times daily:     Correction insulin (add to meal insulin)   0 unit  if glucose  less than 150   2 unit   if glucose  150 - 199   4 units if glucose 200 - 249   6 units if glucose 250 - 299   8 units if glucose 300 - 349   10  units if glucose Greater than 350     Keep records of your glucose levels and insulin adjustments. We may ask you to keep records on the content of your meals with insulin doses and before/after meal glucose levels to evaluate your ratios.  Call for advice if you have unexplained or unexpected hypoglycemia  (glucose < 60) or persistent high glucose > 300.  Office:  [phone]    Kaye Stanton PA-C

## 2024-08-20 NOTE — PROGRESS NOTES
Chief Complaint   Patient presents with    Diabetes      HPI  Deep Mendez is a 59 y.o. male presents today for evaluation of type 2 diabetes. Referring Provider: No ref. provider found.     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 Reports chronic open sore to left stump >6 months.  Has not been following with wound clinic, he does have a podiatrist he follows with, but not performing wound care. Denies fever/chills, discharge, redness, swelling, warmth.    - Denies hypoglycemia.   - Denies vision changes.   - Denies numbness.   - Denies SOB, chest pain.  - Denies heartburn, constipation, diarrhea, abdominal pain  - Denies increased thirst or urination.     Type 2 diabetes:   Diagnosed with diabetes: around 1998   Complications: Left BKA using prosthetic, history of foot ulcers    - BG at home: does not come with log/meter; reported fasting 1220-130; -150s; occasionally 200s; does not always check before meals; declines use of CGM.    Current regimen includes: Ozempic 2 mg (switched from Trulicity few months ago), Farxiga 10 mg, Toujeo 111 units, Humalog 45 units 2-3 times daily  Has tried: Trulicity, metformin   Compliance with medications is fair.  - HTN: Lisinopril 5 mg, furosemide 40 mg  - HLD: Atorvastatin 40 mg    -Diabetes education/nutritionist: Denies in past, declines at this time    Denies history of pancreatitis.  Denies family history of MENs, thyroid cancers.   Admits family history of diabetes.   Denies family history cardiovascular disease.   Denies family history of thyroid disease.      DM Health Maintenance:  Ophthalmology: 6/2024; hx of retinopathy s/p surgery (2020) Dr. Sanderson  Monofilament / Foot exam: performed today; follows with podiatry q2m Dr. Starks ([place])   Urine microalbumin: cr: 186 8/1/2024   LDL: 67, triglycerides 277 8/1/2024      Social Hx:  Home: lives with wife, dog   Occupation: denies; disability  Diet: Meals: 2-3x/day Breakfast: eggs, sausage, potato bowl, sugar free jello Lunch: sandwich bologna and cheese Dinner: meat + vegetable + carb Snacks/Dessert: not often Drinks: water, diet soda  Exercise: minimal     Past Medical History:   Diagnosis Date    Acute appendicitis with perforation, localized peritonitis, abscess, and gangrene 03/13/2024    Diabetes mellitus     Hypertension     Impaired mobility     Rupture of appendix 03/14/2024    Type 2 diabetes mellitus      Past Surgical History:   Procedure Laterality Date    APPENDECTOMY N/A 3/13/2024    Procedure: APPENDECTOMY LAPAROSCOPIC;  Surgeon: Yazmin Zayas MD;  [facility]: [facility];  Service: General;  Laterality: N/A;    BELOW KNEE LEG AMPUTATION      EYE SURGERY        Family History   Problem Relation Age of Onset    Diabetes Mother     Diabetes Father     Diabetes Sister       Social History     Socioeconomic History    Marital status: Single   Tobacco Use    Smoking status: Former     Types: Cigarettes    Smokeless tobacco: Never   Vaping Use    Vaping status: Never Used   Substance and Sexual Activity    Alcohol use: Never    Drug use: Never    Sexual activity: Defer      Allergies   Allergen Reactions    Duloxetine Hcl Other (See Comments)     Nose bleeds      Current Outpatient Medications on File Prior to Visit   Medication Sig Dispense Refill    atorvastatin (LIPITOR) 40 MG tablet Take 1 tablet by mouth Daily.      dapagliflozin Propanediol (Farxiga) 10 MG tablet Take 10 mg by mouth Daily.      furosemide (LASIX) 40 MG tablet Take 1 tablet by mouth At Night As Needed.      HumaLOG KwikPen 100 UNIT/ML solution pen-injector Sliding scale, max 42 U TID after meals      influenza vac split quad (FLUZONE,FLUARIX,AFLURIA,FLULAVAL) 0.5 ML suspension prefilled syringe injection       Insulin Lispro (humaLOG) 100 UNIT/ML injection Inject 45 Units under the skin into the  "appropriate area as directed 3 (Three) Times a Day Before Meals. 45 units three times daily with meals plus 1:10 correction scale (max daily dose of 200 units)      [DISCONTINUED] lisinopril (PRINIVIL,ZESTRIL) 2.5 MG tablet Take 2 tablets by mouth Daily.      [DISCONTINUED] losartan (COZAAR) 25 MG tablet Take 1 tablet by mouth Daily.      [DISCONTINUED] Trulicity 4.5 MG/0.5ML solution pen-injector Inject 0.5 mL under the skin into the appropriate area as directed 1 (One) Time Per Week.      lisinopril (PRINIVIL,ZESTRIL) 5 MG tablet Take 1 tablet by mouth Daily.      Semaglutide, 2 MG/DOSE, (Ozempic, 2 MG/DOSE,) 8 MG/3ML solution pen-injector Inject 2 mg under the skin into the appropriate area as directed 1 (One) Time Per Week.      Toujeo Max SoloStar 300 UNIT/ML solution pen-injector injection Inject 120 Units under the skin into the appropriate area as directed Daily.       No current facility-administered medications on file prior to visit.        Review of Systems   Constitutional:  Negative for activity change, appetite change, unexpected weight gain and unexpected weight loss.   Eyes:  Negative for visual disturbance.   Respiratory:  Negative for shortness of breath.    Cardiovascular:  Negative for chest pain.   Gastrointestinal:  Negative for abdominal pain, constipation and diarrhea.   Endocrine: Negative for polydipsia and polyuria.   Skin:  Positive for wound. Negative for rash and skin lesions.   Neurological:  Negative for dizziness and numbness.        /64 (BP Location: Right arm, Patient Position: Sitting, Cuff Size: Large Adult)   Pulse 100   Ht 182.9 cm (72\")   Wt 124 kg (272 lb 9.6 oz)   SpO2 97%   BMI 36.97 kg/m²      Physical Exam  Constitutional:       Appearance: Normal appearance.   Cardiovascular:      Rate and Rhythm: Normal rate and regular rhythm.      Pulses:           Dorsalis pedis pulses are 2+ on the right side.        Posterior tibial pulses are 2+ on the right side. "   Pulmonary:      Effort: Pulmonary effort is normal.   Musculoskeletal:         General: Normal range of motion.      Right foot: Deformity (pes planus) present.      Left foot: No deformity.      Right Lower Extremity: (right 5th phalanx)     Left Lower Extremity: Left leg is amputated below ankle.   Feet:      Right foot:      Protective Sensation: 5 sites tested.  2 sites sensed.      Skin integrity: Skin integrity normal. Callus present. No ulcer.      Toenail Condition: Right toenails are abnormally thick.      Left foot:      Skin integrity: Ulcer present.      Comments: Diabetic Foot Exam Performed and Monofilament Test Performed    Left lateral stump with approximately quarter size stage 3 ulceration with surrounding hypertrophy without discharge, erythema, swelling, warmth.   Skin:     General: Skin is warm and dry.   Neurological:      General: No focal deficit present.      Mental Status: He is alert and oriented to person, place, and time.   Psychiatric:         Mood and Affect: Mood normal.         Behavior: Behavior normal.         LABS AND IMAGING  CMP:  Lab Results   Component Value Date    BUN 14 03/18/2024    CREATININE 1.19 03/18/2024    EGFR 71.2 03/18/2024    BCR 11.8 03/18/2024     (L) 03/18/2024    K 4.2 03/18/2024    CO2 22.1 03/18/2024    CALCIUM 8.0 (L) 03/18/2024    ALBUMIN 3.7 03/12/2024    BILITOT 0.6 03/12/2024    ALKPHOS 97 03/12/2024    AST 15 03/12/2024    ALT 17 03/12/2024       HbA1c:  Lab Results   Component Value Date    HGBA1C 9.7 (A) 08/20/2024    HGBA1C 9.30 (H) 03/12/2024       Assessment and Plan    Diagnoses and all orders for this visit:    1. Type 2 diabetes mellitus with hyperglycemia, with long-term current use of insulin (Primary)  Assessment & Plan:  Diabetes is not controlled.  A1C 9.7% today.  BG data limited; does not come with log and reported blood sugars inconsistent with current A1c.    Discussed with patient need for improved glucose monitoring and use  of blood sugar log/bring to office visit for safe adjustments with medication.  Encouraged use of CGM, but continues to decline at this time.    Rx: Increase Toujeo 114 units once daily.  Humalog 45 units at meals with CF 2 units:50 mg/dl over 150 before meal or if skipped meal.  Continue Ozempic 2 mg once weekly, Farxiga 10 mg.  History of intolerance with metformin use.  Advised glucagon injection as needed for severe hypoglycemia.    Foot exam performed today; ulceration of stump and neuropathy/callus/onychomycosis right foot; referring to wound clinic; additionally following with podiatry.  Microalbumin up-to-date.  LDL at goal, continue atorvastatin.  BP at goal, continue management per PCP.    Discussed risk for complications with diabetes.  Discussed diet: plate method; Increase lean protein and vegetable intake  Avoid concentrated sugar drinks, such as sodas, and processed carbs including crackers, cookies, cakes  Exercise: Recommend at least 30 minutes of exercise daily, at least 5 days per week. Increase exercise gradually and encouraged strength exercises 2-3 days per week.    Blood Glucose Goal: Blood glucose goal <150 fasting, <180 2 hr postprandial. Encouraged monitoring daily.   Discussed yearly microalbumin, annual eye examinations with Ophthalmology, and foot care.  Discussed medications, side effects and compliance.  Discussed hypoglycemia management and prevention.   Reviewed ‘ABCs’ of diabetes management (respective goals in parentheses):  A1C (<7), blood pressure (<130/80), and cholesterol (LDL <100, if CVD <70).      Orders:  -     POC Glycosylated Hemoglobin (Hb A1C)    2. Pressure ulcer of below knee amputation stump, stage 3  Assessment & Plan:  Left lateral stump with approximately quarter size stage 3 ulceration with surrounding hypertrophy without discharge, erythema, swelling, warmth.   Appears stable without infection.  Advised keep clean and covered with bandage.  Caution signs of  worsening/infection.  Advised further management with wound care to prevent progression assist with healing.  Advise continue follow-up with podiatry and prescriber for prosthetic.              Return in about 6 weeks (around 10/1/2024) for follow-up diabetes. The patient was instructed to contact the clinic with any interval questions or concerns.    Electronically signed by: Kaye Stanton PA-C   Endocrinology    Please note that portions of this note were completed with a voice recognition program.

## 2024-08-20 NOTE — ASSESSMENT & PLAN NOTE
Diabetes is not controlled.  A1C 9.7% today.  BG data limited; does not come with log and reported blood sugars inconsistent with current A1c.    Discussed with patient need for improved glucose monitoring and use of blood sugar log/bring to office visit for safe adjustments with medication.  Encouraged use of CGM, but continues to decline at this time.    Rx: Increase Toujeo 114 units once daily.  Humalog 45 units at meals with CF 2 units:50 mg/dl over 150 before meal or if skipped meal.  Continue Ozempic 2 mg once weekly, Farxiga 10 mg.  History of intolerance with metformin use.  Advised glucagon injection as needed for severe hypoglycemia.    Foot exam performed today; ulceration of stump and neuropathy/callus/onychomycosis right foot; referring to wound clinic; additionally following with podiatry.  Microalbumin up-to-date.  LDL at goal, continue atorvastatin.  BP at goal, continue management per PCP.    Discussed risk for complications with diabetes.  Discussed diet: plate method; Increase lean protein and vegetable intake  Avoid concentrated sugar drinks, such as sodas, and processed carbs including crackers, cookies, cakes  Exercise: Recommend at least 30 minutes of exercise daily, at least 5 days per week. Increase exercise gradually and encouraged strength exercises 2-3 days per week.    Blood Glucose Goal: Blood glucose goal <150 fasting, <180 2 hr postprandial. Encouraged monitoring daily.   Discussed yearly microalbumin, annual eye examinations with Ophthalmology, and foot care.  Discussed medications, side effects and compliance.  Discussed hypoglycemia management and prevention.   Reviewed ‘ABCs’ of diabetes management (respective goals in parentheses):  A1C (<7), blood pressure (<130/80), and cholesterol (LDL <100, if CVD <70).

## 2024-09-06 ENCOUNTER — APPOINTMENT (OUTPATIENT)
Dept: CT IMAGING | Facility: HOSPITAL | Age: 59
End: 2024-09-06
Payer: MEDICARE

## 2024-09-06 ENCOUNTER — HOSPITAL ENCOUNTER (EMERGENCY)
Facility: HOSPITAL | Age: 59
Discharge: OTHER INSTITUTION | End: 2024-09-06
Attending: STUDENT IN AN ORGANIZED HEALTH CARE EDUCATION/TRAINING PROGRAM
Payer: MEDICARE

## 2024-09-06 VITALS
HEART RATE: 106 BPM | HEIGHT: 72 IN | DIASTOLIC BLOOD PRESSURE: 103 MMHG | RESPIRATION RATE: 20 BRPM | OXYGEN SATURATION: 96 % | BODY MASS INDEX: 36.84 KG/M2 | SYSTOLIC BLOOD PRESSURE: 193 MMHG | TEMPERATURE: 98.1 F | WEIGHT: 272 LBS

## 2024-09-06 DIAGNOSIS — I10 HYPERTENSION, UNSPECIFIED TYPE: ICD-10-CM

## 2024-09-06 DIAGNOSIS — H53.8 BLURRED VISION, LEFT EYE: ICD-10-CM

## 2024-09-06 DIAGNOSIS — H53.132 ACUTE LOSS OF VISION, LEFT: Primary | ICD-10-CM

## 2024-09-06 LAB
ALBUMIN SERPL-MCNC: 3.9 G/DL (ref 3.5–5.2)
ALBUMIN/GLOB SERPL: 1.2 G/DL
ALP SERPL-CCNC: 102 U/L (ref 39–117)
ALT SERPL W P-5'-P-CCNC: <5 U/L (ref 1–41)
ANION GAP SERPL CALCULATED.3IONS-SCNC: 15.9 MMOL/L (ref 5–15)
AST SERPL-CCNC: <5 U/L (ref 1–40)
BASOPHILS # BLD AUTO: 0.07 10*3/MM3 (ref 0–0.2)
BASOPHILS NFR BLD AUTO: 0.6 % (ref 0–1.5)
BILIRUB SERPL-MCNC: 0.4 MG/DL (ref 0–1.2)
BUN SERPL-MCNC: 15 MG/DL (ref 6–20)
BUN/CREAT SERPL: 10.6 (ref 7–25)
CALCIUM SPEC-SCNC: 9.4 MG/DL (ref 8.6–10.5)
CHLORIDE SERPL-SCNC: 102 MMOL/L (ref 98–107)
CO2 SERPL-SCNC: 20.1 MMOL/L (ref 22–29)
CREAT SERPL-MCNC: 1.42 MG/DL (ref 0.76–1.27)
DEPRECATED RDW RBC AUTO: 44.8 FL (ref 37–54)
EGFRCR SERPLBLD CKD-EPI 2021: 57.3 ML/MIN/1.73
EOSINOPHIL # BLD AUTO: 0.16 10*3/MM3 (ref 0–0.4)
EOSINOPHIL NFR BLD AUTO: 1.4 % (ref 0.3–6.2)
ERYTHROCYTE [DISTWIDTH] IN BLOOD BY AUTOMATED COUNT: 14.3 % (ref 12.3–15.4)
GLOBULIN UR ELPH-MCNC: 3.3 GM/DL
GLUCOSE BLDC GLUCOMTR-MCNC: 176 MG/DL (ref 70–130)
GLUCOSE BLDC GLUCOMTR-MCNC: 181 MG/DL (ref 70–130)
GLUCOSE SERPL-MCNC: 207 MG/DL (ref 65–99)
HBA1C MFR BLD: 9.7 % (ref 4.8–5.6)
HCT VFR BLD AUTO: 42.6 % (ref 37.5–51)
HGB BLD-MCNC: 14.8 G/DL (ref 13–17.7)
HOLD SPECIMEN: NORMAL
IMM GRANULOCYTES # BLD AUTO: 0.08 10*3/MM3 (ref 0–0.05)
IMM GRANULOCYTES NFR BLD AUTO: 0.7 % (ref 0–0.5)
LYMPHOCYTES # BLD AUTO: 2.54 10*3/MM3 (ref 0.7–3.1)
LYMPHOCYTES NFR BLD AUTO: 21.5 % (ref 19.6–45.3)
MCH RBC QN AUTO: 29.8 PG (ref 26.6–33)
MCHC RBC AUTO-ENTMCNC: 34.7 G/DL (ref 31.5–35.7)
MCV RBC AUTO: 85.9 FL (ref 79–97)
MONOCYTES # BLD AUTO: 0.8 10*3/MM3 (ref 0.1–0.9)
MONOCYTES NFR BLD AUTO: 6.8 % (ref 5–12)
NEUTROPHILS NFR BLD AUTO: 69 % (ref 42.7–76)
NEUTROPHILS NFR BLD AUTO: 8.18 10*3/MM3 (ref 1.7–7)
NRBC BLD AUTO-RTO: 0 /100 WBC (ref 0–0.2)
PLATELET # BLD AUTO: 209 10*3/MM3 (ref 140–450)
PMV BLD AUTO: 11.4 FL (ref 6–12)
POTASSIUM SERPL-SCNC: 4.1 MMOL/L (ref 3.5–5.2)
PROT SERPL-MCNC: 7.2 G/DL (ref 6–8.5)
RBC # BLD AUTO: 4.96 10*6/MM3 (ref 4.14–5.8)
SODIUM SERPL-SCNC: 138 MMOL/L (ref 136–145)
WBC NRBC COR # BLD AUTO: 11.83 10*3/MM3 (ref 3.4–10.8)
WHOLE BLOOD HOLD COAG: NORMAL
WHOLE BLOOD HOLD SPECIMEN: NORMAL

## 2024-09-06 PROCEDURE — 85025 COMPLETE CBC W/AUTO DIFF WBC: CPT | Performed by: STUDENT IN AN ORGANIZED HEALTH CARE EDUCATION/TRAINING PROGRAM

## 2024-09-06 PROCEDURE — 99285 EMERGENCY DEPT VISIT HI MDM: CPT

## 2024-09-06 PROCEDURE — 80053 COMPREHEN METABOLIC PANEL: CPT | Performed by: STUDENT IN AN ORGANIZED HEALTH CARE EDUCATION/TRAINING PROGRAM

## 2024-09-06 PROCEDURE — 83036 HEMOGLOBIN GLYCOSYLATED A1C: CPT | Performed by: STUDENT IN AN ORGANIZED HEALTH CARE EDUCATION/TRAINING PROGRAM

## 2024-09-06 PROCEDURE — 82948 REAGENT STRIP/BLOOD GLUCOSE: CPT

## 2024-09-06 RX ORDER — TETRACAINE HYDROCHLORIDE 5 MG/ML
2 SOLUTION OPHTHALMIC ONCE
Status: COMPLETED | OUTPATIENT
Start: 2024-09-06 | End: 2024-09-06

## 2024-09-06 RX ORDER — SODIUM CHLORIDE 0.9 % (FLUSH) 0.9 %
10 SYRINGE (ML) INJECTION AS NEEDED
Status: DISCONTINUED | OUTPATIENT
Start: 2024-09-06 | End: 2024-09-07 | Stop reason: HOSPADM

## 2024-09-06 RX ADMIN — FLUORESCEIN SODIUM 1 STRIP: 1 STRIP OPHTHALMIC at 21:42

## 2024-09-06 RX ADMIN — TETRACAINE HYDROCHLORIDE 2 DROP: 5 SOLUTION OPHTHALMIC at 21:42

## 2024-09-07 NOTE — ED NOTES
Patient states vision is blurry when glasses are off. When assessing patient, told patient to leave glasses on and verbalize how many fingers I was holding up. Patient was able to answer correctly with both eyes open. With only left eye open, patient states vision is blurry and cannot answer correctly. Patient also states he is visualizing floaters in his left eye which is new as of today.

## 2024-09-07 NOTE — ED NOTES
Report called to VICKIE Fabian @  ED for transfer. Patient alert, oriented, and stable at time of transfer. Patient going with saline locked 20g PIV in L arm. VSS during transfer.

## 2024-09-07 NOTE — ED PROVIDER NOTES
EMERGENCY DEPARTMENT ENCOUNTER    Pt Name: Deep Mendez  MRN: [id number]  Pt :   1965  Room Number:    Date of encounter:  2024  PCP: Sami Fisher MD  ED Provider: Lucius Iraheta MD    Historian: Patient      HPI:  Chief Complaint: Blurred vision        Context: Deep Mendez is a 59 y.o. male who presents to the ED c/o blurred vision.  Patient states he woke up this morning with blurred vision out of his left eye.  States he is experiencing a lot of floaters in his left eye.  Denies any pain in eyes.  States symptoms have been present all day but finally decided to come to the emergency department tonight.  Denies any falls or trauma.      PAST MEDICAL HISTORY  Past Medical History:   Diagnosis Date    Acute appendicitis with perforation, localized peritonitis, abscess, and gangrene 2024    Diabetes mellitus     Hypertension     Impaired mobility     Rupture of appendix 2024    Type 2 diabetes mellitus          PAST SURGICAL HISTORY  Past Surgical History:   Procedure Laterality Date    APPENDECTOMY N/A 3/13/2024    Procedure: APPENDECTOMY LAPAROSCOPIC;  Surgeon: Yazmin Zayas MD;  Location: [facility];  Service: General;  Laterality: N/A;    BELOW KNEE LEG AMPUTATION      EYE SURGERY           FAMILY HISTORY  Family History   Problem Relation Age of Onset    Diabetes Mother     Diabetes Father     Diabetes Sister          SOCIAL HISTORY  Social History     Socioeconomic History    Marital status: Single   Tobacco Use    Smoking status: Former     Types: Cigarettes    Smokeless tobacco: Never   Vaping Use    Vaping status: Never Used   Substance and Sexual Activity    Alcohol use: Never    Drug use: Never    Sexual activity: Defer         ALLERGIES  Duloxetine hcl        REVIEW OF SYSTEMS  Review of Systems     All systems reviewed and negative except for those discussed in HPI.       PHYSICAL EXAM    I have reviewed the triage vital signs and nursing notes.    ED Triage  Vitals [09/06/24 2033]   Temp Heart Rate Resp BP SpO2   98.1 °F (36.7 °C) 112 20 166/98 96 %      Temp src Heart Rate Source Patient Position BP Location FiO2 (%)   Oral Monitor Sitting Left arm --       Physical Exam    General:  Awake, alert, no acute distress  HEENT: Atraumatic, normocephalic, EOMI, PERRLA, mucous membranes moist  NECK:  Supple, atraumatic  Cardiovascular:  Regular rate, regular rhythm, no murmurs, rubs, or gallops.  Extremities well perfused   Respiratory:  Regular rate, clear lungs to auscultation bilaterally.  No rhonchi, rales, wheezing  Abdominal:  Soft, nondistended, nontender.  No guarding or rebound.  No palpable masses  Extremity:  No visible bony abnormalities in all 4 extremities.  Full range of motion of all extremities.  Skin:  Warm and dry.  No rashes  Neuro:  AAOx3, GCS 15. Cranial nerves 2-12 grossly intact.  No focal strength or sensation deficits.  Psych:  Mood and affect appropriate.        LAB RESULTS  Recent Results (from the past 24 hour(s))   POC Glucose Once    Collection Time: 09/06/24  8:31 PM    Specimen: Blood   Result Value Ref Range    Glucose 176 (H) 70 - 130 mg/dL   Green Top (Gel)    Collection Time: 09/06/24  8:51 PM   Result Value Ref Range    Extra Tube Hold for add-ons.    Lavender Top    Collection Time: 09/06/24  8:51 PM   Result Value Ref Range    Extra Tube hold for add-on    Light Blue Top    Collection Time: 09/06/24  8:51 PM   Result Value Ref Range    Extra Tube Hold for add-ons.    Comprehensive Metabolic Panel    Collection Time: 09/06/24  8:51 PM    Specimen: Blood   Result Value Ref Range    Glucose 207 (H) 65 - 99 mg/dL    BUN 15 6 - 20 mg/dL    Creatinine 1.42 (H) 0.76 - 1.27 mg/dL    Sodium 138 136 - 145 mmol/L    Potassium 4.1 3.5 - 5.2 mmol/L    Chloride 102 98 - 107 mmol/L    CO2 20.1 (L) 22.0 - 29.0 mmol/L    Calcium 9.4 8.6 - 10.5 mg/dL    Total Protein 7.2 6.0 - 8.5 g/dL    Albumin 3.9 3.5 - 5.2 g/dL    ALT (SGPT) <5 1 - 41 U/L    AST  (SGOT) <5 1 - 40 U/L    Alkaline Phosphatase 102 39 - 117 U/L    Total Bilirubin 0.4 0.0 - 1.2 mg/dL    Globulin 3.3 gm/dL    A/G Ratio 1.2 g/dL    BUN/Creatinine Ratio 10.6 7.0 - 25.0    Anion Gap 15.9 (H) 5.0 - 15.0 mmol/L    eGFR 57.3 (L) >60.0 mL/min/1.73   Hemoglobin A1c    Collection Time: 09/06/24  8:51 PM    Specimen: Blood   Result Value Ref Range    Hemoglobin A1C 9.70 (H) 4.80 - 5.60 %   CBC Auto Differential    Collection Time: 09/06/24  8:51 PM    Specimen: Blood   Result Value Ref Range    WBC 11.83 (H) 3.40 - 10.80 10*3/mm3    RBC 4.96 4.14 - 5.80 10*6/mm3    Hemoglobin 14.8 13.0 - 17.7 g/dL    Hematocrit 42.6 37.5 - 51.0 %    MCV 85.9 79.0 - 97.0 fL    MCH 29.8 26.6 - 33.0 pg    MCHC 34.7 31.5 - 35.7 g/dL    RDW 14.3 12.3 - 15.4 %    RDW-SD 44.8 37.0 - 54.0 fl    MPV 11.4 6.0 - 12.0 fL    Platelets 209 140 - 450 10*3/mm3    Neutrophil % 69.0 42.7 - 76.0 %    Lymphocyte % 21.5 19.6 - 45.3 %    Monocyte % 6.8 5.0 - 12.0 %    Eosinophil % 1.4 0.3 - 6.2 %    Basophil % 0.6 0.0 - 1.5 %    Immature Grans % 0.7 (H) 0.0 - 0.5 %    Neutrophils, Absolute 8.18 (H) 1.70 - 7.00 10*3/mm3    Lymphocytes, Absolute 2.54 0.70 - 3.10 10*3/mm3    Monocytes, Absolute 0.80 0.10 - 0.90 10*3/mm3    Eosinophils, Absolute 0.16 0.00 - 0.40 10*3/mm3    Basophils, Absolute 0.07 0.00 - 0.20 10*3/mm3    Immature Grans, Absolute 0.08 (H) 0.00 - 0.05 10*3/mm3    nRBC 0.0 0.0 - 0.2 /100 WBC   POC Glucose Once    Collection Time: 09/06/24  8:55 PM    Specimen: Blood   Result Value Ref Range    Glucose 181 (H) 70 - 130 mg/dL       If labs were ordered, I independently evaluated the results and considered them in treating the patient.        RADIOLOGY  No Radiology Exams Resulted Within Past 24 Hours    I ordered and independently evaluated the above noted radiographic studies.     See radiologist's dictation for official interpretation.        PROCEDURES    Procedures    No orders to display       MEDICATIONS GIVEN IN ER    Medications    sodium chloride 0.9 % flush 10 mL (has no administration in time range)   fluorescein ophthalmic strip 1 strip (1 strip Both Eyes Given 9/6/24 2142)   tetracaine (ALTACAINE) 0.5 % ophthalmic solution 2 drop (2 drops Both Eyes Given 9/6/24 2142)         MEDICAL DECISION MAKING, PROGRESS, and CONSULTS    All labs, if obtained, have been independently reviewed by me.  All radiology studies, if obtained, have been evaluated by me and the radiologist dictating the report.  All EKG's, if obtained, have been independently viewed and interpreted by me.      Discussion below represents my analysis of pertinent findings related to patient's condition, differential diagnosis, treatment plan and final disposition.    Deep Mendez is a 59 y.o. male who presents to the ED c/o  blurred vision.  Mildly hypertensive on arrival with blood pressure 166/98 and tachycardic but otherwise hemodynamic stable nontoxic in appearance.  Differential includes but is not limited to retinal detachment, diabetic retinopathy, glaucoma, central retinal artery occlusion, retinal venous occlusion.    Visual acuity testing performed and showed 20/200 left eye and 20/30 from right eye.    Pressures tested in both eyes and showed mildly elevated pressures in both eyes with pressure of 20 in the right eye and 20-25 in the left eye.    Labs show mild leukocytosis.  Mild hyperglycemia around 180.  Elevated A1c above 9.    Discussed patient's case with transfer center at  along with ophthalmology at  who recommended transfer to their facility for further treatment.                                   Orders placed during this visit:  Orders Placed This Encounter   Procedures    CT Head Without Contrast    [place] Draw    Comprehensive Metabolic Panel    Hemoglobin A1c    CBC Auto Differential    Visual acuity screening    POC Glucose Once    POC Glucose Once    Insert peripheral IV    Green Top (Gel)    Lavender Top    Gold Top - SST    Light Blue Top     CBC & Differential         ED Course:    Consultants:                  Shared Decision Making:  After my consideration of clinical presentation and any laboratory/radiology studies obtained, I discussed the findings with the patient/patient representative who is in agreement with the treatment plan and the final disposition.   Risks and benefits of discharge and/or observation/admission were discussed.      AS OF 21:45 EDT VITALS:    BP - 175/94  HR - 107  TEMP - 98.1 °F (36.7 °C) (Oral)  O2 SATS - 97%                  DIAGNOSIS  Final diagnoses:   Acute loss of vision, left   Blurred vision, left eye   Hypertension, unspecified type         DISPOSITION  Transfer      Please note that portions of this document were completed with voice recognition software.        Lucius Iraheta MD  09/07/24 0058

## 2025-05-23 ENCOUNTER — APPOINTMENT (OUTPATIENT)
Dept: CARDIOLOGY | Facility: HOSPITAL | Age: 60
End: 2025-05-23
Payer: MEDICARE

## 2025-05-23 ENCOUNTER — APPOINTMENT (OUTPATIENT)
Dept: GENERAL RADIOLOGY | Facility: HOSPITAL | Age: 60
End: 2025-05-23
Payer: MEDICARE

## 2025-05-23 ENCOUNTER — HOSPITAL ENCOUNTER (OUTPATIENT)
Facility: HOSPITAL | Age: 60
Setting detail: OBSERVATION
Discharge: ANOTHER HOSPITAL | End: 2025-05-23
Attending: STUDENT IN AN ORGANIZED HEALTH CARE EDUCATION/TRAINING PROGRAM | Admitting: INTERNAL MEDICINE
Payer: MEDICARE

## 2025-05-23 VITALS
HEIGHT: 72 IN | BODY MASS INDEX: 37.92 KG/M2 | HEART RATE: 84 BPM | SYSTOLIC BLOOD PRESSURE: 128 MMHG | WEIGHT: 279.98 LBS | DIASTOLIC BLOOD PRESSURE: 70 MMHG | RESPIRATION RATE: 18 BRPM | TEMPERATURE: 97.6 F | OXYGEN SATURATION: 96 %

## 2025-05-23 DIAGNOSIS — R07.9 CHEST PAIN, UNSPECIFIED TYPE: Primary | ICD-10-CM

## 2025-05-23 PROBLEM — I50.9 CHF (CONGESTIVE HEART FAILURE): Status: ACTIVE | Noted: 2025-05-23

## 2025-05-23 PROBLEM — R07.89 CHEST PAIN, ATYPICAL: Status: ACTIVE | Noted: 2025-05-23

## 2025-05-23 PROBLEM — R79.89 ELEVATED TROPONIN: Status: ACTIVE | Noted: 2025-05-23

## 2025-05-23 PROBLEM — N18.31 STAGE 3A CHRONIC KIDNEY DISEASE: Status: ACTIVE | Noted: 2025-05-23

## 2025-05-23 PROBLEM — I21.4 NSTEMI, INITIAL EPISODE OF CARE: Status: ACTIVE | Noted: 2025-05-23

## 2025-05-23 PROBLEM — R07.89 ATYPICAL CHEST PAIN: Status: ACTIVE | Noted: 2025-05-23

## 2025-05-23 LAB
ALBUMIN SERPL-MCNC: 3.7 G/DL (ref 3.5–5.2)
ALBUMIN SERPL-MCNC: 3.8 G/DL (ref 3.5–5.2)
ALBUMIN/GLOB SERPL: 1.2 G/DL
ALBUMIN/GLOB SERPL: 1.2 G/DL
ALP SERPL-CCNC: 81 U/L (ref 39–117)
ALP SERPL-CCNC: 96 U/L (ref 39–117)
ALT SERPL W P-5'-P-CCNC: 15 U/L (ref 1–41)
ALT SERPL W P-5'-P-CCNC: 16 U/L (ref 1–41)
ANION GAP SERPL CALCULATED.3IONS-SCNC: 10.3 MMOL/L (ref 5–15)
ANION GAP SERPL CALCULATED.3IONS-SCNC: 12.5 MMOL/L (ref 5–15)
AORTIC DIMENSIONLESS INDEX: 0.68 (DI)
AST SERPL-CCNC: 15 U/L (ref 1–40)
AST SERPL-CCNC: 19 U/L (ref 1–40)
AV MEAN PRESS GRAD SYS DOP V1V2: 3 MMHG
AV VMAX SYS DOP: 117 CM/SEC
BASOPHILS # BLD AUTO: 0.07 10*3/MM3 (ref 0–0.2)
BASOPHILS NFR BLD AUTO: 0.8 % (ref 0–1.5)
BH CV ECHO MEAS - AO MAX PG: 5.5 MMHG
BH CV ECHO MEAS - AO ROOT DIAM: 3.2 CM
BH CV ECHO MEAS - AO V2 VTI: 23.7 CM
BH CV ECHO MEAS - AVA(I,D): 2.31 CM2
BH CV ECHO MEAS - EDV(CUBED): 119.8 ML
BH CV ECHO MEAS - EDV(MOD-SP4): 164 ML
BH CV ECHO MEAS - EF(MOD-SP4): 47 %
BH CV ECHO MEAS - ESV(CUBED): 40.7 ML
BH CV ECHO MEAS - ESV(MOD-SP4): 86.9 ML
BH CV ECHO MEAS - FS: 30.2 %
BH CV ECHO MEAS - IVS/LVPW: 1 CM
BH CV ECHO MEAS - IVSD: 0.96 CM
BH CV ECHO MEAS - LA DIMENSION: 4.2 CM
BH CV ECHO MEAS - LAT PEAK E' VEL: 9.5 CM/SEC
BH CV ECHO MEAS - LV DIASTOLIC VOL/BSA (35-75): 66.7 CM2
BH CV ECHO MEAS - LV MASS(C)D: 168.3 GRAMS
BH CV ECHO MEAS - LV MAX PG: 2.1 MMHG
BH CV ECHO MEAS - LV MEAN PG: 1 MMHG
BH CV ECHO MEAS - LV SYSTOLIC VOL/BSA (12-30): 35.4 CM2
BH CV ECHO MEAS - LV V1 MAX: 72.4 CM/SEC
BH CV ECHO MEAS - LV V1 VTI: 16.1 CM
BH CV ECHO MEAS - LVIDD: 4.9 CM
BH CV ECHO MEAS - LVIDS: 3.4 CM
BH CV ECHO MEAS - LVOT AREA: 3.4 CM2
BH CV ECHO MEAS - LVOT DIAM: 2.08 CM
BH CV ECHO MEAS - LVPWD: 0.96 CM
BH CV ECHO MEAS - MED PEAK E' VEL: 4.7 CM/SEC
BH CV ECHO MEAS - MV A MAX VEL: 92.1 CM/SEC
BH CV ECHO MEAS - MV DEC SLOPE: 612 CM/SEC2
BH CV ECHO MEAS - MV DEC TIME: 0.16 SEC
BH CV ECHO MEAS - MV E MAX VEL: 97.3 CM/SEC
BH CV ECHO MEAS - MV E/A: 1.06
BH CV ECHO MEAS - MV MAX PG: 4.4 MMHG
BH CV ECHO MEAS - MV MEAN PG: 2 MMHG
BH CV ECHO MEAS - MV V2 VTI: 22.9 CM
BH CV ECHO MEAS - MVA(VTI): 2.39 CM2
BH CV ECHO MEAS - PA ACC TIME: 0.11 SEC
BH CV ECHO MEAS - PA V2 MAX: 74.6 CM/SEC
BH CV ECHO MEAS - RV MAX PG: 1.8 MMHG
BH CV ECHO MEAS - RV V1 MAX: 67.1 CM/SEC
BH CV ECHO MEAS - RV V1 VTI: 12.9 CM
BH CV ECHO MEAS - SV(LVOT): 54.7 ML
BH CV ECHO MEAS - SV(MOD-SP4): 77.1 ML
BH CV ECHO MEAS - SVI(LVOT): 22.3 ML/M2
BH CV ECHO MEAS - SVI(MOD-SP4): 31.4 ML/M2
BH CV ECHO MEAS - TAPSE (>1.6): 1.83 CM
BH CV ECHO MEASUREMENTS AVERAGE E/E' RATIO: 13.7
BH CV XLRA - RV BASE: 2.35 CM
BH CV XLRA - RV LENGTH: 9.1 CM
BH CV XLRA - RV MID: 3.6 CM
BH CV XLRA - TDI S': 11 CM/SEC
BILIRUB SERPL-MCNC: 0.4 MG/DL (ref 0–1.2)
BILIRUB SERPL-MCNC: 0.7 MG/DL (ref 0–1.2)
BUN SERPL-MCNC: 18 MG/DL (ref 6–20)
BUN SERPL-MCNC: 19 MG/DL (ref 6–20)
BUN/CREAT SERPL: 14 (ref 7–25)
BUN/CREAT SERPL: 16.7 (ref 7–25)
CALCIUM SPEC-SCNC: 9 MG/DL (ref 8.6–10.5)
CALCIUM SPEC-SCNC: 9.8 MG/DL (ref 8.6–10.5)
CHLORIDE SERPL-SCNC: 100 MMOL/L (ref 98–107)
CHLORIDE SERPL-SCNC: 101 MMOL/L (ref 98–107)
CHOLEST SERPL-MCNC: 131 MG/DL (ref 0–200)
CO2 SERPL-SCNC: 21.7 MMOL/L (ref 22–29)
CO2 SERPL-SCNC: 22.5 MMOL/L (ref 22–29)
CREAT SERPL-MCNC: 1.08 MG/DL (ref 0.76–1.27)
CREAT SERPL-MCNC: 1.36 MG/DL (ref 0.76–1.27)
D DIMER PPP FEU-MCNC: <0.27 MCGFEU/ML (ref 0–0.58)
DEPRECATED RDW RBC AUTO: 40 FL (ref 37–54)
DEPRECATED RDW RBC AUTO: 40.5 FL (ref 37–54)
EGFRCR SERPLBLD CKD-EPI 2021: 60.3 ML/MIN/1.73
EGFRCR SERPLBLD CKD-EPI 2021: 79.5 ML/MIN/1.73
EOSINOPHIL # BLD AUTO: 0.29 10*3/MM3 (ref 0–0.4)
EOSINOPHIL NFR BLD AUTO: 3.4 % (ref 0.3–6.2)
ERYTHROCYTE [DISTWIDTH] IN BLOOD BY AUTOMATED COUNT: 12.9 % (ref 12.3–15.4)
ERYTHROCYTE [DISTWIDTH] IN BLOOD BY AUTOMATED COUNT: 13.1 % (ref 12.3–15.4)
GEN 5 1HR TROPONIN T REFLEX: 79 NG/L
GLOBULIN UR ELPH-MCNC: 3.2 GM/DL
GLOBULIN UR ELPH-MCNC: 3.2 GM/DL
GLUCOSE BLDC GLUCOMTR-MCNC: 238 MG/DL (ref 70–130)
GLUCOSE BLDC GLUCOMTR-MCNC: 264 MG/DL (ref 70–130)
GLUCOSE BLDC GLUCOMTR-MCNC: 273 MG/DL (ref 70–130)
GLUCOSE SERPL-MCNC: 218 MG/DL (ref 65–99)
GLUCOSE SERPL-MCNC: 285 MG/DL (ref 65–99)
HBA1C MFR BLD: 11.7 % (ref 4.8–5.6)
HCT VFR BLD AUTO: 42.6 % (ref 37.5–51)
HCT VFR BLD AUTO: 43.3 % (ref 37.5–51)
HDLC SERPL-MCNC: 20 MG/DL (ref 40–60)
HGB BLD-MCNC: 14.3 G/DL (ref 13–17.7)
HGB BLD-MCNC: 14.8 G/DL (ref 13–17.7)
HOLD SPECIMEN: NORMAL
HOLD SPECIMEN: NORMAL
IMM GRANULOCYTES # BLD AUTO: 0.04 10*3/MM3 (ref 0–0.05)
IMM GRANULOCYTES NFR BLD AUTO: 0.5 % (ref 0–0.5)
LDLC SERPL CALC-MCNC: 69 MG/DL (ref 0–100)
LDLC/HDLC SERPL: 2.98 {RATIO}
LEFT ATRIUM VOLUME INDEX: 22.2 ML/M2
LV EF 2D ECHO EST: 52 %
LYMPHOCYTES # BLD AUTO: 2.97 10*3/MM3 (ref 0.7–3.1)
LYMPHOCYTES NFR BLD AUTO: 34.6 % (ref 19.6–45.3)
MCH RBC QN AUTO: 28.8 PG (ref 26.6–33)
MCH RBC QN AUTO: 29 PG (ref 26.6–33)
MCHC RBC AUTO-ENTMCNC: 33.6 G/DL (ref 31.5–35.7)
MCHC RBC AUTO-ENTMCNC: 34.2 G/DL (ref 31.5–35.7)
MCV RBC AUTO: 84.4 FL (ref 79–97)
MCV RBC AUTO: 86.4 FL (ref 79–97)
MONOCYTES # BLD AUTO: 0.85 10*3/MM3 (ref 0.1–0.9)
MONOCYTES NFR BLD AUTO: 9.9 % (ref 5–12)
NEUTROPHILS NFR BLD AUTO: 4.36 10*3/MM3 (ref 1.7–7)
NEUTROPHILS NFR BLD AUTO: 50.8 % (ref 42.7–76)
NRBC BLD AUTO-RTO: 0 /100 WBC (ref 0–0.2)
NT-PROBNP SERPL-MCNC: 1230 PG/ML (ref 0–900)
PLATELET # BLD AUTO: 182 10*3/MM3 (ref 140–450)
PLATELET # BLD AUTO: 185 10*3/MM3 (ref 140–450)
PMV BLD AUTO: 10.4 FL (ref 6–12)
PMV BLD AUTO: 10.5 FL (ref 6–12)
POTASSIUM SERPL-SCNC: 4.2 MMOL/L (ref 3.5–5.2)
POTASSIUM SERPL-SCNC: 4.3 MMOL/L (ref 3.5–5.2)
PROT SERPL-MCNC: 6.9 G/DL (ref 6–8.5)
PROT SERPL-MCNC: 7 G/DL (ref 6–8.5)
QT INTERVAL: 370 MS
QTC INTERVAL: 437 MS
RBC # BLD AUTO: 4.93 10*6/MM3 (ref 4.14–5.8)
RBC # BLD AUTO: 5.13 10*6/MM3 (ref 4.14–5.8)
SODIUM SERPL-SCNC: 132 MMOL/L (ref 136–145)
SODIUM SERPL-SCNC: 136 MMOL/L (ref 136–145)
TRIGL SERPL-MCNC: 257 MG/DL (ref 0–150)
TROPONIN T % DELTA: 10
TROPONIN T NUMERIC DELTA: 7 NG/L
TROPONIN T SERPL HS-MCNC: 64 NG/L
TROPONIN T SERPL HS-MCNC: 72 NG/L
VLDLC SERPL-MCNC: 42 MG/DL (ref 5–40)
WBC NRBC COR # BLD AUTO: 8.58 10*3/MM3 (ref 3.4–10.8)
WBC NRBC COR # BLD AUTO: 9.17 10*3/MM3 (ref 3.4–10.8)
WHOLE BLOOD HOLD COAG: NORMAL
WHOLE BLOOD HOLD SPECIMEN: NORMAL

## 2025-05-23 PROCEDURE — 25810000003 SODIUM CHLORIDE 0.9 % SOLUTION: Performed by: INTERNAL MEDICINE

## 2025-05-23 PROCEDURE — 93306 TTE W/DOPPLER COMPLETE: CPT

## 2025-05-23 PROCEDURE — 83880 ASSAY OF NATRIURETIC PEPTIDE: CPT | Performed by: STUDENT IN AN ORGANIZED HEALTH CARE EDUCATION/TRAINING PROGRAM

## 2025-05-23 PROCEDURE — 93005 ELECTROCARDIOGRAM TRACING: CPT | Performed by: INTERNAL MEDICINE

## 2025-05-23 PROCEDURE — 93306 TTE W/DOPPLER COMPLETE: CPT | Performed by: INTERNAL MEDICINE

## 2025-05-23 PROCEDURE — G0378 HOSPITAL OBSERVATION PER HR: HCPCS

## 2025-05-23 PROCEDURE — 99285 EMERGENCY DEPT VISIT HI MDM: CPT | Performed by: STUDENT IN AN ORGANIZED HEALTH CARE EDUCATION/TRAINING PROGRAM

## 2025-05-23 PROCEDURE — 25010000002 FENTANYL CITRATE (PF) 50 MCG/ML SOLUTION: Performed by: INTERNAL MEDICINE

## 2025-05-23 PROCEDURE — 99235 HOSP IP/OBS SAME DATE MOD 70: CPT | Performed by: INTERNAL MEDICINE

## 2025-05-23 PROCEDURE — 99235 HOSP IP/OBS SAME DATE MOD 70: CPT | Performed by: NURSE PRACTITIONER

## 2025-05-23 PROCEDURE — 85025 COMPLETE CBC W/AUTO DIFF WBC: CPT | Performed by: STUDENT IN AN ORGANIZED HEALTH CARE EDUCATION/TRAINING PROGRAM

## 2025-05-23 PROCEDURE — 80053 COMPREHEN METABOLIC PANEL: CPT | Performed by: INTERNAL MEDICINE

## 2025-05-23 PROCEDURE — 93005 ELECTROCARDIOGRAM TRACING: CPT | Performed by: STUDENT IN AN ORGANIZED HEALTH CARE EDUCATION/TRAINING PROGRAM

## 2025-05-23 PROCEDURE — 25010000002 SULFUR HEXAFLUORIDE MICROSPH 60.7-25 MG RECONSTITUTED SUSPENSION: Performed by: FAMILY MEDICINE

## 2025-05-23 PROCEDURE — 25010000002 HEPARIN (PORCINE) PER 1000 UNITS: Performed by: INTERNAL MEDICINE

## 2025-05-23 PROCEDURE — 85379 FIBRIN DEGRADATION QUANT: CPT | Performed by: PHYSICIAN ASSISTANT

## 2025-05-23 PROCEDURE — 99204 OFFICE O/P NEW MOD 45 MIN: CPT | Performed by: INTERNAL MEDICINE

## 2025-05-23 PROCEDURE — C1769 GUIDE WIRE: HCPCS | Performed by: INTERNAL MEDICINE

## 2025-05-23 PROCEDURE — 85027 COMPLETE CBC AUTOMATED: CPT | Performed by: INTERNAL MEDICINE

## 2025-05-23 PROCEDURE — 25010000002 CEFTRIAXONE PER 250 MG: Performed by: NURSE PRACTITIONER

## 2025-05-23 PROCEDURE — 80053 COMPREHEN METABOLIC PANEL: CPT | Performed by: STUDENT IN AN ORGANIZED HEALTH CARE EDUCATION/TRAINING PROGRAM

## 2025-05-23 PROCEDURE — 71045 X-RAY EXAM CHEST 1 VIEW: CPT

## 2025-05-23 PROCEDURE — C1894 INTRO/SHEATH, NON-LASER: HCPCS | Performed by: INTERNAL MEDICINE

## 2025-05-23 PROCEDURE — 82948 REAGENT STRIP/BLOOD GLUCOSE: CPT | Performed by: INTERNAL MEDICINE

## 2025-05-23 PROCEDURE — 63710000001 INSULIN LISPRO (HUMAN) PER 5 UNITS: Performed by: INTERNAL MEDICINE

## 2025-05-23 PROCEDURE — 82948 REAGENT STRIP/BLOOD GLUCOSE: CPT

## 2025-05-23 PROCEDURE — 83036 HEMOGLOBIN GLYCOSYLATED A1C: CPT | Performed by: FAMILY MEDICINE

## 2025-05-23 PROCEDURE — 93454 CORONARY ARTERY ANGIO S&I: CPT | Performed by: INTERNAL MEDICINE

## 2025-05-23 PROCEDURE — C1887 CATHETER, GUIDING: HCPCS | Performed by: INTERNAL MEDICINE

## 2025-05-23 PROCEDURE — 80061 LIPID PANEL: CPT | Performed by: NURSE PRACTITIONER

## 2025-05-23 PROCEDURE — 84484 ASSAY OF TROPONIN QUANT: CPT | Performed by: INTERNAL MEDICINE

## 2025-05-23 PROCEDURE — 25010000002 MIDAZOLAM PER 1MG: Performed by: INTERNAL MEDICINE

## 2025-05-23 PROCEDURE — 25510000001 IOPAMIDOL PER 1 ML: Performed by: INTERNAL MEDICINE

## 2025-05-23 PROCEDURE — 84484 ASSAY OF TROPONIN QUANT: CPT | Performed by: STUDENT IN AN ORGANIZED HEALTH CARE EDUCATION/TRAINING PROGRAM

## 2025-05-23 PROCEDURE — 25010000002 LIDOCAINE 1 % SOLUTION: Performed by: INTERNAL MEDICINE

## 2025-05-23 RX ORDER — NALOXONE HCL 0.4 MG/ML
0.4 VIAL (ML) INJECTION
Status: DISCONTINUED | OUTPATIENT
Start: 2025-05-23 | End: 2025-05-23 | Stop reason: HOSPADM

## 2025-05-23 RX ORDER — FENTANYL CITRATE 50 UG/ML
INJECTION, SOLUTION INTRAMUSCULAR; INTRAVENOUS
Status: DISCONTINUED | OUTPATIENT
Start: 2025-05-23 | End: 2025-05-23 | Stop reason: HOSPADM

## 2025-05-23 RX ORDER — TEMAZEPAM 15 MG/1
15 CAPSULE ORAL NIGHTLY PRN
Status: DISCONTINUED | OUTPATIENT
Start: 2025-05-23 | End: 2025-05-23 | Stop reason: HOSPADM

## 2025-05-23 RX ORDER — SODIUM CHLORIDE 9 MG/ML
100 INJECTION, SOLUTION INTRAVENOUS CONTINUOUS
Status: ACTIVE | OUTPATIENT
Start: 2025-05-23 | End: 2025-05-23

## 2025-05-23 RX ORDER — NITROGLYCERIN 0.4 MG/1
0.4 TABLET SUBLINGUAL
Status: DISCONTINUED | OUTPATIENT
Start: 2025-05-23 | End: 2025-05-23 | Stop reason: HOSPADM

## 2025-05-23 RX ORDER — ACETAMINOPHEN 325 MG/1
650 TABLET ORAL EVERY 4 HOURS PRN
Status: DISCONTINUED | OUTPATIENT
Start: 2025-05-23 | End: 2025-05-23 | Stop reason: HOSPADM

## 2025-05-23 RX ORDER — IOPAMIDOL 755 MG/ML
INJECTION, SOLUTION INTRAVASCULAR
Status: DISCONTINUED | OUTPATIENT
Start: 2025-05-23 | End: 2025-05-23 | Stop reason: HOSPADM

## 2025-05-23 RX ORDER — BISACODYL 5 MG/1
5 TABLET, DELAYED RELEASE ORAL DAILY PRN
Status: DISCONTINUED | OUTPATIENT
Start: 2025-05-23 | End: 2025-05-23 | Stop reason: HOSPADM

## 2025-05-23 RX ORDER — BISACODYL 10 MG
10 SUPPOSITORY, RECTAL RECTAL DAILY PRN
Status: DISCONTINUED | OUTPATIENT
Start: 2025-05-23 | End: 2025-05-23 | Stop reason: HOSPADM

## 2025-05-23 RX ORDER — SENNOSIDES 8.6 MG
325 CAPSULE ORAL DAILY
Start: 2025-05-23

## 2025-05-23 RX ORDER — DEXTROSE MONOHYDRATE 25 G/50ML
10-50 INJECTION, SOLUTION INTRAVENOUS
Status: DISCONTINUED | OUTPATIENT
Start: 2025-05-23 | End: 2025-05-23 | Stop reason: HOSPADM

## 2025-05-23 RX ORDER — HEPARIN SODIUM 1000 [USP'U]/ML
INJECTION, SOLUTION INTRAVENOUS; SUBCUTANEOUS
Status: DISCONTINUED | OUTPATIENT
Start: 2025-05-23 | End: 2025-05-23 | Stop reason: HOSPADM

## 2025-05-23 RX ORDER — MORPHINE SULFATE 2 MG/ML
4 INJECTION, SOLUTION INTRAMUSCULAR; INTRAVENOUS
Status: DISCONTINUED | OUTPATIENT
Start: 2025-05-23 | End: 2025-05-23 | Stop reason: HOSPADM

## 2025-05-23 RX ORDER — ATORVASTATIN CALCIUM 40 MG/1
40 TABLET, FILM COATED ORAL DAILY
Status: DISCONTINUED | OUTPATIENT
Start: 2025-05-23 | End: 2025-05-23

## 2025-05-23 RX ORDER — LIDOCAINE HYDROCHLORIDE 10 MG/ML
INJECTION, SOLUTION INFILTRATION; PERINEURAL
Status: DISCONTINUED | OUTPATIENT
Start: 2025-05-23 | End: 2025-05-23 | Stop reason: HOSPADM

## 2025-05-23 RX ORDER — NITROGLYCERIN 0.4 MG/1
0.4 TABLET SUBLINGUAL
Status: DISCONTINUED | OUTPATIENT
Start: 2025-05-23 | End: 2025-05-23

## 2025-05-23 RX ORDER — ATORVASTATIN CALCIUM 80 MG/1
80 TABLET, FILM COATED ORAL DAILY
Start: 2025-05-24

## 2025-05-23 RX ORDER — MIDAZOLAM HYDROCHLORIDE 2 MG/2ML
INJECTION, SOLUTION INTRAMUSCULAR; INTRAVENOUS
Status: DISCONTINUED | OUTPATIENT
Start: 2025-05-23 | End: 2025-05-23 | Stop reason: HOSPADM

## 2025-05-23 RX ORDER — SENNOSIDES 8.6 MG
325 CAPSULE ORAL DAILY
Status: DISCONTINUED | OUTPATIENT
Start: 2025-05-23 | End: 2025-05-23 | Stop reason: HOSPADM

## 2025-05-23 RX ORDER — ONDANSETRON 2 MG/ML
4 INJECTION INTRAMUSCULAR; INTRAVENOUS EVERY 6 HOURS PRN
Status: DISCONTINUED | OUTPATIENT
Start: 2025-05-23 | End: 2025-05-23 | Stop reason: HOSPADM

## 2025-05-23 RX ORDER — INSULIN LISPRO 100 [IU]/ML
1-200 INJECTION, SOLUTION INTRAVENOUS; SUBCUTANEOUS AS NEEDED
Status: DISCONTINUED | OUTPATIENT
Start: 2025-05-23 | End: 2025-05-23 | Stop reason: HOSPADM

## 2025-05-23 RX ORDER — INSULIN LISPRO 100 [IU]/ML
1-200 INJECTION, SOLUTION INTRAVENOUS; SUBCUTANEOUS
Status: DISCONTINUED | OUTPATIENT
Start: 2025-05-23 | End: 2025-05-23 | Stop reason: HOSPADM

## 2025-05-23 RX ORDER — VERAPAMIL HYDROCHLORIDE 2.5 MG/ML
INJECTION INTRAVENOUS
Status: DISCONTINUED | OUTPATIENT
Start: 2025-05-23 | End: 2025-05-23 | Stop reason: HOSPADM

## 2025-05-23 RX ORDER — AMOXICILLIN 250 MG
2 CAPSULE ORAL 2 TIMES DAILY
Status: DISCONTINUED | OUTPATIENT
Start: 2025-05-23 | End: 2025-05-23 | Stop reason: HOSPADM

## 2025-05-23 RX ORDER — POLYETHYLENE GLYCOL 3350 17 G/17G
17 POWDER, FOR SOLUTION ORAL DAILY PRN
Status: DISCONTINUED | OUTPATIENT
Start: 2025-05-23 | End: 2025-05-23 | Stop reason: HOSPADM

## 2025-05-23 RX ORDER — NICOTINE POLACRILEX 4 MG
15 LOZENGE BUCCAL
Status: DISCONTINUED | OUTPATIENT
Start: 2025-05-23 | End: 2025-05-23 | Stop reason: HOSPADM

## 2025-05-23 RX ORDER — SODIUM CHLORIDE 9 MG/ML
INJECTION, SOLUTION INTRAVENOUS
Status: COMPLETED | OUTPATIENT
Start: 2025-05-23 | End: 2025-05-23

## 2025-05-23 RX ORDER — IBUPROFEN 600 MG/1
1 TABLET ORAL
Status: DISCONTINUED | OUTPATIENT
Start: 2025-05-23 | End: 2025-05-23 | Stop reason: HOSPADM

## 2025-05-23 RX ORDER — ATORVASTATIN CALCIUM 80 MG/1
80 TABLET, FILM COATED ORAL DAILY
Status: DISCONTINUED | OUTPATIENT
Start: 2025-05-24 | End: 2025-05-23 | Stop reason: HOSPADM

## 2025-05-23 RX ORDER — DIPHENHYDRAMINE HYDROCHLORIDE 50 MG/ML
25 INJECTION, SOLUTION INTRAMUSCULAR; INTRAVENOUS EVERY 6 HOURS PRN
Status: DISCONTINUED | OUTPATIENT
Start: 2025-05-23 | End: 2025-05-23 | Stop reason: HOSPADM

## 2025-05-23 RX ORDER — FUROSEMIDE 40 MG/1
40 TABLET ORAL NIGHTLY PRN
Status: DISCONTINUED | OUTPATIENT
Start: 2025-05-23 | End: 2025-05-23 | Stop reason: HOSPADM

## 2025-05-23 RX ORDER — CALCIUM CARBONATE 500 MG/1
2 TABLET, CHEWABLE ORAL 2 TIMES DAILY PRN
Status: DISCONTINUED | OUTPATIENT
Start: 2025-05-23 | End: 2025-05-23 | Stop reason: HOSPADM

## 2025-05-23 RX ORDER — AMOXICILLIN 250 MG
2 CAPSULE ORAL 2 TIMES DAILY PRN
Status: DISCONTINUED | OUTPATIENT
Start: 2025-05-23 | End: 2025-05-23 | Stop reason: HOSPADM

## 2025-05-23 RX ORDER — HYDROCODONE BITARTRATE AND ACETAMINOPHEN 5; 325 MG/1; MG/1
1 TABLET ORAL EVERY 4 HOURS PRN
Status: DISCONTINUED | OUTPATIENT
Start: 2025-05-23 | End: 2025-05-23 | Stop reason: HOSPADM

## 2025-05-23 RX ORDER — SODIUM CHLORIDE 0.9 % (FLUSH) 0.9 %
10 SYRINGE (ML) INJECTION AS NEEDED
Status: DISCONTINUED | OUTPATIENT
Start: 2025-05-23 | End: 2025-05-23 | Stop reason: HOSPADM

## 2025-05-23 RX ORDER — ONDANSETRON 4 MG/1
4 TABLET, ORALLY DISINTEGRATING ORAL EVERY 6 HOURS PRN
Status: DISCONTINUED | OUTPATIENT
Start: 2025-05-23 | End: 2025-05-23 | Stop reason: HOSPADM

## 2025-05-23 RX ORDER — ALPRAZOLAM 0.25 MG
0.25 TABLET ORAL 3 TIMES DAILY PRN
Status: DISCONTINUED | OUTPATIENT
Start: 2025-05-23 | End: 2025-05-23 | Stop reason: HOSPADM

## 2025-05-23 RX ADMIN — CEFTRIAXONE 2000 MG: 2 INJECTION, POWDER, FOR SOLUTION INTRAMUSCULAR; INTRAVENOUS at 14:31

## 2025-05-23 RX ADMIN — INSULIN LISPRO 5 UNITS: 100 INJECTION, SOLUTION INTRAVENOUS; SUBCUTANEOUS at 08:58

## 2025-05-23 RX ADMIN — SENNOSIDES, DOCUSATE SODIUM 2 TABLET: 50; 8.6 TABLET, FILM COATED ORAL at 08:57

## 2025-05-23 RX ADMIN — INSULIN LISPRO 4 UNITS: 100 INJECTION, SOLUTION INTRAVENOUS; SUBCUTANEOUS at 12:14

## 2025-05-23 RX ADMIN — SODIUM CHLORIDE 100 ML/HR: 0.9 INJECTION, SOLUTION INTRAVENOUS at 14:30

## 2025-05-23 RX ADMIN — SULFUR HEXAFLUORIDE 3 ML: KIT at 09:01

## 2025-05-23 RX ADMIN — INSULIN LISPRO 9 UNITS: 100 INJECTION, SOLUTION INTRAVENOUS; SUBCUTANEOUS at 17:00

## 2025-05-23 RX ADMIN — NITROGLYCERIN 0.4 MG: 0.4 TABLET SUBLINGUAL at 04:14

## 2025-05-23 RX ADMIN — ASPIRIN 325 MG: 325 TABLET, COATED ORAL at 14:30

## 2025-05-23 RX ADMIN — ATORVASTATIN CALCIUM 40 MG: 40 TABLET, FILM COATED ORAL at 08:58

## 2025-05-23 NOTE — DISCHARGE SUMMARY
[facility]   DISCHARGE SUMMARY      Name:  Deep Mendez   Age:  59 y.o.  Sex:  male  :  1965  MRN:  [id number]   Visit Number:  [id number]    Admission Date:  2025  Date of Discharge:  2025  Primary Care Physician:  Sami Fisher MD    Important issues to note:    -Patient admitted with chest pain with chronically elevated troponins above baseline.  Taken for cardiac catheterization which noted severe three-vessel disease.  - Patient accepted at [facility] under the care of CT surgeon Dr. Hinton pending bed availability.    Discharge Diagnoses:     Chest pain status post cardiac catheterization with severe three-vessel disease  Right antecubital cellulitis/right lower extremity cellulitis  Diabetes mellitus type 2 uncontrolled  Hyperlipidemia  Chronic heart failure  Chronic kidney disease stage IIIa  Essential hypertension    Problem List:     Active Hospital Problems    Diagnosis  POA    **NSTEMI, initial episode of care [I21.4]  Yes    Chest pain, atypical [R07.89]  Yes    Elevated troponin [R79.89]  Yes    Stage 3a chronic kidney disease [N18.31]  Yes    CHF (congestive heart failure) [I50.9]  Yes    Atypical chest pain [R07.89]  Yes    Chest pain [R07.9]  Yes    Type 2 diabetes mellitus with hyperglycemia, with long-term current use of insulin [E11.65, Z79.4]  Not Applicable    Essential hypertension [I10]  Yes      Resolved Hospital Problems   No resolved problems to display.     Presenting Problem:    Chief Complaint   Patient presents with    Chest Pain    Shortness of Breath      Consults:     Consulting Physician(s)         Provider   Role Specialty     Bradley Wade MD      Consulting Physician Cardiology          Procedures Performed:    Procedure(s):  Coronary angiography    History of presenting illness/Hospital Course:    Mr. 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  Intermittent right sided associated with some mild shortness of air.  Patient mostly wheelchair-bound, pain improved with nitroglycerin.    Upon ED presentation patient hemodynamically stable. Pertinent findings: EKG shows sinus rhythm with nonspecific T wave changes, initial troponin 70 2 repeat 79 with repeat 64, creatinine 1.36 which is at baseline, glucose 218, D-dimer negative, CBC nonactionable, CXR no focal infiltrate or effusion, EKG NSR nonspecific t wave changes.  Hospitalist consulted for further medical management.  Patient taken for cardiac catheterization per Dr. Wilson cardiologist noting severe three-vessel disease.  Dr. Wilson advised optimizing medical therapy, high intensity statin therapy, aspirin therapy indefinitely, surgical consult for revascularization/CABG.  Patient current A1c 11.7 with lipid panel pending.  Gentle fluids post catheterization as patient does have stage III kidney disease.     Patient also noted to have right antecubital cellulitis with no known IV/abrasion/injury.  He does also appear to have peripheral arterial disease right lower extremity with chronic ulcerations which do appear to have some surrounding erythema as well.  Wound care consulted, patient initiated on Rocephin.    Vital Signs:    Temp:  [97.8 °F (36.6 °C)-98.5 °F (36.9 °C)] 98.2 °F (36.8 °C)  Heart Rate:  [82-95] 82  Resp:  [16-18] 17  BP: (103-165)/() 104/58    Physical Exam:    General Appearance:  Alert and cooperative.  Chronically ill middle-age male.  Poor historian.   Head:  Atraumatic and normocephalic.   Eyes: Conjunctivae and sclerae normal, no icterus. No pallor.   Ears:  Ears with no abnormalities noted.    Throat: No oral lesions, no thrush, oral mucosa moist.   Neck: Supple, trachea midline, no thyromegaly.   Back:   No kyphoscoliosis present. No tenderness to palpation.   Lungs:   Breath sounds heard bilaterally equally.  No crackles or wheezing. No Pleural rub or bronchial breathing.  Unlabored on room air.   Heart:  Normal S1 and S2, no murmur, no gallop, no rub. No JVD.   Abdomen:   Normal bowel sounds, no masses, no organomegaly. Soft, nontender, nondistended, no rebound tenderness.   Extremities: Supple, no edema, no cyanosis, no clubbing.  Left below the knee amputation   Pulses: Pulses palpable bilaterally.   Skin: No bleeding or rash.  Open area callus left stump/right lower extremity chronic venous stasis noted with area of erythema mid calf with surrounding warmth and pustular lesion, likely ulceration from peripheral arterial disease, right dorsal foot likely ulceration from PAD, right antecubital erythema and warmth   Neurologic: Alert and oriented x 3. No facial asymmetry. Moves all four limbs. No tremors.  Severe generalized weakness     Pertinent Lab Results:     Results from last 7 days   Lab Units 05/23/25  0812 05/23/25  0016   SODIUM mmol/L 132* 136   POTASSIUM mmol/L 4.2 4.3   CHLORIDE mmol/L 100 101   CO2 mmol/L 21.7* 22.5   BUN mg/dL 18 19   CREATININE mg/dL 1.08 1.36*   CALCIUM mg/dL 9.0 9.8   BILIRUBIN mg/dL 0.7 0.4   ALK PHOS U/L 81 96   ALT (SGPT) U/L 16 15   AST (SGOT) U/L 19 15   GLUCOSE mg/dL 285* 218*     Results from last 7 days   Lab Units 05/23/25  0813 05/23/25  0016   WBC 10*3/mm3 9.17 8.58   HEMOGLOBIN g/dL 14.8 14.3   HEMATOCRIT % 43.3 42.6   PLATELETS 10*3/mm3 182 185         Results from last 7 days   Lab Units 05/23/25  0812 05/23/25  0139 05/23/25  0016   HSTROP T ng/L 64* 79* 72*     Results from last 7 days   Lab Units 05/23/25  0016   PROBNP pg/mL 1,230.0*                       Pertinent Radiology Results:    Imaging Results (All)       Procedure Component Value Units  Date/Time    XR Chest 1 View [[id number]] Collected: 05/23/25 0813     Updated: 05/23/25 0826    Narrative:      PROCEDURE: XR CHEST 1 VW-     HISTORY: SOA Triage Protocol     COMPARISON: None.     FINDINGS: The heart is normal in size. The mediastinum is unremarkable.  There is decreased inspiratory effort. The lungs are clear. There is no  pneumothorax.  There are no acute osseous abnormalities.       Impression:      No acute cardiopulmonary process.                       Images were reviewed, interpreted, and dictated by Dr. Laurie Braga MD  Transcribed by Jovany Dwyer PA-C.     This report was signed and finalized on 5/23/2025 8:24 AM by Laurie Braga MD.               Echo:    Results for orders placed during the hospital encounter of 05/23/25    Adult Transthoracic Echo Complete W/ Cont if Necessary Per Protocol    Interpretation Summary    Left ventricular systolic function is normal. Estimated left ventricular EF = 52% Left ventricular ejection fraction appears to be 51 - 55%.    Left ventricular diastolic function is consistent with (grade I) impaired relaxation.    Condition on Discharge:      Stable.    Code status during the hospital stay:    Code Status and Medical Interventions: CPR (Attempt to Resuscitate); Full Support   Ordered at: 05/23/25 0248     Code Status (Patient has no pulse and is not breathing):    CPR (Attempt to Resuscitate)     Medical Interventions (Patient has pulse or is breathing):    Full Support     Discharge Disposition:        Discharge Medications:       Discharge Medications        New Medications        Instructions Start Date   aspirin 325 MG EC tablet   325 mg, Oral, Daily      cefTRIAXone 2,000 mg in sodium chloride 0.9 % 100 mL IVPB   2,000 mg, Intravenous, Every 24 Hours             Changes to Medications        Instructions Start Date   atorvastatin 80 MG tablet  Commonly known as: LIPITOR  What changed:   medication strength  how much to take   80 mg, Oral, Daily    Start Date: May 24, 2025            Continue These Medications        Instructions Start Date   Farxiga 10 MG tablet  Generic drug: dapagliflozin Propanediol   1 tablet, Daily      furosemide 40 MG tablet  Commonly known as: LASIX   40 mg, Nightly PRN      glucagon 1 MG injection  Commonly known as: GLUCAGEN   1 mg, Subcutaneous, Once As Needed      Insulin Lispro (1 Unit Dial) 100 UNIT/ML solution pen-injector  Commonly known as: HumaLOG KwikPen   45 Units, Subcutaneous, 3 Times Daily With Meals, Add additional correction 2 unit for every 50 mg/dl over 150. Max daily dose 200 units.      lisinopril 5 MG tablet  Commonly known as: PRINIVIL,ZESTRIL   5 mg, Daily      TIRZEPATIDE SC   Subcutaneous      Toujeo Max SoloStar 300 UNIT/ML solution pen-injector injection  Generic drug: Insulin Glargine (2 Unit Dial)   114 Units, Subcutaneous, Daily, Titrate 2 units every 3 days if fasting blood sugar is more than 140. Max daily dose 120 units.             Stop These Medications      influenza vac split quad 0.5 ML suspension prefilled syringe injection  Commonly known as: FLUZONE,FLUARIX,AFLURIA,FLULAVAL     Ozempic (2 MG/DOSE) 8 MG/3ML solution pen-injector  Generic drug: Semaglutide (2 MG/DOSE)            Discharge Diet:     Diet Instructions       Diet: Cardiac Diets, Diabetic Diets; Healthy Heart (2-3 Na+); Thin (IDDSI 0); Consistent Carbohydrate      Discharge Diet:  Cardiac Diets  Diabetic Diets       Cardiac Diet: Healthy Heart (2-3 Na+)    Fluid Consistency: Thin (IDDSI 0)    Diabetic Diet: Consistent Carbohydrate          Activity at Discharge:     Activity Instructions       Activity as Tolerated            Follow-up Appointments:      No future appointments.  Test Results Pending at Discharge:    Pending Results       Procedure [Order ID] Specimen - Date/Time    Lipid Panel [[id number]]     Specimen: Blood                  Janel Lorenzo, KAILEE  05/23/25  14:27 EDT    Time: I spent >30 minutes on this discharge  activity which included: face-to-face encounter with the patient, reviewing the data in the system, coordination of the care with the nursing staff as well as consultants, documentation, and entering orders.     Dictated utilizing Dragon dictation.

## 2025-05-23 NOTE — CONSULTS
"Diabetes Education  Assessment/Teaching    Patient Name:  Deep Mendez  YOB: 1965  MRN: [id number]  Admit Date:  5/23/2025      Assessment Date:  5/23/2025  Flowsheet Row Most Recent Value   General Information     Height 182.9 cm (72.01\")   Height Method Stated   Weight 127 kg (279 lb 15.8 oz)   Weight Method Stated   Pregnancy Assessment    Diabetes History    What type of diabetes do you have? Type 2   Current DM knowledge --  [pt. is able to tell me his Toujeo and prandial dose of rapid acting insulin.  pt. states that he is not familiar with a corrections dose.  pt. is able to tell me he was changed from Ozempic to Mounjaro]   Have you had diabetes education/teaching in the past? yes   When and where was your diabetes education? pt. has started following in the past month with Ilsa peng for his DM care noted A1c on 04/08/25 was 14.5%.  as of today pt. is 11.7%   Do you test your blood sugar at home? yes   Frequency of checks continuous   Meter type Dexcom G7   Who performs the test? self   Have you had low blood sugar? (<70mg/dl) no   Have you had high blood sugar? (>140mg/dl) yes  [yes, however improving in past month]   How often do you have high blood sugar? frequently   Feeling down, depressed, or hopeless Not at all   Little interest or pleasure in doing things Not at all   Do you have any diabetes complications? amputations, circulation problems   Education Preferences    What areas of diabetes would you like to learn about? avoiding high blood sugar, diabetes complications, diet information, medications for diabetes, testing my blood sugar at home   Nutrition Information    Are you currently following a special meal plan? occasionally  [pt. does have to eat a lot of microwavable meals.  pt. states that he does his won cooking.  pt. occasionally has ice cream,drinks only sugar free beverages.  Ilsa Peng reviewed carb counting and staying around 45 grams per meal and 1 15 gram " snack.]   Assessment Topics    DM Goals             Flowsheet Row Most Recent Value   DM Education Needs    Meter Has own   Meter Type Freestyle  [Magalys 3]   Frequency of Testing --  [continuous]   Blood Glucose Target 140   Blood Glucose Target Range  fasting and 2 hours post meal no higher than 180   Medication Insulin, Actions, Administration  [Noted that pt. would likely be sent home on a correction in addition to his prandial and basal dosing.  pt. was given a 2u:50 >150 correction sheet.  pt. is eating 2-3 meals a day.]   Problem Solving Hyperglycemia, Signs, Symptoms   Healthy Eating Basic meal plan provided   Healthy Coping Appropriate   Discharge Plan --  [pending]   Motivation Engaged  [pt. is motivated ,but sleepy at this time.]   Teaching Method Explanation, Discussion, Handouts, Teach back, Demonstration   Patient Response Verbalized understanding              Other Comments:  DM education related to A1c >7%.  Pt. Admitted with atypical chest pain.  Discussed with pt. That he has recently been seeing new Endocrine provider Ms. Peng.  Discussed that last month his A1c was 14.5% and his A1c today is 11.7% which is a significant improvement.Pt. is able to tell me his dosages of insulin stating that he takes 114 units of Toujeo every day and is not missing doses.  Pt. States that he eats 2-3 meals a day and is taking his Lispro 45 units when he does eat.  Noted that pt. May likely discharge home with a correction dose based on 2u:50 >150.  Explained to pt. That his prandial dose of 45 units was strictly to cover his meal if he eats eat.  However the correction dose is based on his BG and trying to keep it closer to target of 150.  Pt. Was provided with a written 2u:50 >150 scale We discussed and reviewed multiple times that if pt. Was eating or not he needed to check his BG at Breakfast, lunch and Supper .  Discussed if he was not eating to follow the correction scale and if he ate with the meal he  would add his 45 units to the correction scale and take prior to eating.  Pt. May need more reinforcement in the future as he does appear tired. Pt. Has a Freestyle Magalys that he is wearing.  Discussed and did write down his target goals on the top of a glucose log I have asked that pt. Will write down his pre-meal and 2 hours after meal BG for his own awareness to see if he is getting closer to his targets.  Pt. Was provided with my contact information, written information on DM basics and the Healthy Plate Method for more guidance on his diet.        Electronically signed by:  Danica Mustafa RN  05/23/25 12:47 EDT

## 2025-05-23 NOTE — NURSING NOTE
Seen for wound consult. Pleasant and cooperative with assessment. Findings discussed with Marybeth JOHNSTON.   Left stump has a callus that splits and has evidence of previous bleeding. No active bleeding. Orders to Paint with betadine, leave open to air daily. Talked with patient about returning to his prosthetic provider to have adjustments.   Right lower leg has redness 3/4 around calf with an open ulceration that has evidence of dried drainage. Orders to clean with wound cleanser, barrier wipe around wound then place honey and cover with dry dressing every other day.   Right foot has dry red area on top of foot and a callus on medial great toe. Orders to Paint with betadine, leave open to air daily.  Staff to contact provider and re-consult wound nurse for new skin issues or lack of improvement with current orders. Thank you for the consult. If you have questions or concerns do not hesitate to contact me.

## 2025-05-23 NOTE — CONSULTS
"[facility]-Cardiology Consult Note    Referring Provider: Cynthia  Reason for Consultation: NSTEMI    Patient Care Team:  Sami Fisher MD as PCP - General (Internal Medicine)  Provider, No Known as PCP - Family Medicine  Yazmin Zayas MD as Surgeon (General Surgery)  Kaye Stanton PA-C as Physician Assistant (Endocrinology)    Chief complaint : Chest pain    Subjective:    History of present illness: This is a 59-year-old male patient who presents to the emergency room with chest discomfort.  The patient reports having anterior chest pressure without radiation \"all day yesterday\".  The patient reports having a history of \"multiple\" heart attacks but has never had heart catheterization or any form of coronary revascularization.  In the emergency room his twelve-lead electrocardiogram showed sinus rhythm with nonspecific ST-T wave changes.  Cardiac troponins were elevated in a \"plateau-type\" pattern.  His troponins have been elevated in a similar magnitude for the last 12 months.  He has a history of severe type 2 diabetes mellitus with insulin requirement.  He has morbid obesity.  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  He stopped smoking 20 years ago.    Review of Systems   Review of Systems   Constitutional: Negative for chills, diaphoresis, fever, malaise/fatigue, weight gain and weight loss.   HENT:  Negative for ear discharge, hearing loss, hoarse voice and nosebleeds.    Eyes:  Negative for discharge, double vision, pain and photophobia.   Cardiovascular:  Positive for chest pain. Negative for claudication, cyanosis, irregular heartbeat, leg swelling, near-syncope, orthopnea, palpitations, paroxysmal nocturnal dyspnea and syncope.   Respiratory:  Negative for cough, hemoptysis, sputum production and wheezing.    Endocrine: Negative for cold intolerance, heat intolerance, polydipsia, polyphagia and polyuria. "   Hematologic/Lymphatic: Negative for adenopathy and bleeding problem. Does not bruise/bleed easily.   Skin:  Negative for color change, flushing, itching and rash.   Musculoskeletal:  Negative for muscle cramps, muscle weakness, myalgias and stiffness.   Gastrointestinal:  Negative for abdominal pain, diarrhea, hematemesis, hematochezia, nausea and vomiting.   Genitourinary:  Negative for dysuria, frequency and nocturia.   Neurological:  Negative for focal weakness, loss of balance, numbness, paresthesias and seizures.   Psychiatric/Behavioral:  Negative for altered mental status, hallucinations and suicidal ideas.    Allergic/Immunologic: Negative for HIV exposure, hives and persistent infections.       History  Past Medical History:   Diagnosis Date    Acute appendicitis with perforation, localized peritonitis, abscess, and gangrene 03/13/2024    Diabetes mellitus     Hypertension     Impaired mobility     Rupture of appendix 03/14/2024    Type 2 diabetes mellitus    ,   Past Surgical History:   Procedure Laterality Date    APPENDECTOMY N/A 3/13/2024    Procedure: APPENDECTOMY LAPAROSCOPIC;  Surgeon: Yazmin Zayas MD;  Location: [facility];  Service: General;  Laterality: N/A;    BELOW KNEE LEG AMPUTATION      EYE SURGERY     ,   Family History   Problem Relation Age of Onset    Diabetes Mother     Diabetes Father     Diabetes Sister    ,   Social History     Tobacco Use    Smoking status: Former     Types: Cigarettes    Smokeless tobacco: Never   Vaping Use    Vaping status: Never Used   Substance Use Topics    Alcohol use: Never    Drug use: Never   ,   Medications Prior to Admission   Medication Sig Dispense Refill Last Dose/Taking    atorvastatin (LIPITOR) 40 MG tablet Take 1 tablet by mouth Daily.   5/22/2025    dapagliflozin Propanediol (Farxiga) 10 MG tablet Take 10 mg by mouth Daily.   5/22/2025    furosemide (LASIX) 40 MG tablet Take 1 tablet by mouth At Night As Needed.   Past Week    lisinopril  "(PRINIVIL,ZESTRIL) 5 MG tablet Take 1 tablet by mouth Daily.   Past Week    glucagon (GLUCAGEN) 1 MG injection Inject 1 mg under the skin into the appropriate area as directed 1 (One) Time As Needed (severe hypoglycemia) for up to 1 dose. 1 each 0 Unknown    influenza vac split quad (FLUZONE,FLUARIX,AFLURIA,FLULAVAL) 0.5 ML suspension prefilled syringe injection  (Patient not taking: Reported on 5/23/2025)   Not Taking    Insulin Lispro, 1 Unit Dial, (HumaLOG KwikPen) 100 UNIT/ML solution pen-injector Inject 45 Units under the skin into the appropriate area as directed 3 (Three) Times a Day With Meals. Add additional correction 2 unit for every 50 mg/dl over 150. Max daily dose 200 units. 180 mL 0 Unknown    Semaglutide, 2 MG/DOSE, (Ozempic, 2 MG/DOSE,) 8 MG/3ML solution pen-injector Inject 2 mg under the skin into the appropriate area as directed 1 (One) Time Per Week. (Patient not taking: Reported on 5/23/2025)   Not Taking    TIRZEPATIDE SC Inject  under the skin into the appropriate area as directed.       Toujeo Max SoloStar 300 UNIT/ML solution pen-injector injection Inject 114 Units under the skin into the appropriate area as directed Daily. Titrate 2 units every 3 days if fasting blood sugar is more than 140. Max daily dose 120 units. 36 mL 1 Unknown    and Allergies:  Duloxetine hcl    Objective:    Vital Sign Min/Max for last 24 hours  Temp  Min: 97.8 °F (36.6 °C)  Max: 98.5 °F (36.9 °C)   BP  Min: 113/79  Max: 146/93   Pulse  Min: 82  Max: 92   Resp  Min: 16  Max: 18   SpO2  Min: 95 %  Max: 99 %   No data recorded   Weight  Min: 127 kg (279 lb 15.8 oz)  Max: 127 kg (281 lb)     Flowsheet Rows      Flowsheet Row First Filed Value   Admission Height 182.9 cm (72\") Documented at 05/23/2025 0007   Admission Weight 127 kg (281 lb) Documented at 05/23/2025 0007                 Physical Exam:   Vitals and nursing note reviewed.   Constitutional:       Appearance: Not in distress. Chronically ill-appearing. "   Neck:      Vascular: No JVR. JVD normal.   Pulmonary:      Effort: Pulmonary effort is normal.      Breath sounds: Normal breath sounds. No wheezing. No rhonchi. No rales.   Chest:      Chest wall: Not tender to palpatation.   Cardiovascular:      PMI at left midclavicular line. Normal rate. Regular rhythm. Normal S1. Normal S2.       Murmurs: There is no murmur.      No gallop.  No click. No rub.   Pulses:     Intact distal pulses.   Edema:     Peripheral edema absent.   Abdominal:      General: Bowel sounds are normal.      Palpations: Abdomen is soft.      Tenderness: There is no abdominal tenderness.   Musculoskeletal: Normal range of motion.         General: No tenderness.      Comments: Left AKA Skin:     General: Skin is warm and dry.      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  Multiple shallow crusted ulcerations in the same area.   Neurological:      General: No focal deficit present.      Mental Status: Alert and oriented to person, place and time.         Results Review:   I reviewed the patient's new clinical results.  Results from last 7 days   Lab Units 05/23/25  0813 05/23/25  0016   WBC 10*3/mm3 9.17 8.58   HEMOGLOBIN g/dL 14.8 14.3   HEMATOCRIT % 43.3 42.6   PLATELETS 10*3/mm3 182 185     Results from last 7 days   Lab Units 05/23/25  0812 05/23/25  0016   SODIUM mmol/L 132* 136   POTASSIUM mmol/L 4.2 4.3   CHLORIDE mmol/L 100 101   CO2 mmol/L 21.7* 22.5   BUN mg/dL 18 19   CREATININE mg/dL 1.08 1.36*   GLUCOSE mg/dL 285* 218*   CALCIUM mg/dL 9.0 9.8     Lab Results   Lab Value Date/Time    TROPONINT 64 (C) 05/23/2025 0812    TROPONINT 79 (C) 05/23/2025 0139    TROPONINT 72 (C) 05/23/2025 0016    TROPONINT 48 (H) 03/12/2024 2242    TROPONINT 45 (H) 03/12/2024 2040             Assessment/Plan:      Chest pain, atypical    Essential hypertension    Type 2 diabetes mellitus with hyperglycemia, with long-term current use of  insulin    Elevated troponin    Stage 3a chronic kidney disease    CHF (congestive heart failure)    Atypical chest pain      I have recommended invasive coronary angiography with interventional standby.  Mr. Mendez has been engaged in a patient level discussion explaining the rationale for invasive procedure.  The procedure of coronary angiography with catheter-based coronary revascularization has been explained in detail, using layman's terminology, including risks, benefits and alternatives.  He expresses understanding and desire to proceed.  Further recommendations will be predicated on the results of his catheterization findings.    I discussed the patient's findings and my recommendations with patient    Cornelio Wilson MD  05/23/25  11:44 EDT

## 2025-05-23 NOTE — NURSING NOTE
Referral received for Phase II Cardiac Rehab. Staff reviewed chart and patient has qualifying diagnosis for Phase II Cardiac Rehab.  Met with patient, discussed benefits of exercise, program protocol with ed. material provided.  Pt states he will being going to [facility] for surgical revascularization and will f/u with Pt afterwards.  States he may have transportation issues, but if he's able, and does decide to do program, he will be using our facility with being closer to home.

## 2025-05-23 NOTE — ED PROVIDER NOTES
EMERGENCY DEPARTMENT ENCOUNTER    Pt Name: Deep Mendez  MRN: [id number]  Pt :   1965  Room Number:    Date of encounter:  2025  PCP: Sami Fisher MD  ED Provider: Ronaldo Gates PA-C    Historian: Patient      HPI:  Chief Complaint   Patient presents with    Chest Pain    Shortness of Breath          Context: Deep Mendez is a 59 y.o. male who presents to the ED c/o right-sided chest pain.  Patient states since 930 yesterday morning has had intermittent right-sided chest heaviness and associated shortness of breath.  The pain does not radiate.  No abdominal pain nausea vomiting, has had diarrhea couple days ago but it is resolved.  History of insulin-dependent diabetes, hypertension.  States in the past he has been told he has had a heart attack but is never had a heart cath or stents placed.  Called EMS this evening and then given nitroglycerin and 324 of aspirin and states that nitroglycerin seem to significantly help with his pain.      PAST MEDICAL HISTORY  Past Medical History:   Diagnosis Date    Acute appendicitis with perforation, localized peritonitis, abscess, and gangrene 2024    Diabetes mellitus     Hypertension     Impaired mobility     Rupture of appendix 2024    Type 2 diabetes mellitus          PAST SURGICAL HISTORY  Past Surgical History:   Procedure Laterality Date    APPENDECTOMY N/A 3/13/2024    Procedure: APPENDECTOMY LAPAROSCOPIC;  Surgeon: Yazmin Zayas MD;  Location: [facility];  Service: General;  Laterality: N/A;    BELOW KNEE LEG AMPUTATION      EYE SURGERY           FAMILY HISTORY  Family History   Problem Relation Age of Onset    Diabetes Mother     Diabetes Father     Diabetes Sister          SOCIAL HISTORY  Social History     Socioeconomic History    Marital status: Single   Tobacco Use    Smoking status: Former     Types: Cigarettes    Smokeless tobacco: Never   Vaping Use    Vaping status: Never Used   Substance and Sexual  Activity    Alcohol use: Never    Drug use: Never    Sexual activity: Defer         ALLERGIES  Duloxetine hcl        REVIEW OF SYSTEMS  Review of Systems   Constitutional: Negative.    HENT: Negative.     Eyes: Negative.    Respiratory:  Positive for shortness of breath.    Cardiovascular:  Positive for chest pain.   Gastrointestinal: Negative.    Genitourinary: Negative.    Musculoskeletal: Negative.    Skin: Negative.    Allergic/Immunologic: Negative.    Neurological: Negative.    Psychiatric/Behavioral: Negative.     All other systems reviewed and are negative.       All systems reviewed and negative except for those discussed in HPI.       PHYSICAL EXAM    I have reviewed the triage vital signs and nursing notes.    ED Triage Vitals [05/23/25 0007]   Temp Heart Rate Resp BP SpO2   97.8 °F (36.6 °C) 92 18 140/86 98 %      Temp src Heart Rate Source Patient Position BP Location FiO2 (%)   Oral Monitor Sitting Right arm --       Physical Exam  Vitals and nursing note reviewed.   Constitutional:       General: He is not in acute distress.     Appearance: He is well-developed. He is obese. He is not ill-appearing, toxic-appearing or diaphoretic.   HENT:      Head: Normocephalic and atraumatic.   Eyes:      Extraocular Movements: Extraocular movements intact.   Cardiovascular:      Rate and Rhythm: Normal rate and regular rhythm.      Heart sounds: Normal heart sounds.   Pulmonary:      Effort: Pulmonary effort is normal.      Breath sounds: Normal breath sounds. No decreased breath sounds, wheezing, rhonchi or rales.   Abdominal:      Palpations: Abdomen is soft.   Musculoskeletal:      Cervical back: Normal range of motion.      Comments: Left BKA   Skin:     General: Skin is warm and dry.   Neurological:      General: No focal deficit present.      Mental Status: He is alert.   Psychiatric:         Mood and Affect: Mood normal.         Behavior: Behavior normal.            LAB RESULTS  Recent Results (from the  past 24 hours)   Comprehensive Metabolic Panel    Collection Time: 05/23/25 12:16 AM    Specimen: Blood   Result Value Ref Range    Glucose 218 (H) 65 - 99 mg/dL    BUN 19 6 - 20 mg/dL    Creatinine 1.36 (H) 0.76 - 1.27 mg/dL    Sodium 136 136 - 145 mmol/L    Potassium 4.3 3.5 - 5.2 mmol/L    Chloride 101 98 - 107 mmol/L    CO2 22.5 22.0 - 29.0 mmol/L    Calcium 9.8 8.6 - 10.5 mg/dL    Total Protein 7.0 6.0 - 8.5 g/dL    Albumin 3.8 3.5 - 5.2 g/dL    ALT (SGPT) 15 1 - 41 U/L    AST (SGOT) 15 1 - 40 U/L    Alkaline Phosphatase 96 39 - 117 U/L    Total Bilirubin 0.4 0.0 - 1.2 mg/dL    Globulin 3.2 gm/dL    A/G Ratio 1.2 g/dL    BUN/Creatinine Ratio 14.0 7.0 - 25.0    Anion Gap 12.5 5.0 - 15.0 mmol/L    eGFR 60.3 >60.0 mL/min/1.73   BNP    Collection Time: 05/23/25 12:16 AM    Specimen: Blood   Result Value Ref Range    proBNP 1,230.0 (H) 0.0 - 900.0 pg/mL   High Sensitivity Troponin T    Collection Time: 05/23/25 12:16 AM    Specimen: Blood   Result Value Ref Range    HS Troponin T 72 (C) <22 ng/L   Green Top (Gel)    Collection Time: 05/23/25 12:16 AM   Result Value Ref Range    Extra Tube Hold for add-ons.    Lavender Top    Collection Time: 05/23/25 12:16 AM   Result Value Ref Range    Extra Tube hold for add-on    Gold Top - SST    Collection Time: 05/23/25 12:16 AM   Result Value Ref Range    Extra Tube Hold for add-ons.    Light Blue Top    Collection Time: 05/23/25 12:16 AM   Result Value Ref Range    Extra Tube Hold for add-ons.    CBC Auto Differential    Collection Time: 05/23/25 12:16 AM    Specimen: Blood   Result Value Ref Range    WBC 8.58 3.40 - 10.80 10*3/mm3    RBC 4.93 4.14 - 5.80 10*6/mm3    Hemoglobin 14.3 13.0 - 17.7 g/dL    Hematocrit 42.6 37.5 - 51.0 %    MCV 86.4 79.0 - 97.0 fL    MCH 29.0 26.6 - 33.0 pg    MCHC 33.6 31.5 - 35.7 g/dL    RDW 12.9 12.3 - 15.4 %    RDW-SD 40.0 37.0 - 54.0 fl    MPV 10.5 6.0 - 12.0 fL    Platelets 185 140 - 450 10*3/mm3    Neutrophil % 50.8 42.7 - 76.0 %     Lymphocyte % 34.6 19.6 - 45.3 %    Monocyte % 9.9 5.0 - 12.0 %    Eosinophil % 3.4 0.3 - 6.2 %    Basophil % 0.8 0.0 - 1.5 %    Immature Grans % 0.5 0.0 - 0.5 %    Neutrophils, Absolute 4.36 1.70 - 7.00 10*3/mm3    Lymphocytes, Absolute 2.97 0.70 - 3.10 10*3/mm3    Monocytes, Absolute 0.85 0.10 - 0.90 10*3/mm3    Eosinophils, Absolute 0.29 0.00 - 0.40 10*3/mm3    Basophils, Absolute 0.07 0.00 - 0.20 10*3/mm3    Immature Grans, Absolute 0.04 0.00 - 0.05 10*3/mm3    nRBC 0.0 0.0 - 0.2 /100 WBC   D-dimer, Quantitative    Collection Time: 05/23/25 12:16 AM    Specimen: Blood   Result Value Ref Range    D-Dimer, Quantitative <0.27 0.00 - 0.58 MCGFEU/mL   High Sensitivity Troponin T 1Hr    Collection Time: 05/23/25  1:39 AM    Specimen: Blood   Result Value Ref Range    HS Troponin T 79 (C) <22 ng/L    Troponin T Numeric Delta 7 ng/L    Troponin T % Delta 10 Abnormal if >/= 20%       If labs were ordered, I independently reviewed the results and considered them in treating the patient.        RADIOLOGY  No Radiology Exams Resulted Within Past 24 Hours        PROCEDURES    Procedures    Interpretations    O2 Sat: The patient's oxygen saturation was 98% on Room Air.  This was independently interpreted by me as Normal    EKG: I reviewed and independently interpreted the EKG as rhythm with a rate of 94 bpm.  No ST segment elevation    Cardiac Monitoring: I reviewed and independently interpreted the Rhythm Strip as Normal Sinus rhythm rate of 86    Radiology: I ordered and independently reviewed the above noted radiographic studies.  I viewed images of Chest Xray which showed No pulmonary process per my independent interpretation. See radiologist's dictation for official interpretation.     HEART Score: 7       MEDICATIONS GIVEN IN ER    Medications   sodium chloride 0.9 % flush 10 mL (has no administration in time range)         MEDICAL DECISION MAKING, PROGRESS, and CONSULTS    All labs, if obtained, have been independently  reviewed by me.  All radiology studies, if obtained, have been reviewed by me and the radiologist dictating the report.  All EKG's, if obtained, have been independently viewed and interpreted by me      Discussion below represents my analysis of pertinent findings related to patient's condition, differential diagnosis, treatment plan and final disposition.      Differential diagnosis:    ACS, PE    Additional Sources:  None      Orders placed during this visit:  Orders Placed This Encounter   Procedures    XR Chest 1 View    [place] Draw    Comprehensive Metabolic Panel    BNP    High Sensitivity Troponin T    CBC Auto Differential    D-dimer, Quantitative    High Sensitivity Troponin T 1Hr    NPO Diet NPO Type: Strict NPO    Undress & Gown    Continuous Pulse Oximetry    Vital Signs    Oxygen Therapy- Nasal Cannula; Titrate 1-6 LPM Per SpO2; 90 - 95%    ECG 12 Lead Dyspnea    Insert Peripheral IV    CBC & Differential    Green Top (Gel)    Lavender Top    Gold Top - SST    Light Blue Top         Additional orders considered but not ordered:  None    ED Course:    Consultants:  None    ED Course as of 05/23/25 0229   Fri May 23, 2025   0218 HS Troponin T(!!): 72 [TM]   0218 HS Troponin T(!!): 79 [TM]      ED Course User Index  [TM] Ronaldo Gates PA-C           After my consideration of clinical presentation and any laboratory/radiology studies obtained, I discussed the findings with the patient/patient representative who is in agreement with the treatment plan and the final disposition. Risks and benefits of admission was discussed     AS OF 02:29 EDT VITALS:    BP - 113/70  HR - 82  TEMP - 97.8 °F (36.6 °C) (Oral)  O2 SATS - 97%    I reviewed the patient's prescription monitoring report if available prior to discharge    DIAGNOSIS  Final diagnoses:   Chest pain, unspecified type         DISPOSITION  ED Disposition       ED Disposition   Decision to Admit    Condition   --    Comment   --                    Please note that portions of this document were completed with voice recognition software.        Ronaldo Gates PA-C  05/23/25 2304

## 2025-05-23 NOTE — Clinical Note
Hemostasis started on the right radial artery. R-Band was used in achieving hemostasis. Radial compression device applied to vessel. Hemostasis achieved successfully. Closure device additional comment: 10 ml in TR band at 1333

## 2025-05-23 NOTE — CASE MANAGEMENT/SOCIAL WORK
Discharge Planning Assessment   [facility]     Patient Name: Deep Mendez  MRN: [id number]  Today's Date: 5/23/2025    Admit Date: 5/23/2025        Discharge Needs Assessment       Row Name 05/23/25 1000       Living Environment    People in Home friend(s)    Name(s) of People in Home friend, Dania Woods    In the past 12 months has the electric, gas, oil, or water company threatened to shut off services in your home? No    Primary Care Provided by self    Provides Primary Care For no one, unable/limited ability to care for self    Family Caregiver if Needed friend(s)    Family Caregiver Names friend, Dania Woods    Quality of Family Relationships helpful;involved;supportive    Able to Return to Prior Arrangements yes       Resource/Environmental Concerns    Resource/Environmental Concerns none    Transportation Concerns none       Transportation Needs    In the past 12 months, has lack of transportation kept you from medical appointments or from getting medications? no    In the past 12 months, has lack of transportation kept you from meetings, work, or from getting things needed for daily living? No       Food Insecurity    Within the past 12 months, you worried that your food would run out before you got the money to buy more. Sometimes    Within the past 12 months, the food you bought just didn't last and you didn't have money to get more. Never true       Transition Planning    Patient/Family Anticipates Transition to home with family    Patient/Family Anticipated Services at Transition none    Transportation Anticipated family or friend will provide       Discharge Needs Assessment    Readmission Within the Last 30 Days no previous admission in last 30 days    Equipment Currently Used at Home wheelchair;walker, standard;shower chair    Concerns to be Addressed denies needs/concerns at this time    Do you want help finding or keeping work or a job? I do not need or want help    Do you want help with school  or training? For example, starting or completing job training or getting a high school diploma, GED or equivalent No    Anticipated Changes Related to Illness none    Equipment Needed After Discharge none    Discharge Facility/Level of Care Needs home with home health    Current Discharge Risk chronically ill                   Discharge Plan       Row Name 05/23/25 1000       Plan    Plan Comments DCP is to return to his appartment where he lives with his friend Dania. Spoke with pt at bedside. Permission given to discuss DCP with Dania. Pt confirmed demographics. States no to Living Will/POA. PCP; Dr Sami Fisher, pharmacy is  [facility]. Agreed to meds to bed. Pt has a walker, wheelchair and shower chair. No new DME needs anticipated at this time. Pt states is independent with ADL's and mobility. Does have some financial strain, especially around groceries. He visits a food bank once a month. Food bag will be provided at discharge. Family to transport home when medically stable for discharge.                       Demographic Summary       Row Name 05/23/25 1000       General Information    Admission Type observation    Arrived From emergency department    Required Notices Provided Observation Status Notice    Referral Source admission list    Reason for Consult discharge planning    Preferred Language English       Contact Information    Permission Granted to Share Info With ;family/designee                   Functional Status       Row Name 05/23/25 1000       Functional Status    Usual Activity Tolerance fair    Current Activity Tolerance fair       Physical Activity    On average, how many days per week do you engage in moderate to strenuous exercise (like a brisk walk)? 0 days    On average, how many minutes do you engage in exercise at this level? 0 min    Number of minutes of exercise per week 0       Assessment of Health Literacy    How often do you have someone help you read hospital materials?  Never    How often do you have problems learning about your medical condition because of difficulty understanding written information? Never    How often do you have a problem understanding what is told to you about your medical condition? Never    How confident are you filling out medical forms by yourself? Extremely    Health Literacy Good       Functional Status, IADL    Medications independent    Meal Preparation independent    Housekeeping independent    Laundry independent    Shopping independent    If for any reason you need help with day-to-day activities such as bathing, preparing meals, shopping, managing finances, etc., do you get the help you need? I get all the help I need       Mental Status    General Appearance WDL WDL       Mental Status Summary    Recent Changes in Mental Status/Cognitive Functioning no changes       Employment/    Employment Status disabled                   Psychosocial    No documentation.                  Abuse/Neglect    No documentation.                  Legal    No documentation.                  Substance Abuse    No documentation.                  Patient Forms    No documentation.                     Jacob Jordan RN

## 2025-05-23 NOTE — THERAPY EVALUATION
OT consult received. Per RN, pt awaiting cardiology consult and asking therapy to evaluate patient later in the day, if possible. OT will follow up as patient is appropriate and schedule allows.     12:15: RN reporting plan for cath at 1pm, OT will follow up as schedule allows.

## 2025-05-23 NOTE — CONSULTS
"Patient Name: Deep Mendez  YOB: 1965  MRN: [id number]  Admission date: 5/23/2025  Reason for Encounter: Other: consult for diet education/skin    [facility] Clinical Nutrition Assessment     Subjective    Subjective Information     5/23: A1C=11.7. Pt has been seen by diabetes educator. Multiple diabetic ulcers noted. Will order Xavier BID to help aid in wound healing.      Assessment    H&P and Current Problems      H&P  Past Medical History:   Diagnosis Date    Acute appendicitis with perforation, localized peritonitis, abscess, and gangrene 03/13/2024    Diabetes mellitus     Hypertension     Impaired mobility     Rupture of appendix 03/14/2024    Type 2 diabetes mellitus       Past Surgical History:   Procedure Laterality Date    APPENDECTOMY N/A 3/13/2024    Procedure: APPENDECTOMY LAPAROSCOPIC;  Surgeon: Yazmin Zayas MD;  Location: [facility];  Service: General;  Laterality: N/A;    BELOW KNEE LEG AMPUTATION      EYE SURGERY        Current Problems   Admission Diagnosis:  Atypical chest pain [R07.89]    Problem List:    NSTEMI, initial episode of care    Essential hypertension    Type 2 diabetes mellitus with hyperglycemia, with long-term current use of insulin    Chest pain, atypical    Elevated troponin    Stage 3a chronic kidney disease    CHF (congestive heart failure)    Atypical chest pain    Chest pain      Other Applicable Nutrition Information:        Anthropometrics      BMI, Height, Weight Estimated body mass index is 37.96 kg/m² as calculated from the following:    Height as of this encounter: 182.9 cm (72.01\").    Weight as of this encounter: 127 kg (279 lb 15.8 oz).    Weight Method: Stated       Trending Weight Changes No significant changes       Weight History  Wt Readings from Last 20 Encounters:   05/23/25 0859 127 kg (279 lb 15.8 oz)   05/23/25 0007 127 kg (281 lb)   09/06/24 2033 123 kg (272 lb)   08/20/24 1343 124 kg (272 lb 9.6 oz)   04/03/24 1324 125 kg (275 lb) "   03/15/24 0403 122 kg (269 lb 10 oz)   03/13/24 1758 119 kg (261 lb 7.5 oz)   03/12/24 2030 124 kg (273 lb)        Labs      Comment:      Results from last 7 days   Lab Units 05/23/25  0813 05/23/25  0812 05/23/25  0016   SODIUM mmol/L  --  132* 136   POTASSIUM mmol/L  --  4.2 4.3   GLUCOSE mg/dL  --  285* 218*   BUN mg/dL  --  18 19   CREATININE mg/dL  --  1.08 1.36*   CALCIUM mg/dL  --  9.0 9.8   TOTAL PROTEIN g/dL  --  6.9 7.0   ALBUMIN g/dL  --  3.7 3.8   BILIRUBIN mg/dL  --  0.7 0.4   ALK PHOS U/L  --  81 96   AST (SGOT) U/L  --  19 15   ALT (SGPT) U/L  --  16 15   PLATELETS 10*3/mm3 182  --  185   HEMOGLOBIN g/dL 14.8  --  14.3   HEMATOCRIT % 43.3  --  42.6   PROBNP pg/mL  --   --  1,230.0*     Lab Results   Component Value Date    HGBA1C 11.70 (H) 05/23/2025          Medications       Scheduled Medications aspirin, 325 mg, Oral, Daily  [START ON 5/24/2025] atorvastatin, 80 mg, Oral, Daily  empagliflozin, 25 mg, Oral, Daily  insulin glargine, 1-200 Units, Subcutaneous, Nightly - Glucommander  insulin lispro, 1-200 Units, Subcutaneous, 4x Daily With Meals & Nightly  melatonin, 5 mg, Oral, Nightly  senna-docusate sodium, 2 tablet, Oral, BID        Infusions sodium chloride, 100 mL/hr        PRN Medications   acetaminophen    acetaminophen    ALPRAZolam    senna-docusate sodium **AND** polyethylene glycol **AND** bisacodyl **AND** bisacodyl    senna-docusate sodium **AND** polyethylene glycol **AND** bisacodyl **AND** bisacodyl    calcium carbonate    Calcium Replacement - Follow Nurse / BPA Driven Protocol    dextrose    dextrose    diphenhydrAMINE    [Held by provider] furosemide    glucagon (human recombinant)    HYDROcodone-acetaminophen    insulin lispro    Magnesium Standard Dose Replacement - Follow Nurse / BPA Driven Protocol    Morphine **AND** naloxone    nitroglycerin    ondansetron ODT **OR** ondansetron    Phosphorus Replacement - Follow Nurse / BPA Driven Protocol    Potassium Replacement - Follow  Nurse / BPA Driven Protocol    sodium chloride    temazepam     Physical Findings      Chewing/Swallowing  Teeth Status: Mouth/Teeth WDL: WDL    Chewing/Swallowing Issues: No issues identified at this time   Edema                            Bowel Function  Stool Output  Perineal Care: absorbent brief/pad changed, perineum cleansed (05/23/25 1000)  Last Bowel Movement: 05/22/25   I/Os  Intake & Output (last 3 days)         05/20 0701  05/21 0700 05/21 0701  05/22 0700 05/22 0701  05/23 0700 05/23 0701  05/24 0700            Urine Unmeasured Occurrence    1 x           Lines, Drains, Airways, & Wounds       Active LDAs       Name Placement date Placement time Site Days Last dressing change    Peripheral IV 05/23/25 0004 18 G Left Antecubital 05/23/25  0004  Antecubital  less than 1     Wound 05/23/25 0348 Right distal leg Diabetic Ulcer 05/23/25  0348  -- less than 1     Wound 05/23/25 0700 Right anterior foot 05/23/25  0700  -- less than 1     Wound 05/23/25 0758 Left leg 05/23/25  0758  -- less than 1                       Nutrition Focused Physical Exam     Trending NFPE      Malnutrition Severity Assessment                Estimated Needs      Energy Requirements    Weight for Calculation 127 kg    Method for Estimation  MSJ x 1.2   Daily Needs (kcal/day) 7283-1708 kcal    Protein Requirements    Weight for Calculation 127 kg    Method for Estimation 1.0 gm/kg   Daily Needs (g/day) 127 g pro   Fluid Requirements     Method for Estimation 1 mL/kcal    Daily Needs (mL/day) 8930-8199 mL     Current Nutrition Orders & Evaluation of Intake      Oral Nutrition     Food Allergies  And Intolerances NKFA   Current PO Diet Diet: Cardiac, Diabetic; Healthy Heart (2-3 Na+); Consistent Carbohydrate; Fluid Consistency: Thin (IDDSI 0)   Oral Nutrition Supplement None    PO Evaluation     Trending % PO Intake 5/23: None recorded at this time     Enteral Nutrition     Current EN Order Patient isn't on Tube Feeding  Patient doesn't  have any tube feeding modular orders     EN Route     EN Tolerance     EN Observation         Parenteral Nutrition     Current TPN Order    TPN Route    Lipids (mL/%/frequency)     MVI Frequency     Trace Element Frequency     Total # Days on TPN    TPN Observation       Assessment & Plan   Nutrition Diagnosis and Goals       Nutrition Diagnosis 1 Increased Nutrient Needs (protein) r/t skin integrity AEB multiple diabetic wounds noted.       Nutrition Diagnosis 2         Goal(s) Establish PO Intake and Accepts Oral Nutrition Supplement     Nutrition Intervention and Prescription       Intervention  Start oral nutrition supplement      Diet Prescription CCD, HH    Supplement Prescription Xavier BID     Enteral Prescription        TPN Prescription        Education Provided       Monitoring/Evaluation       Monitor/Evaluation Per Protocol, I&O, PO Intake, Oral Nutrition Supplement Intake, Weight, Skin Status, and GI Status     RD Follow-Up Encounter 7-10 days     Electronically signed by:  Moon Thomas RD  05/23/25 14:08 EDT

## 2025-05-23 NOTE — H&P
[facility]      Name:  Deep Mendez   Age:  59 y.o.  Sex:  male  :  1965  MRN:  [id number]   Visit Number:  [id number]  Admission Date:  2025  Date Of Service:  25  Primary Care Physician:  Sami Fisher MD    Chief Complaint:     Chest pain    History Of Presenting Illness:        59-year-old male.  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  Chief complaint chest pain.  Patient reports intermittent right sided chest pain associated with shortness of breath which had started about 9 AM on May 22.  Improved with nitro. No change with activity.  Was noted to have a elevated troponin in the ED higher than his baseline he were asked to admit.  He elects to be full code.    Pertinent findings: EKG shows sinus rhythm with nonspecific T wave changes, initial troponin 70 2 repeat 79, creatinine 1.36 which is at baseline, glucose 218, D-dimer negative, CBC reviewed, CXR no focal infiltrate or effusion, EKG NSR nonspecific t wave changes    ED Medications:    Medications   sodium chloride 0.9 % flush 10 mL (has no administration in time range)       Edited by: Waqar Swan DO at 2025 0406     Review Of Systems:    All systems were reviewed and negative except as mentioned in history of presenting illness, assessment and plan.    Past Medical History: Patient  has a past medical history of Acute appendicitis with perforation, localized peritonitis, abscess, and gangrene (2024), Diabetes mellitus, Hypertension, Impaired mobility, Rupture of appendix (2024), and Type 2 diabetes mellitus.    Past Surgical History: Patient  has a past surgical  history that includes Below knee leg amputation; Eye surgery; and Appendectomy (N/A, 3/13/2024).    Social History: Patient  reports that he has quit smoking. His smoking use included cigarettes. He has never used smokeless tobacco. He reports that he does not drink alcohol and does not use drugs.    Family History:  Patient's family history has been reviewed and found to be noncontributory.     Allergies:      Duloxetine hcl    Home Medications:    Prior to Admission Medications       Prescriptions Last Dose Informant Patient Reported? Taking?    atorvastatin (LIPITOR) 40 MG tablet   Yes No    Take 1 tablet by mouth Daily.    dapagliflozin Propanediol (Farxiga) 10 MG tablet   Yes No    Take 10 mg by mouth Daily.    furosemide (LASIX) 40 MG tablet   Yes No    Take 1 tablet by mouth At Night As Needed.    glucagon (GLUCAGEN) 1 MG injection   No No    Inject 1 mg under the skin into the appropriate area as directed 1 (One) Time As Needed (severe hypoglycemia) for up to 1 dose.    influenza vac split quad (FLUZONE,FLUARIX,AFLURIA,FLULAVAL) 0.5 ML suspension prefilled syringe injection   Yes No    Insulin Lispro, 1 Unit Dial, (HumaLOG KwikPen) 100 UNIT/ML solution pen-injector   No No    Inject 45 Units under the skin into the appropriate area as directed 3 (Three) Times a Day With Meals. Add additional correction 2 unit for every 50 mg/dl over 150. Max daily dose 200 units.    lisinopril (PRINIVIL,ZESTRIL) 5 MG tablet   Yes No    Take 1 tablet by mouth Daily.    Semaglutide, 2 MG/DOSE, (Ozempic, 2 MG/DOSE,) 8 MG/3ML solution pen-injector   Yes No    Inject 2 mg under the skin into the appropriate area as directed 1 (One) Time Per Week.    Toujeo Max SoloStar 300 UNIT/ML solution pen-injector injection   No No    Inject 114 Units under the skin into the appropriate area as directed Daily. Titrate 2 units every 3 days if fasting blood sugar is more than 140. Max daily dose 120 units.              Vital Signs:  Temp:   "[97.8 °F (36.6 °C)-98.1 °F (36.7 °C)] 98.1 °F (36.7 °C)  Heart Rate:  [82-92] 89  Resp:  [16-18] 16  BP: (113-146)/(70-93) 146/93        05/23/25  0007   Weight: 127 kg (281 lb)     Body mass index is 38.11 kg/m².    Physical Exam:     Most recent vital Signs: /93 (BP Location: Right arm, Patient Position: Lying)   Pulse 89   Temp 98.1 °F (36.7 °C) (Oral)   Resp 16   Ht 182.9 cm (72\")   Wt 127 kg (281 lb)   SpO2 96%   BMI 38.11 kg/m²     Constitutional: Awake, alert  Eyes: PERRLA, sclerae anicteric, no conjunctival injection  HENT: NCAT, mucous membranes moist  Neck: Supple, no thyromegaly, no lymphadenopathy, trachea midline  Respiratory: Clear to auscultation bilaterally, nonlabored respirations   Cardiovascular: RRR, no murmurs, rubs, or gallops, palpable pedal pulses bilaterally  Gastrointestinal: Positive bowel sounds, soft, nontender, nondistended  Musculoskeletal: trace RLE ankle edema LLE surgically absent, no clubbing or cyanosis to extremities  Psychiatric: Appropriate affect, cooperative  Neurologic: Oriented x 3, speech clear  Skin: RLE redness and shallow vascular ulcers changes are chronic per patient.  Edited by: Waqar Swan DO at 5/23/2025 9998      Laboratory data:    I have reviewed the labs done in the emergency room.    Results from last 7 days   Lab Units 05/23/25  0016   SODIUM mmol/L 136   POTASSIUM mmol/L 4.3   CHLORIDE mmol/L 101   CO2 mmol/L 22.5   BUN mg/dL 19   CREATININE mg/dL 1.36*   CALCIUM mg/dL 9.8   BILIRUBIN mg/dL 0.4   ALK PHOS U/L 96   ALT (SGPT) U/L 15   AST (SGOT) U/L 15   GLUCOSE mg/dL 218*     Results from last 7 days   Lab Units 05/23/25  0016   WBC 10*3/mm3 8.58   HEMOGLOBIN g/dL 14.3   HEMATOCRIT % 42.6   PLATELETS 10*3/mm3 185         Results from last 7 days   Lab Units 05/23/25  0139 05/23/25  0016   HSTROP T ng/L 79* 72*     Results from last 7 days   Lab Units 05/23/25 0016   PROBNP pg/mL 1,230.0*                       Invalid input(s): \"USDES\", " "\"NITRITITE\", \"BACT\", \"EP\"    Pain Management Panel           No data to display                EKG:      EKG NSR nonspecific t wave changes    Radiology:    No radiology results for the last 3 days    Assessment/Plan:      Chest pain, atypical  -- Patient per jolie Moore has noncardiac chest pain as it is not midsternal and was relieved by nitroglycerin  but was not exertional.  However he does have significant risk factors and elevated troponin so think he benefits from observation cardiac assessment in the a.m.  Do not think he has ACS at this time.  He has chronically elevated troponin which is higher than baseline but with his pain and EKG not very suspicious I do not think this is ACS.  -- Active Treatments: Aspirin statin, n.p.o.  -- Pending Results: Cardiology consult, echo      Essential hypertension    Type 2 diabetes mellitus with hyperglycemia, with long-term current use of insulin    Elevated troponin    Stage 3a chronic kidney disease    CHF (congestive heart failure)  -- Continue home meds as appropriate.  Subcutaneous insulin, echo          DVT Prophylaxis: Lovenox  Code Status: Full  Diet: N.p.o.  Risk assessment: Moderate anticipate less than two night stay          Edited by: Waqar Swan DO at 5/23/2025 0404           Advance Care Planning   ACP discussion was held with the patient during this visit. Patient does not have an advance directive, declines further assistance.           Waqar Swan DO  05/23/25  04:06 EDT    Dictated utilizing Dragon dictation.    "

## 2025-05-23 NOTE — PLAN OF CARE
Goal Outcome Evaluation: Patient being discharged to [facility] for higher level of care, via EMS

## 2025-05-23 NOTE — PAYOR COMM NOTE
"OBSERVATION NOTIFICATION    To:  [facility]  From: Sadiq Beebe RN  Phone: [phone]  Fax: [phone]  NPI: [id number]  TIN: [id number]      Joya Mendez (59 y.o. Male)       Date of Birth   1965    Social Security Number       Address   [address] [place] [address]    Home Phone   [phone]    MRN   [id number]       Zoroastrianism   None    Marital Status   Single                            Admission Date   2025    Admission Type   Emergency    Admitting Provider   Waqar Swan DO    Attending Provider   Shanon Marie DO    Department, Room/Bed   [facility] TELEMETRY SDS OVERFLOW, T07       Discharge Date       Discharge Disposition       Discharge Destination                                 Attending Provider: Shanon Marie DO    Allergies: Duloxetine Hcl    Isolation: None   Infection: None   Code Status: CPR    Ht: 182.9 cm (72.01\")   Wt: 127 kg (279 lb 15.8 oz)    Admission Cmt: None   Principal Problem: Chest pain, atypical [R07.89]                   Active Insurance as of 2025       Primary Coverage       Payor Plan Insurance Group Employer/Plan Group    UNITED [employer] MEDICARE REPLACEMENT UHC MEDICARE ADVANTAGE [facility] HMO NON PAR [id number]       Payor Plan Address Payor Plan Phone Number Payor Plan Fax Number Effective Dates    [address] [address]   3/1/2025 - None Entered    [facility] [id number]         Subscriber Name Subscriber Birth Date Member ID       JOYA MENDEZ 1965 [id number]                     Emergency Contacts        (Rel.) Home Phone Work Phone Mobile Phone    TODD PEDRO (Friend) [phone] -- --                 History & Physical        Waqar Swan DO at [phone]            [facility] HOSPITALIST   HISTORY AND PHYSICAL      Name:  Joya Mendez   Age:  59 y.o.  Sex:  male  :  1965  MRN:  [id number]   Visit Number:  [id number]  Admission Date:  " 5/23/2025  Date Of Service:  05/23/25  Primary Care Physician:  Sami Fisher MD    Chief Complaint:     Chest pain    History Of Presenting Illness:        59-year-old male.  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  Chief complaint chest pain.  Patient reports intermittent right sided chest pain associated with shortness of breath which had started about 9 AM on May 22.  Improved with nitro. No change with activity.  Was noted to have a elevated troponin in the ED higher than his baseline he were asked to admit.  He elects to be full code.    Pertinent findings: EKG shows sinus rhythm with nonspecific T wave changes, initial troponin 70 2 repeat 79, creatinine 1.36 which is at baseline, glucose 218, D-dimer negative, CBC reviewed, CXR no focal infiltrate or effusion, EKG NSR nonspecific t wave changes    ED Medications:    Medications   sodium chloride 0.9 % flush 10 mL (has no administration in time range)       Edited by: Waqar Swan DO at 5/23/2025 0406     Review Of Systems:    All systems were reviewed and negative except as mentioned in history of presenting illness, assessment and plan.    Past Medical History: Patient  has a past medical history of Acute appendicitis with perforation, localized peritonitis, abscess, and gangrene (03/13/2024), Diabetes mellitus, Hypertension, Impaired mobility, Rupture of appendix (03/14/2024), and Type 2 diabetes mellitus.    Past Surgical History: Patient  has a past surgical history that includes Below knee leg amputation; Eye surgery; and Appendectomy (N/A, 3/13/2024).    Social History: Patient  reports that he has quit smoking. His smoking use included cigarettes. He  has never used smokeless tobacco. He reports that he does not drink alcohol and does not use drugs.    Family History:  Patient's family history has been reviewed and found to be noncontributory.     Allergies:      Duloxetine hcl    Home Medications:    Prior to Admission Medications       Prescriptions Last Dose Informant Patient Reported? Taking?    atorvastatin (LIPITOR) 40 MG tablet   Yes No    Take 1 tablet by mouth Daily.    dapagliflozin Propanediol (Farxiga) 10 MG tablet   Yes No    Take 10 mg by mouth Daily.    furosemide (LASIX) 40 MG tablet   Yes No    Take 1 tablet by mouth At Night As Needed.    glucagon (GLUCAGEN) 1 MG injection   No No    Inject 1 mg under the skin into the appropriate area as directed 1 (One) Time As Needed (severe hypoglycemia) for up to 1 dose.    influenza vac split quad (FLUZONE,FLUARIX,AFLURIA,FLULAVAL) 0.5 ML suspension prefilled syringe injection   Yes No    Insulin Lispro, 1 Unit Dial, (HumaLOG KwikPen) 100 UNIT/ML solution pen-injector   No No    Inject 45 Units under the skin into the appropriate area as directed 3 (Three) Times a Day With Meals. Add additional correction 2 unit for every 50 mg/dl over 150. Max daily dose 200 units.    lisinopril (PRINIVIL,ZESTRIL) 5 MG tablet   Yes No    Take 1 tablet by mouth Daily.    Semaglutide, 2 MG/DOSE, (Ozempic, 2 MG/DOSE,) 8 MG/3ML solution pen-injector   Yes No    Inject 2 mg under the skin into the appropriate area as directed 1 (One) Time Per Week.    Toujeo Max SoloStar 300 UNIT/ML solution pen-injector injection   No No    Inject 114 Units under the skin into the appropriate area as directed Daily. Titrate 2 units every 3 days if fasting blood sugar is more than 140. Max daily dose 120 units.              Vital Signs:  Temp:  [97.8 °F (36.6 °C)-98.1 °F (36.7 °C)] 98.1 °F (36.7 °C)  Heart Rate:  [82-92] 89  Resp:  [16-18] 16  BP: (113-146)/(70-93) 146/93        05/23/25  0007   Weight: 127 kg (281 lb)     Body mass index is  "38.11 kg/m².    Physical Exam:     Most recent vital Signs: /93 (BP Location: Right arm, Patient Position: Lying)   Pulse 89   Temp 98.1 °F (36.7 °C) (Oral)   Resp 16   Ht 182.9 cm (72\")   Wt 127 kg (281 lb)   SpO2 96%   BMI 38.11 kg/m²     Constitutional: Awake, alert  Eyes: PERRLA, sclerae anicteric, no conjunctival injection  HENT: NCAT, mucous membranes moist  Neck: Supple, no thyromegaly, no lymphadenopathy, trachea midline  Respiratory: Clear to auscultation bilaterally, nonlabored respirations   Cardiovascular: RRR, no murmurs, rubs, or gallops, palpable pedal pulses bilaterally  Gastrointestinal: Positive bowel sounds, soft, nontender, nondistended  Musculoskeletal: trace RLE ankle edema LLE surgically absent, no clubbing or cyanosis to extremities  Psychiatric: Appropriate affect, cooperative  Neurologic: Oriented x 3, speech clear  Skin: RLE redness and shallow vascular ulcers changes are chronic per patient.  Edited by: Waqar Swan DO at 5/23/2025 0400      Laboratory data:    I have reviewed the labs done in the emergency room.    Results from last 7 days   Lab Units 05/23/25  0016   SODIUM mmol/L 136   POTASSIUM mmol/L 4.3   CHLORIDE mmol/L 101   CO2 mmol/L 22.5   BUN mg/dL 19   CREATININE mg/dL 1.36*   CALCIUM mg/dL 9.8   BILIRUBIN mg/dL 0.4   ALK PHOS U/L 96   ALT (SGPT) U/L 15   AST (SGOT) U/L 15   GLUCOSE mg/dL 218*     Results from last 7 days   Lab Units 05/23/25  0016   WBC 10*3/mm3 8.58   HEMOGLOBIN g/dL 14.3   HEMATOCRIT % 42.6   PLATELETS 10*3/mm3 185         Results from last 7 days   Lab Units 05/23/25  0139 05/23/25  0016   HSTROP T ng/L 79* 72*     Results from last 7 days   Lab Units 05/23/25  0016   PROBNP pg/mL 1,230.0*                       Invalid input(s): \"USDES\", \"NITRITITE\", \"BACT\", \"EP\"    Pain Management Panel           No data to display                EKG:      EKG NSR nonspecific t wave changes    Radiology:    No radiology results for the last 3 " days    Assessment/Plan:      Chest pain, atypical  -- Patient per jolie Moore has noncardiac chest pain as it is not midsternal and was relieved by nitroglycerin  but was not exertional.  However he does have significant risk factors and elevated troponin so think he benefits from observation cardiac assessment in the a.m.  Do not think he has ACS at this time.  He has chronically elevated troponin which is higher than baseline but with his pain and EKG not very suspicious I do not think this is ACS.  -- Active Treatments: Aspirin statin, n.p.o.  -- Pending Results: Cardiology consult, echo      Essential hypertension    Type 2 diabetes mellitus with hyperglycemia, with long-term current use of insulin    Elevated troponin    Stage 3a chronic kidney disease    CHF (congestive heart failure)  -- Continue home meds as appropriate.  Subcutaneous insulin, echo          DVT Prophylaxis: Lovenox  Code Status: Full  Diet: N.p.o.  Risk assessment: Moderate anticipate less than two night stay          Edited by: Waqar Swan DO at 5/23/2025 0404           Advance Care Planning  ACP discussion was held with the patient during this visit. Patient does not have an advance directive, declines further assistance.           Waqar Swan DO  05/23/25  04:06 EDT    Dictated utilizing Dragon dictation.      Electronically signed by Waqar Swan DO at 05/23/25 0408       Vital Signs (last 2 days)       Date/Time Temp Temp src Pulse Resp BP Patient Position SpO2    05/23/25 1100 98.2 (36.8) Oral 82 16 -- -- 96    05/23/25 0859 -- -- -- -- 132/73 -- --    05/23/25 0700 98.5 (36.9) Oral 87 17 132/73 Sitting 98    05/23/25 0336 98.1 (36.7) Oral 89 16 146/93 Lying 96    05/23/25 0257 -- -- 83 -- 115/78 Sitting 98    05/23/25 0227 -- -- -- -- 121/76 -- 96    05/23/25 0157 -- -- 86 -- 113/79 -- 99    05/23/25 0130 -- -- 82 -- 113/70 -- 97    05/23/25 0057 -- -- 82 -- 127/82 -- 97    05/23/25 0027 -- -- 87 -- 122/73 --  95    05/23/25 0007 97.8 (36.6) Oral 92 18 140/86 Sitting 98          Oxygen Therapy (last 2 days)       Date/Time SpO2 Device (Oxygen Therapy) Flow (L/min) (Oxygen Therapy) Oxygen Concentration (%) ETCO2 (mmHg)    05/23/25 1100 96 room air -- -- --    05/23/25 0815 -- room air -- -- --    05/23/25 0700 98 room air -- -- --    05/23/25 0341 -- room air -- -- --    05/23/25 0336 96 room air -- -- --    05/23/25 0257 98 room air -- -- --    05/23/25 0227 96 -- -- -- --    05/23/25 0157 99 -- -- -- --    05/23/25 0130 97 -- -- -- --    05/23/25 0057 97 -- -- -- --    05/23/25 0027 95 -- -- -- --    05/23/25 0007 98 room air -- -- --          Current Facility-Administered Medications   Medication Dose Route Frequency Provider Last Rate Last Admin    acetaminophen (TYLENOL) tablet 650 mg  650 mg Oral Q4H PRN Waqar Swan, DO        aspirin EC tablet 325 mg  325 mg Oral Daily Cornelio Wilson MD        [START ON 5/24/2025] atorvastatin (LIPITOR) tablet 80 mg  80 mg Oral Daily Cornelio Wilson MD        sennosides-docusate (PERICOLACE) 8.6-50 MG per tablet 2 tablet  2 tablet Oral BID PRN Waqar Swan, DO        And    polyethylene glycol (MIRALAX) packet 17 g  17 g Oral Daily PRN Sosa, Waqar Wall, DO        And    bisacodyl (DULCOLAX) EC tablet 5 mg  5 mg Oral Daily PRN Sosa, Waqar Robinith, DO        And    bisacodyl (DULCOLAX) suppository 10 mg  10 mg Rectal Daily PRN Sosa, Waqar Wall, DO        sennosides-docusate (PERICOLACE) 8.6-50 MG per tablet 2 tablet  2 tablet Oral BID Sosa, Waqar Robinith, DO   2 tablet at 05/23/25 0857    And    polyethylene glycol (MIRALAX) packet 17 g  17 g Oral Daily PRN Waqar Swan, DO        And    bisacodyl (DULCOLAX) EC tablet 5 mg  5 mg Oral Daily PRN Waqar Swan DO        And    bisacodyl (DULCOLAX) suppository 10 mg  10 mg Rectal Daily PRN Waqar Swan, DO        calcium carbonate (TUMS) chewable tablet 500 mg (200 mg elemental)  2 tablet  Oral BID PRN Waqar Swan,         Calcium Replacement - Follow Nurse / BPA Driven Protocol   Not Applicable PRN Waqar Swan, DO        dextrose (D50W) (25 g/50 mL) IV injection 10-50 mL  10-50 mL Intravenous Q15 Min PRN Waqar Swan,         dextrose (GLUTOSE) oral gel 15 g  15 g Oral Q15 Min PRN Waqar Swan, DO        empagliflozin (JARDIANCE) tablet 25 mg  25 mg Oral Daily Waqar Swan, DO        [Held by provider] furosemide (LASIX) tablet 40 mg  40 mg Oral Nightly PRN Waqar Swan DO        Glucagon (GLUCAGEN) injection 1 mg  1 mg Intramuscular Q15 Min PRN Waqar Swan,         insulin glargine (LANTUS, SEMGLEE) injection 1-200 Units  1-200 Units Subcutaneous Nightly - Glucommander Waqar Swan DO        insulin lispro (humaLOG) injection 1-200 Units  1-200 Units Subcutaneous 4x Daily With Meals & Nightly Waqar Swan, DO   4 Units at 05/23/25 1214    insulin lispro (humaLOG) injection 1-200 Units  1-200 Units Subcutaneous PRN Waqar Swan,         Magnesium Standard Dose Replacement - Follow Nurse / BPA Driven Protocol   Not Applicable PRN Waqar Swan,         melatonin tablet 5 mg  5 mg Oral Nightly Waqar Swan,         nitroglycerin (NITROSTAT) SL tablet 0.4 mg  0.4 mg Sublingual Q5 Min PRN Waqar Swan,    0.4 mg at 05/23/25 0414    ondansetron ODT (ZOFRAN-ODT) disintegrating tablet 4 mg  4 mg Oral Q6H PRN Waqar Swan,         Or    ondansetron (ZOFRAN) injection 4 mg  4 mg Intravenous Q6H PRN Waqar Swan,         Phosphorus Replacement - Follow Nurse / BPA Driven Protocol   Not Applicable PRN Waqar Swan,         Potassium Replacement - Follow Nurse / BPA Driven Protocol   Not Applicable PRN Waqar Swan, DO        sodium chloride 0.9 % flush 10 mL  10 mL Intravenous PRN Waqar Swan,          Lab Results (last 48 hours)       Procedure Component Value  Units Date/Time    POC Glucose 4x Daily Before Meals & at Bedtime [[id number]]  (Abnormal) Collected: 05/23/25 1141    Specimen: Blood Updated: 05/23/25 1143     Glucose 238 mg/dL      Comment: Serial Number: [id number]:  [id number]       High Sensitivity Troponin T [[id number]]  (Abnormal) Collected: 05/23/25 0812    Specimen: Blood Updated: 05/23/25 0913     HS Troponin T 64 ng/L     Narrative:      High Sensitive Troponin T Reference Range:  <14.0 ng/L- Negative Female for AMI  <22.0 ng/L- Negative Male for AMI  >=14 - Abnormal Female indicating possible myocardial injury.  >=22 - Abnormal Male indicating possible myocardial injury.   Clinicians would have to utilize clinical acumen, EKG, Troponin, and serial changes to determine if it is an Acute Myocardial Infarction or myocardial injury due to an underlying chronic condition.         Comprehensive Metabolic Panel [[id number]]  (Abnormal) Collected: 05/23/25 0812    Specimen: Blood Updated: 05/23/25 0851     Glucose 285 mg/dL      Comment: Glucose >180, Hemoglobin A1C recommended.        BUN 18 mg/dL      Creatinine 1.08 mg/dL      Sodium 132 mmol/L      Potassium 4.2 mmol/L      Chloride 100 mmol/L      CO2 21.7 mmol/L      Calcium 9.0 mg/dL      Total Protein 6.9 g/dL      Albumin 3.7 g/dL      ALT (SGPT) 16 U/L      AST (SGOT) 19 U/L      Alkaline Phosphatase 81 U/L      Total Bilirubin 0.7 mg/dL      Globulin 3.2 gm/dL      A/G Ratio 1.2 g/dL      BUN/Creatinine Ratio 16.7     Anion Gap 10.3 mmol/L      eGFR 79.5 mL/min/1.73     Narrative:      GFR Categories in Chronic Kidney Disease (CKD)              GFR Category          GFR (mL/min/1.73)    Interpretation  G1                    90 or greater        Normal or high (1)  G2                    60-89                Mild decrease (1)  G3a                   45-59                Mild to moderate decrease  G3b                   30-44                Moderate to severe decrease  G4                    15-29                 Severe decrease  G5                    14 or less           Kidney failure    (1)In the absence of evidence of kidney disease, neither GFR category G1 or G2 fulfill the criteria for CKD.    eGFR calculation 2021 CKD-EPI creatinine equation, which does not include race as a factor    CBC (No Diff) [[id number]]  (Normal) Collected: 05/23/25 0813    Specimen: Blood Updated: 05/23/25 0828     WBC 9.17 10*3/mm3      RBC 5.13 10*6/mm3      Hemoglobin 14.8 g/dL      Hematocrit 43.3 %      MCV 84.4 fL      MCH 28.8 pg      MCHC 34.2 g/dL      RDW 13.1 %      RDW-SD 40.5 fl      MPV 10.4 fL      Platelets 182 10*3/mm3     Hemoglobin A1c [[id number]]  (Abnormal) Collected: 05/23/25 0016    Specimen: Blood Updated: 05/23/25 0802     Hemoglobin A1C 11.70 %     Narrative:      Hemoglobin A1C Ranges:    Increased Risk for Diabetes  5.7% to 6.4%  Diabetes                     >= 6.5%  Diabetic Goal                < 7.0%    POC Glucose 4x Daily Before Meals & at Bedtime [[id number]]  (Abnormal) Collected: 05/23/25 0746    Specimen: Blood Updated: 05/23/25 0801     Glucose 264 mg/dL      Comment: Serial Number: [id number]:  [id number]       High Sensitivity Troponin T 1Hr [[id number]]  (Abnormal) Collected: 05/23/25 0139    Specimen: Blood Updated: 05/23/25 0217     HS Troponin T 79 ng/L      Troponin T Numeric Delta 7 ng/L      Troponin T % Delta 10    Narrative:      High Sensitive Troponin T Reference Range:  <14.0 ng/L- Negative Female for AMI  <22.0 ng/L- Negative Male for AMI  >=14 - Abnormal Female indicating possible myocardial injury.  >=22 - Abnormal Male indicating possible myocardial injury.   Clinicians would have to utilize clinical acumen, EKG, Troponin, and serial changes to determine if it is an Acute Myocardial Infarction or myocardial injury due to an underlying chronic condition.         High Sensitivity Troponin T [[id number]]  (Abnormal) Collected: 05/23/25 0016    Specimen: Blood Updated: 05/23/25  "0057     HS Troponin T 72 ng/L     Narrative:      High Sensitive Troponin T Reference Range:  <14.0 ng/L- Negative Female for AMI  <22.0 ng/L- Negative Male for AMI  >=14 - Abnormal Female indicating possible myocardial injury.  >=22 - Abnormal Male indicating possible myocardial injury.   Clinicians would have to utilize clinical acumen, EKG, Troponin, and serial changes to determine if it is an Acute Myocardial Infarction or myocardial injury due to an underlying chronic condition.         D-dimer, Quantitative [[id number]]  (Normal) Collected: 05/23/25 0016    Specimen: Blood Updated: 05/23/25 0048     D-Dimer, Quantitative <0.27 MCGFEU/mL     Narrative:      According to the assay 's published package insert, a normal (<0.50 MCGFEU/mL) D-dimer result in conjunction with a non-high clinical probability assessment, excludes deep vein thrombosis (DVT) and pulmonary embolism (PE) with high sensitivity.    D-dimer values increase with age and this can make VTE exclusion of an older population difficult. To address this, the American College of [employer], based on best available evidence and recent guidelines, recommends that clinicians use age-adjusted D-dimer thresholds in patients greater than 50 years of age with: a) a low probability of PE who do not meet all Pulmonary Embolism Rule Out Criteria, or b) in those with intermediate probability of PE.   The formula for an age-adjusted D-dimer cut-off is \"age/100\".  For example, a 60 year old patient would have an age-adjusted cut-off of 0.60 MCGFEU/mL and an 80 year old 0.80 MCGFEU/mL.    BNP [[id number]]  (Abnormal) Collected: 05/23/25 0016    Specimen: Blood Updated: 05/23/25 0048     proBNP 1,230.0 pg/mL     Narrative:      This assay is used as an aid in the diagnosis of individuals suspected of having heart failure. It can be used as an aid in the diagnosis of acute decompensated heart failure (ADHF) in patients presenting with signs and symptoms of " ADHF to the emergency department (ED). In addition, NT-proBNP of <300 pg/mL indicates ADHF is not likely.    Age Range Result Interpretation  NT-proBNP Concentration (pg/mL:      <50             Positive            >450                   Gray                 300-450                    Negative             <300    50-75           Positive            >900                  Gray                300-900                  Negative            <300      >75             Positive            >1800                  Gray                300-1800                  Negative            <300    Comprehensive Metabolic Panel [[id number]]  (Abnormal) Collected: 05/23/25 0016    Specimen: Blood Updated: 05/23/25 0040     Glucose 218 mg/dL      Comment: Glucose >180, Hemoglobin A1C recommended.        BUN 19 mg/dL      Creatinine 1.36 mg/dL      Sodium 136 mmol/L      Potassium 4.3 mmol/L      Chloride 101 mmol/L      CO2 22.5 mmol/L      Calcium 9.8 mg/dL      Total Protein 7.0 g/dL      Albumin 3.8 g/dL      ALT (SGPT) 15 U/L      AST (SGOT) 15 U/L      Alkaline Phosphatase 96 U/L      Total Bilirubin 0.4 mg/dL      Globulin 3.2 gm/dL      A/G Ratio 1.2 g/dL      BUN/Creatinine Ratio 14.0     Anion Gap 12.5 mmol/L      eGFR 60.3 mL/min/1.73     Narrative:      GFR Categories in Chronic Kidney Disease (CKD)              GFR Category          GFR (mL/min/1.73)    Interpretation  G1                    90 or greater        Normal or high (1)  G2                    60-89                Mild decrease (1)  G3a                   45-59                Mild to moderate decrease  G3b                   30-44                Moderate to severe decrease  G4                    15-29                Severe decrease  G5                    14 or less           Kidney failure    (1)In the absence of evidence of kidney disease, neither GFR category G1 or G2 fulfill the criteria for CKD.    eGFR calculation 2021 CKD-EPI creatinine equation, which does not include  race as a factor    [place] Draw [[id number]] Collected: 05/23/25 0016    Specimen: Blood Updated: 05/23/25 0030    Narrative:      The following orders were created for panel order [place] Draw.  Procedure                               Abnormality         Status                     ---------                               -----------         ------                     Green Top (Gel)[[id number]]                                  Final result               Lavender Top[[id number]]                                     Final result               Gold Top - SST[[id number]]                                   Final result               Light Blue Top[[id number]]                                   Final result                 Please view results for these tests on the individual orders.    Green Top (Gel) [[id number]] Collected: 05/23/25 0016    Specimen: Blood Updated: 05/23/25 0030     Extra Tube Hold for add-ons.     Comment: Auto resulted.       Lavender Top [[id number]] Collected: 05/23/25 0016    Specimen: Blood Updated: 05/23/25 0030     Extra Tube hold for add-on     Comment: Auto resulted       Gold Top - SST [[id number]] Collected: 05/23/25 0016    Specimen: Blood Updated: 05/23/25 0030     Extra Tube Hold for add-ons.     Comment: Auto resulted.       Light Blue Top [[id number]] Collected: 05/23/25 0016    Specimen: Blood Updated: 05/23/25 0030     Extra Tube Hold for add-ons.     Comment: Auto resulted       CBC & Differential [[id number]]  (Normal) Collected: 05/23/25 0016    Specimen: Blood Updated: 05/23/25 0020    Narrative:      The following orders were created for panel order CBC & Differential.  Procedure                               Abnormality         Status                     ---------                               -----------         ------                     CBC Auto Differential[[id number]]        Normal              Final result                 Please view results for these tests on the individual orders.     CBC Auto Differential [[id number]]  (Normal) Collected: 05/23/25 0016    Specimen: Blood Updated: 05/23/25 0020     WBC 8.58 10*3/mm3      RBC 4.93 10*6/mm3      Hemoglobin 14.3 g/dL      Hematocrit 42.6 %      MCV 86.4 fL      MCH 29.0 pg      MCHC 33.6 g/dL      RDW 12.9 %      RDW-SD 40.0 fl      MPV 10.5 fL      Platelets 185 10*3/mm3      Neutrophil % 50.8 %      Lymphocyte % 34.6 %      Monocyte % 9.9 %      Eosinophil % 3.4 %      Basophil % 0.8 %      Immature Grans % 0.5 %      Neutrophils, Absolute 4.36 10*3/mm3      Lymphocytes, Absolute 2.97 10*3/mm3      Monocytes, Absolute 0.85 10*3/mm3      Eosinophils, Absolute 0.29 10*3/mm3      Basophils, Absolute 0.07 10*3/mm3      Immature Grans, Absolute 0.04 10*3/mm3      nRBC 0.0 /100 WBC           Imaging Results (Last 48 Hours)       Procedure Component Value Units Date/Time    XR Chest 1 View [[id number]] Collected: 05/23/25 0813     Updated: 05/23/25 0826    Narrative:      PROCEDURE: XR CHEST 1 VW-     HISTORY: SOA Triage Protocol     COMPARISON: None.     FINDINGS: The heart is normal in size. The mediastinum is unremarkable.  There is decreased inspiratory effort. The lungs are clear. There is no  pneumothorax.  There are no acute osseous abnormalities.       Impression:      No acute cardiopulmonary process.                       Images were reviewed, interpreted, and dictated by Dr. Laurie Braga MD  Transcribed by Jovany Dwyer PA-C.     This report was signed and finalized on 5/23/2025 8:24 AM by Laurie Braga MD.             Orders (last 48 hrs)        Start     Ordered    05/24/25 0900  atorvastatin (LIPITOR) tablet 80 mg  Daily         05/23/25 1144    05/23/25 2100  insulin glargine (LANTUS, SEMGLEE) injection 1-200 Units  Nightly - Glucommander         05/23/25 0327    05/23/25 1230  aspirin EC tablet 325 mg  Daily         05/23/25 1144    05/23/25 1147  Cardiac Catheterization/Vascular Study  Once         05/23/25 1146    05/23/25 1145  Obtain  "Informed Consent  Once         05/23/25 1144    05/23/25 1142  Case Request Cath Lab: Coronary angiography  Once         05/23/25 1144    05/23/25 0945  Sulfur Hexafluoride Microsph (LUMASON) 60.7-25 MG IV reconstituted suspension reconstituted suspension 3 mL  Once in Imaging         05/23/25 0859    05/23/25 0900  atorvastatin (LIPITOR) tablet 40 mg  Daily,   Status:  Discontinued         05/23/25 0327    05/23/25 0900  empagliflozin (JARDIANCE) tablet 25 mg  Daily         05/23/25 0327    05/23/25 0900  sennosides-docusate (PERICOLACE) 8.6-50 MG per tablet 2 tablet  2 Times Daily        Placed in \"And\" Linked Group    05/23/25 0327 05/23/25 0800  Oral Care  2 Times Daily       05/23/25 0327    05/23/25 0800  insulin lispro (humaLOG) injection 1-200 Units  4 Times Daily With Meals & Nightly         05/23/25 0327    05/23/25 0747  Hemoglobin A1c  Once         05/23/25 0746    05/23/25 0732  Inpatient Diabetes Educator Consult  Once        Provider:  (Not yet assigned)    05/23/25 0731    05/23/25 0729  Wound Ostomy Eval & Treat  Once         05/23/25 0728    05/23/25 0729  Inpatient Nutrition Consult  Once        Provider:  (Not yet assigned)    05/23/25 0730    05/23/25 0702  Inpatient Cardiology Consult  IN AM        Specialty:  Cardiology  Provider:  Bradley Wade MD    05/23/25 0327    05/23/25 0700  POC Glucose 4x Daily Before Meals & at Bedtime  4 Times Daily Before Meals & at Bedtime      Comments: Complete no more than 45 minutes prior to patient eating      05/23/25 0327    05/23/25 0614  CBC (No Diff)  Morning Draw         05/23/25 0327    05/23/25 0614  Comprehensive Metabolic Panel  Morning Draw         05/23/25 0327    05/23/25 0614  High Sensitivity Troponin T  Morning Draw         05/23/25 0327    05/23/25 0415  melatonin tablet 5 mg  Nightly         05/23/25 0327    05/23/25 0405  Adult Transthoracic Echo Complete W/ Cont if Necessary Per Protocol  Once         05/23/25 0404    05/23/25 0400 " " Vital Signs  Every 4 Hours       05/23/25 0327    05/23/25 0328  Vital Signs Every 15 Minutes Until Stable, Then Every 4 Hours  Continuous         05/23/25 0327    05/23/25 0328  Continuous Cardiac Monitoring  Continuous        Comments: Follow Standing Orders As Outlined in Process Instructions (Open Order Report to View Full Instructions)    05/23/25 0327    05/23/25 0328  Maintain IV Access  Continuous         05/23/25 0327    05/23/25 0328  Telemetry - Place Orders & Notify Provider of Results When Patient Experiences Acute Chest Pain, Dysrhythmia or Respiratory Distress  Continuous        Comments: Open Order Report to View Parameters Requiring Provider Notification    05/23/25 0327    05/23/25 0328  May Be Off Telemetry for Tests  Continuous         05/23/25 0327    05/23/25 0328  ECG 12 Lead Chest Pain  Now Then Every 6 Hours       05/23/25 0327    05/23/25 0328  Initiate Glucommander™ SQ  Once         05/23/25 0327    05/23/25 0328  Patient is on Glucommander  Continuous         05/23/25 0327    05/23/25 0328  RN to Order STAT Glucose (Lab Performed) for POC Glucose <10 or >600  Continuous        Comments: Do NOT Delay Treatment of Unstable Patient to Obtain Glucose Sample From Lab  Notify Provider of Results    05/23/25 0327 05/23/25 0328  OT Consult: Eval & Treat ADL Performance Below Baseline, Discharge Placement Assessment  Once         05/23/25 0327    05/23/25 0328  PT Consult: Eval & Treat Discharge Placement Assessment, Functional Mobility Below Baseline  Once         05/23/25 0327    05/23/25 0328  NPO Diet NPO Type: Sips with Meds  Diet Effective Now         05/23/25 0327    05/23/25 0327  sennosides-docusate (PERICOLACE) 8.6-50 MG per tablet 2 tablet  2 Times Daily PRN        Placed in \"And\" Linked Group    05/23/25 0327    05/23/25 0327  polyethylene glycol (MIRALAX) packet 17 g  Daily PRN        Placed in \"And\" Linked Group    05/23/25 0327    05/23/25 0327  bisacodyl (DULCOLAX) EC tablet 5 " "mg  Daily PRN        Placed in \"And\" Linked Group    05/23/25 0327    05/23/25 0327  bisacodyl (DULCOLAX) suppository 10 mg  Daily PRN        Placed in \"And\" Linked Group    05/23/25 0327    05/23/25 0327  ondansetron ODT (ZOFRAN-ODT) disintegrating tablet 4 mg  Every 6 Hours PRN        Placed in \"Or\" Linked Group    05/23/25 0327    05/23/25 0327  ondansetron (ZOFRAN) injection 4 mg  Every 6 Hours PRN        Placed in \"Or\" Linked Group    05/23/25 0327    05/23/25 0327  polyethylene glycol (MIRALAX) packet 17 g  Daily PRN        Placed in \"And\" Linked Group    05/23/25 0327    05/23/25 0327  bisacodyl (DULCOLAX) EC tablet 5 mg  Daily PRN        Placed in \"And\" Linked Group    05/23/25 0327    05/23/25 0327  bisacodyl (DULCOLAX) suppository 10 mg  Daily PRN        Placed in \"And\" Linked Group    05/23/25 0327    05/23/25 0327  Potassium Replacement - Follow Nurse / BPA Driven Protocol  As Needed         05/23/25 0327    05/23/25 0327  Magnesium Standard Dose Replacement - Follow Nurse / BPA Driven Protocol  As Needed         05/23/25 0327    05/23/25 0327  Phosphorus Replacement - Follow Nurse / BPA Driven Protocol  As Needed         05/23/25 0327    05/23/25 0327  Calcium Replacement - Follow Nurse / BPA Driven Protocol  As Needed         05/23/25 0327    05/23/25 0327  [Held by provider]  furosemide (LASIX) tablet 40 mg  Nightly PRN        (On hold since today at 0327 until manually unheld; held by Waqar Swan DOHold Reason: Abnormal Labs)    05/23/25 0327    05/23/25 0327  acetaminophen (TYLENOL) tablet 650 mg  Every 4 Hours PRN         05/23/25 0327    05/23/25 0327  calcium carbonate (TUMS) chewable tablet 500 mg (200 mg elemental)  2 Times Daily PRN         05/23/25 0327    05/23/25 0327  nitroglycerin (NITROSTAT) SL tablet 0.4 mg  Every 5 Minutes PRN         05/23/25 0327    05/23/25 0327  insulin lispro (humaLOG) injection 1-200 Units  As Needed         05/23/25 0327 05/23/25 0327  dextrose " (GLUTOSE) oral gel 15 g  Every 15 Minutes PRN         05/23/25 0327    05/23/25 0327  dextrose (D50W) (25 g/50 mL) IV injection 10-50 mL  Every 15 Minutes PRN         05/23/25 0327    05/23/25 0327  Glucagon (GLUCAGEN) injection 1 mg  Every 15 Minutes PRN         05/23/25 0327    05/23/25 0247  Initiate Observation Status  Once         05/23/25 0248    05/23/25 0247  Code Status and Medical Interventions: CPR (Attempt to Resuscitate); Full Support  Continuous         05/23/25 0248    05/23/25 0116  High Sensitivity Troponin T  Once,   Status:  Canceled         05/23/25 0051    05/23/25 0116  High Sensitivity Troponin T 1Hr  PROCEDURE ONCE         05/23/25 0057 05/23/25 0029  D-dimer, Quantitative  STAT         05/23/25 0030    05/23/25 0009  NPO Diet NPO Type: Strict NPO  Diet Effective Now,   Status:  Canceled         05/23/25 0008 05/23/25 0009  Undress & Gown  Once         05/23/25 0008 05/23/25 0009  Cardiac Monitoring  Continuous,   Status:  Canceled        Comments: Follow Standing Orders As Outlined in Process Instructions (Open Order Report to View Full Instructions)    05/23/25 0008 05/23/25 0009  Continuous Pulse Oximetry  Continuous         05/23/25 0008 05/23/25 0009  Vital Signs  Per Hospital Policy/Protocol         05/23/25 0008 05/23/25 0009  ECG 12 Lead Dyspnea  Once         05/23/25 0008    05/23/25 0009  Insert Peripheral IV  Once         05/23/25 0008    05/23/25 0009  [place] Draw  Once         05/23/25 0008    05/23/25 0009  CBC & Differential  Once         05/23/25 0008 05/23/25 0009  Comprehensive Metabolic Panel  Once         05/23/25 0008 05/23/25 0009  BNP  Once         05/23/25 0008 05/23/25 0009  High Sensitivity Troponin T  Once         05/23/25 0008 05/23/25 0009  XR Chest 1 View  1 Time Imaging         05/23/25 0008 05/23/25 0009  Green Top (Gel)  PROCEDURE ONCE         05/23/25 0008 05/23/25 0009  Lavender Top  PROCEDURE ONCE         05/23/25 0008  "   05/23/25 0009  Gold Top - SST  PROCEDURE ONCE         05/23/25 0008    05/23/25 0009  Light Blue Top  PROCEDURE ONCE         05/23/25 0008    05/23/25 0009  CBC Auto Differential  PROCEDURE ONCE         05/23/25 0008 05/23/25 0008  sodium chloride 0.9 % flush 10 mL  As Needed         05/23/25 0008    Unscheduled  Oxygen Therapy- Nasal Cannula; Titrate 1-6 LPM Per SpO2; 90 - 95%  Continuous PRN       05/23/25 0008    Unscheduled  POC Glucose PRN  As Needed      Comments: Complete no more than 45 minutes prior to patient eating      05/23/25 0327    Unscheduled  Treat Hypoglycemia As Recommended By Glucommander™ & Notify Provider of Treatment  As Needed      Comments: Follow Hypoglycemia Orders As Outlined in Process Instructions (Open Order Report to View Full Instructions)  Notify Provider Any Time Hypoglycemia Treatment is Administered    05/23/25 0327    --  TIRZEPATIDE SC         05/23/25 1025                  Physician Progress Notes (last 48 hours)  Notes from 05/21/25 1230 through 05/23/25 1230   No notes of this type exist for this encounter.          Consult Notes (last 48 hours)        Cornelio Wilson MD at 05/23/25 1144        Consult Orders    1. Inpatient Cardiology Consult [[id number]] ordered by Waqar Swan DO at 05/23/25 0248                 [facility]-Cardiology Consult Note    Referring Provider: Cynthia  Reason for Consultation: NSTEMI    Patient Care Team:  Sami Fisher MD as PCP - General (Internal Medicine)  Provider, No Known as PCP - Family Medicine  Yazmin Zayas MD as Surgeon (General Surgery)  Kaye Stanton PA-C as Physician Assistant (Endocrinology)    Chief complaint : Chest pain    Subjective:    History of present illness: This is a 59-year-old male patient who presents to the emergency room with chest discomfort.  The patient reports having anterior chest pressure without radiation \"all day yesterday\".  The patient reports having a history of \"multiple\" heart attacks " "but has never had heart catheterization or any form of coronary revascularization.  In the emergency room his twelve-lead electrocardiogram showed sinus rhythm with nonspecific ST-T wave changes.  Cardiac troponins were elevated in a \"plateau-type\" pattern.  His troponins have been elevated in a similar magnitude for the last 12 months.  He has a history of severe type 2 diabetes mellitus with insulin requirement.  He has morbid obesity.  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  He stopped smoking 20 years ago.    Review of Systems   Review of Systems   Constitutional: Negative for chills, diaphoresis, fever, malaise/fatigue, weight gain and weight loss.   HENT:  Negative for ear discharge, hearing loss, hoarse voice and nosebleeds.    Eyes:  Negative for discharge, double vision, pain and photophobia.   Cardiovascular:  Positive for chest pain. Negative for claudication, cyanosis, irregular heartbeat, leg swelling, near-syncope, orthopnea, palpitations, paroxysmal nocturnal dyspnea and syncope.   Respiratory:  Negative for cough, hemoptysis, sputum production and wheezing.    Endocrine: Negative for cold intolerance, heat intolerance, polydipsia, polyphagia and polyuria.   Hematologic/Lymphatic: Negative for adenopathy and bleeding problem. Does not bruise/bleed easily.   Skin:  Negative for color change, flushing, itching and rash.   Musculoskeletal:  Negative for muscle cramps, muscle weakness, myalgias and stiffness.   Gastrointestinal:  Negative for abdominal pain, diarrhea, hematemesis, hematochezia, nausea and vomiting.   Genitourinary:  Negative for dysuria, frequency and nocturia.   Neurological:  Negative for focal weakness, loss of balance, numbness, paresthesias and seizures.   Psychiatric/Behavioral:  Negative for altered mental status, hallucinations and suicidal ideas.    Allergic/Immunologic: Negative for HIV " exposure, hives and persistent infections.       History  Past Medical History:   Diagnosis Date    Acute appendicitis with perforation, localized peritonitis, abscess, and gangrene 03/13/2024    Diabetes mellitus     Hypertension     Impaired mobility     Rupture of appendix 03/14/2024    Type 2 diabetes mellitus    ,   Past Surgical History:   Procedure Laterality Date    APPENDECTOMY N/A 3/13/2024    Procedure: APPENDECTOMY LAPAROSCOPIC;  Surgeon: Yazmin Zayas MD;  Location: [facility];  Service: General;  Laterality: N/A;    BELOW KNEE LEG AMPUTATION      EYE SURGERY     ,   Family History   Problem Relation Age of Onset    Diabetes Mother     Diabetes Father     Diabetes Sister    ,   Social History     Tobacco Use    Smoking status: Former     Types: Cigarettes    Smokeless tobacco: Never   Vaping Use    Vaping status: Never Used   Substance Use Topics    Alcohol use: Never    Drug use: Never   ,   Medications Prior to Admission   Medication Sig Dispense Refill Last Dose/Taking    atorvastatin (LIPITOR) 40 MG tablet Take 1 tablet by mouth Daily.   5/22/2025    dapagliflozin Propanediol (Farxiga) 10 MG tablet Take 10 mg by mouth Daily.   5/22/2025    furosemide (LASIX) 40 MG tablet Take 1 tablet by mouth At Night As Needed.   Past Week    lisinopril (PRINIVIL,ZESTRIL) 5 MG tablet Take 1 tablet by mouth Daily.   Past Week    glucagon (GLUCAGEN) 1 MG injection Inject 1 mg under the skin into the appropriate area as directed 1 (One) Time As Needed (severe hypoglycemia) for up to 1 dose. 1 each 0 Unknown    influenza vac split quad (FLUZONE,FLUARIX,AFLURIA,FLULAVAL) 0.5 ML suspension prefilled syringe injection  (Patient not taking: Reported on 5/23/2025)   Not Taking    Insulin Lispro, 1 Unit Dial, (HumaLOG KwikPen) 100 UNIT/ML solution pen-injector Inject 45 Units under the skin into the appropriate area as directed 3 (Three) Times a Day With Meals. Add additional correction 2 unit for every 50 mg/dl over  "150. Max daily dose 200 units. 180 mL 0 Unknown    Semaglutide, 2 MG/DOSE, (Ozempic, 2 MG/DOSE,) 8 MG/3ML solution pen-injector Inject 2 mg under the skin into the appropriate area as directed 1 (One) Time Per Week. (Patient not taking: Reported on 5/23/2025)   Not Taking    TIRZEPATIDE SC Inject  under the skin into the appropriate area as directed.       Toujeo Max SoloStar 300 UNIT/ML solution pen-injector injection Inject 114 Units under the skin into the appropriate area as directed Daily. Titrate 2 units every 3 days if fasting blood sugar is more than 140. Max daily dose 120 units. 36 mL 1 Unknown    and Allergies:  Duloxetine hcl    Objective:    Vital Sign Min/Max for last 24 hours  Temp  Min: 97.8 °F (36.6 °C)  Max: 98.5 °F (36.9 °C)   BP  Min: 113/79  Max: 146/93   Pulse  Min: 82  Max: 92   Resp  Min: 16  Max: 18   SpO2  Min: 95 %  Max: 99 %   No data recorded   Weight  Min: 127 kg (279 lb 15.8 oz)  Max: 127 kg (281 lb)     Flowsheet Rows      Flowsheet Row First Filed Value   Admission Height 182.9 cm (72\") Documented at 05/23/2025 0007   Admission Weight 127 kg (281 lb) Documented at 05/23/2025 0007                 Physical Exam:   Vitals and nursing note reviewed.   Constitutional:       Appearance: Not in distress. Chronically ill-appearing.   Neck:      Vascular: No JVR. JVD normal.   Pulmonary:      Effort: Pulmonary effort is normal.      Breath sounds: Normal breath sounds. No wheezing. No rhonchi. No rales.   Chest:      Chest wall: Not tender to palpatation.   Cardiovascular:      PMI at left midclavicular line. Normal rate. Regular rhythm. Normal S1. Normal S2.       Murmurs: There is no murmur.      No gallop.  No click. No rub.   Pulses:     Intact distal pulses.   Edema:     Peripheral edema absent.   Abdominal:      General: Bowel sounds are normal.      Palpations: Abdomen is soft.      Tenderness: There is no abdominal tenderness.   Musculoskeletal: Normal range of motion.         " General: No tenderness.      Comments: Left AKA Skin:     General: Skin is warm and dry.      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  Multiple shallow crusted ulcerations in the same area.   Neurological:      General: No focal deficit present.      Mental Status: Alert and oriented to person, place and time.         Results Review:   I reviewed the patient's new clinical results.  Results from last 7 days   Lab Units 05/23/25  0813 05/23/25  0016   WBC 10*3/mm3 9.17 8.58   HEMOGLOBIN g/dL 14.8 14.3   HEMATOCRIT % 43.3 42.6   PLATELETS 10*3/mm3 182 185     Results from last 7 days   Lab Units 05/23/25  0812 05/23/25  0016   SODIUM mmol/L 132* 136   POTASSIUM mmol/L 4.2 4.3   CHLORIDE mmol/L 100 101   CO2 mmol/L 21.7* 22.5   BUN mg/dL 18 19   CREATININE mg/dL 1.08 1.36*   GLUCOSE mg/dL 285* 218*   CALCIUM mg/dL 9.0 9.8     Lab Results   Lab Value Date/Time    TROPONINT 64 (C) 05/23/2025 0812    TROPONINT 79 (C) 05/23/2025 0139    TROPONINT 72 (C) 05/23/2025 0016    TROPONINT 48 (H) 03/12/2024 2242    TROPONINT 45 (H) 03/12/2024 2040             Assessment/Plan:      Chest pain, atypical    Essential hypertension    Type 2 diabetes mellitus with hyperglycemia, with long-term current use of insulin    Elevated troponin    Stage 3a chronic kidney disease    CHF (congestive heart failure)    Atypical chest pain      I have recommended invasive coronary angiography with interventional standby.  Mr. Mendez has been engaged in a patient level discussion explaining the rationale for invasive procedure.  The procedure of coronary angiography with catheter-based coronary revascularization has been explained in detail, using layman's terminology, including risks, benefits and alternatives.  He expresses understanding and desire to proceed.  Further recommendations will be predicated on the results of his catheterization findings.    I discussed the  patient's findings and my recommendations with patient    Cornelio FERREIRA. MD Katie  05/23/25  11:44 EDT            Electronically signed by Cornelio Wilson MD at 05/23/25 0495

## 2025-05-24 NOTE — NURSING NOTE
Pt. Leaving with EMS at this time for transfer to [facility]. All belongings, including prosthetic leg and personal walker, were taken with pt. Pt. With no complaints or further questions at this time. Dania was on the phone with Pt. As EMS arrived, and was updated on his transfer.

## 2025-06-16 ENCOUNTER — APPOINTMENT (OUTPATIENT)
Dept: GENERAL RADIOLOGY | Facility: HOSPITAL | Age: 60
End: 2025-06-16
Payer: MEDICARE

## 2025-06-16 ENCOUNTER — HOSPITAL ENCOUNTER (INPATIENT)
Facility: HOSPITAL | Age: 60
LOS: 1 days | Discharge: ANOTHER HOSPITAL | End: 2025-06-17
Attending: EMERGENCY MEDICINE | Admitting: INTERNAL MEDICINE
Payer: MEDICARE

## 2025-06-16 DIAGNOSIS — Z86.39 HISTORY OF DIABETES MELLITUS: ICD-10-CM

## 2025-06-16 DIAGNOSIS — N17.9 ACUTE KIDNEY INJURY: ICD-10-CM

## 2025-06-16 DIAGNOSIS — A41.9 ACUTE SEPSIS: Primary | ICD-10-CM

## 2025-06-16 DIAGNOSIS — Z86.79 HISTORY OF HYPERTENSION: ICD-10-CM

## 2025-06-16 DIAGNOSIS — I24.9 ACUTE CORONARY SYNDROME: ICD-10-CM

## 2025-06-16 PROBLEM — I25.2 OLD MI (MYOCARDIAL INFARCTION): Status: ACTIVE | Noted: 2025-05-23

## 2025-06-16 LAB
ALBUMIN SERPL-MCNC: 3.3 G/DL (ref 3.5–5.2)
ALBUMIN/GLOB SERPL: 0.8 G/DL
ALP SERPL-CCNC: 79 U/L (ref 39–117)
ALT SERPL W P-5'-P-CCNC: 29 U/L (ref 1–41)
AMPHET+METHAMPHET UR QL: NEGATIVE
AMPHETAMINES UR QL: NEGATIVE
ANION GAP SERPL CALCULATED.3IONS-SCNC: 16.9 MMOL/L (ref 5–15)
AST SERPL-CCNC: 37 U/L (ref 1–40)
BACTERIA UR QL AUTO: ABNORMAL /HPF
BARBITURATES UR QL SCN: NEGATIVE
BASOPHILS # BLD AUTO: 0.05 10*3/MM3 (ref 0–0.2)
BASOPHILS NFR BLD AUTO: 0.2 % (ref 0–1.5)
BENZODIAZ UR QL SCN: NEGATIVE
BILIRUB SERPL-MCNC: 1 MG/DL (ref 0–1.2)
BILIRUB UR QL STRIP: NEGATIVE
BUN SERPL-MCNC: 34 MG/DL (ref 6–20)
BUN/CREAT SERPL: 17.8 (ref 7–25)
BUPRENORPHINE SERPL-MCNC: NEGATIVE NG/ML
CALCIUM SPEC-SCNC: 8.5 MG/DL (ref 8.6–10.5)
CANNABINOIDS SERPL QL: NEGATIVE
CHLORIDE SERPL-SCNC: 92 MMOL/L (ref 98–107)
CLARITY UR: CLEAR
CO2 SERPL-SCNC: 17.1 MMOL/L (ref 22–29)
COCAINE UR QL: NEGATIVE
COLOR UR: YELLOW
CREAT SERPL-MCNC: 1.91 MG/DL (ref 0.76–1.27)
D-LACTATE SERPL-SCNC: 2 MMOL/L (ref 0.5–2)
DEPRECATED RDW RBC AUTO: 41.8 FL (ref 37–54)
EGFRCR SERPLBLD CKD-EPI 2021: 40.1 ML/MIN/1.73
EOSINOPHIL # BLD AUTO: 0 10*3/MM3 (ref 0–0.4)
EOSINOPHIL NFR BLD AUTO: 0 % (ref 0.3–6.2)
ERYTHROCYTE [DISTWIDTH] IN BLOOD BY AUTOMATED COUNT: 13.4 % (ref 12.3–15.4)
FENTANYL UR-MCNC: NEGATIVE NG/ML
FLUAV RNA RESP QL NAA+PROBE: NOT DETECTED
FLUBV RNA RESP QL NAA+PROBE: NOT DETECTED
GEN 5 1HR TROPONIN T REFLEX: 3086 NG/L
GLOBULIN UR ELPH-MCNC: 4 GM/DL
GLUCOSE BLDC GLUCOMTR-MCNC: 143 MG/DL (ref 70–130)
GLUCOSE SERPL-MCNC: 127 MG/DL (ref 65–99)
GLUCOSE UR STRIP-MCNC: NEGATIVE MG/DL
HCT VFR BLD AUTO: 34.9 % (ref 37.5–51)
HGB BLD-MCNC: 11.9 G/DL (ref 13–17.7)
HGB UR QL STRIP.AUTO: ABNORMAL
HYALINE CASTS UR QL AUTO: ABNORMAL /LPF
IMM GRANULOCYTES # BLD AUTO: 0.17 10*3/MM3 (ref 0–0.05)
IMM GRANULOCYTES NFR BLD AUTO: 0.7 % (ref 0–0.5)
KETONES UR QL STRIP: ABNORMAL
LEUKOCYTE ESTERASE UR QL STRIP.AUTO: NEGATIVE
LIPASE SERPL-CCNC: 7 U/L (ref 13–60)
LYMPHOCYTES # BLD AUTO: 0.42 10*3/MM3 (ref 0.7–3.1)
LYMPHOCYTES NFR BLD AUTO: 1.8 % (ref 19.6–45.3)
MCH RBC QN AUTO: 29 PG (ref 26.6–33)
MCHC RBC AUTO-ENTMCNC: 34.1 G/DL (ref 31.5–35.7)
MCV RBC AUTO: 85.1 FL (ref 79–97)
METHADONE UR QL SCN: NEGATIVE
MONOCYTES # BLD AUTO: 0.78 10*3/MM3 (ref 0.1–0.9)
MONOCYTES NFR BLD AUTO: 3.3 % (ref 5–12)
NEUTROPHILS NFR BLD AUTO: 22.06 10*3/MM3 (ref 1.7–7)
NEUTROPHILS NFR BLD AUTO: 94 % (ref 42.7–76)
NITRITE UR QL STRIP: NEGATIVE
NRBC BLD AUTO-RTO: 0 /100 WBC (ref 0–0.2)
NT-PROBNP SERPL-MCNC: ABNORMAL PG/ML (ref 0–900)
OPIATES UR QL: NEGATIVE
OXYCODONE UR QL SCN: NEGATIVE
PCP UR QL SCN: NEGATIVE
PH UR STRIP.AUTO: 5.5 [PH] (ref 5–8)
PLATELET # BLD AUTO: 212 10*3/MM3 (ref 140–450)
PMV BLD AUTO: 11 FL (ref 6–12)
POTASSIUM SERPL-SCNC: 4.9 MMOL/L (ref 3.5–5.2)
PROCALCITONIN SERPL-MCNC: 0.3 NG/ML (ref 0–0.25)
PROT SERPL-MCNC: 7.3 G/DL (ref 6–8.5)
PROT UR QL STRIP: ABNORMAL
RBC # BLD AUTO: 4.1 10*6/MM3 (ref 4.14–5.8)
RBC # UR STRIP: ABNORMAL /HPF
REF LAB TEST METHOD: ABNORMAL
SARS-COV-2 RNA RESP QL NAA+PROBE: NOT DETECTED
SODIUM SERPL-SCNC: 126 MMOL/L (ref 136–145)
SP GR UR STRIP: 1.02 (ref 1–1.03)
SQUAMOUS #/AREA URNS HPF: ABNORMAL /HPF
TRICYCLICS UR QL SCN: NEGATIVE
TROPONIN T % DELTA: -6
TROPONIN T NUMERIC DELTA: -204 NG/L
TROPONIN T SERPL HS-MCNC: 3290 NG/L
UROBILINOGEN UR QL STRIP: ABNORMAL
WBC # UR STRIP: ABNORMAL /HPF
WBC NRBC COR # BLD AUTO: 23.48 10*3/MM3 (ref 3.4–10.8)

## 2025-06-16 PROCEDURE — 25810000003 LACTATED RINGERS SOLUTION: Performed by: EMERGENCY MEDICINE

## 2025-06-16 PROCEDURE — 25810000003 SODIUM CHLORIDE 0.9 % SOLUTION: Performed by: EMERGENCY MEDICINE

## 2025-06-16 PROCEDURE — 99222 1ST HOSP IP/OBS MODERATE 55: CPT | Performed by: INTERNAL MEDICINE

## 2025-06-16 PROCEDURE — 83880 ASSAY OF NATRIURETIC PEPTIDE: CPT | Performed by: EMERGENCY MEDICINE

## 2025-06-16 PROCEDURE — 80307 DRUG TEST PRSMV CHEM ANLYZR: CPT | Performed by: INTERNAL MEDICINE

## 2025-06-16 PROCEDURE — 99285 EMERGENCY DEPT VISIT HI MDM: CPT | Performed by: EMERGENCY MEDICINE

## 2025-06-16 PROCEDURE — 36415 COLL VENOUS BLD VENIPUNCTURE: CPT

## 2025-06-16 PROCEDURE — C1769 GUIDE WIRE: HCPCS | Performed by: INTERNAL MEDICINE

## 2025-06-16 PROCEDURE — B2111ZZ FLUOROSCOPY OF MULTIPLE CORONARY ARTERIES USING LOW OSMOLAR CONTRAST: ICD-10-PCS | Performed by: INTERNAL MEDICINE

## 2025-06-16 PROCEDURE — 93458 L HRT ARTERY/VENTRICLE ANGIO: CPT | Performed by: INTERNAL MEDICINE

## 2025-06-16 PROCEDURE — 25010000002 ONDANSETRON PER 1 MG: Performed by: EMERGENCY MEDICINE

## 2025-06-16 PROCEDURE — 81001 URINALYSIS AUTO W/SCOPE: CPT | Performed by: EMERGENCY MEDICINE

## 2025-06-16 PROCEDURE — 25010000002 LIDOCAINE 1 % SOLUTION: Performed by: INTERNAL MEDICINE

## 2025-06-16 PROCEDURE — 25010000002 PIPERACILLIN SOD-TAZOBACTAM PER 1 G: Performed by: EMERGENCY MEDICINE

## 2025-06-16 PROCEDURE — C1894 INTRO/SHEATH, NON-LASER: HCPCS | Performed by: INTERNAL MEDICINE

## 2025-06-16 PROCEDURE — 82948 REAGENT STRIP/BLOOD GLUCOSE: CPT

## 2025-06-16 PROCEDURE — 83605 ASSAY OF LACTIC ACID: CPT | Performed by: EMERGENCY MEDICINE

## 2025-06-16 PROCEDURE — C1760 CLOSURE DEV, VASC: HCPCS | Performed by: INTERNAL MEDICINE

## 2025-06-16 PROCEDURE — 85025 COMPLETE CBC W/AUTO DIFF WBC: CPT | Performed by: EMERGENCY MEDICINE

## 2025-06-16 PROCEDURE — 4A023N7 MEASUREMENT OF CARDIAC SAMPLING AND PRESSURE, LEFT HEART, PERCUTANEOUS APPROACH: ICD-10-PCS | Performed by: INTERNAL MEDICINE

## 2025-06-16 PROCEDURE — 25010000002 FENTANYL CITRATE (PF) 50 MCG/ML SOLUTION: Performed by: INTERNAL MEDICINE

## 2025-06-16 PROCEDURE — 87636 SARSCOV2 & INF A&B AMP PRB: CPT | Performed by: EMERGENCY MEDICINE

## 2025-06-16 PROCEDURE — 25510000001 IOPAMIDOL PER 1 ML: Performed by: INTERNAL MEDICINE

## 2025-06-16 PROCEDURE — C1887 CATHETER, GUIDING: HCPCS | Performed by: INTERNAL MEDICINE

## 2025-06-16 PROCEDURE — 25010000002 VANCOMYCIN 5 G RECONSTITUTED SOLUTION: Performed by: EMERGENCY MEDICINE

## 2025-06-16 PROCEDURE — 25010000002 HEPARIN (PORCINE) PER 1000 UNITS: Performed by: INTERNAL MEDICINE

## 2025-06-16 PROCEDURE — 71045 X-RAY EXAM CHEST 1 VIEW: CPT

## 2025-06-16 PROCEDURE — 93005 ELECTROCARDIOGRAM TRACING: CPT | Performed by: EMERGENCY MEDICINE

## 2025-06-16 PROCEDURE — 87040 BLOOD CULTURE FOR BACTERIA: CPT | Performed by: EMERGENCY MEDICINE

## 2025-06-16 PROCEDURE — 80053 COMPREHEN METABOLIC PANEL: CPT | Performed by: EMERGENCY MEDICINE

## 2025-06-16 PROCEDURE — 84484 ASSAY OF TROPONIN QUANT: CPT | Performed by: EMERGENCY MEDICINE

## 2025-06-16 PROCEDURE — 84145 PROCALCITONIN (PCT): CPT | Performed by: EMERGENCY MEDICINE

## 2025-06-16 PROCEDURE — P9612 CATHETERIZE FOR URINE SPEC: HCPCS

## 2025-06-16 PROCEDURE — 83690 ASSAY OF LIPASE: CPT | Performed by: EMERGENCY MEDICINE

## 2025-06-16 RX ORDER — ONDANSETRON 4 MG/1
4 TABLET, ORALLY DISINTEGRATING ORAL EVERY 6 HOURS PRN
Status: DISCONTINUED | OUTPATIENT
Start: 2025-06-16 | End: 2025-06-17 | Stop reason: HOSPADM

## 2025-06-16 RX ORDER — ATORVASTATIN CALCIUM 80 MG/1
80 TABLET, FILM COATED ORAL DAILY
Status: DISCONTINUED | OUTPATIENT
Start: 2025-06-17 | End: 2025-06-17 | Stop reason: HOSPADM

## 2025-06-16 RX ORDER — DEXTROSE MONOHYDRATE 25 G/50ML
10-50 INJECTION, SOLUTION INTRAVENOUS
Status: DISCONTINUED | OUTPATIENT
Start: 2025-06-16 | End: 2025-06-17 | Stop reason: HOSPADM

## 2025-06-16 RX ORDER — BISACODYL 10 MG
10 SUPPOSITORY, RECTAL RECTAL DAILY PRN
Status: DISCONTINUED | OUTPATIENT
Start: 2025-06-16 | End: 2025-06-17 | Stop reason: HOSPADM

## 2025-06-16 RX ORDER — DIPHENHYDRAMINE HYDROCHLORIDE 50 MG/ML
25 INJECTION, SOLUTION INTRAMUSCULAR; INTRAVENOUS EVERY 6 HOURS PRN
Status: DISCONTINUED | OUTPATIENT
Start: 2025-06-16 | End: 2025-06-17 | Stop reason: HOSPADM

## 2025-06-16 RX ORDER — TICAGRELOR 90 MG/1
90 TABLET, FILM COATED ORAL 2 TIMES DAILY
Status: DISCONTINUED | OUTPATIENT
Start: 2025-06-17 | End: 2025-06-17 | Stop reason: HOSPADM

## 2025-06-16 RX ORDER — ACETAMINOPHEN 325 MG/1
650 TABLET ORAL EVERY 4 HOURS PRN
Status: DISCONTINUED | OUTPATIENT
Start: 2025-06-16 | End: 2025-06-17 | Stop reason: HOSPADM

## 2025-06-16 RX ORDER — ALPRAZOLAM 0.25 MG
0.25 TABLET ORAL 3 TIMES DAILY PRN
Status: DISCONTINUED | OUTPATIENT
Start: 2025-06-16 | End: 2025-06-17 | Stop reason: HOSPADM

## 2025-06-16 RX ORDER — FENTANYL CITRATE 50 UG/ML
INJECTION, SOLUTION INTRAMUSCULAR; INTRAVENOUS
Status: DISCONTINUED | OUTPATIENT
Start: 2025-06-16 | End: 2025-06-16 | Stop reason: HOSPADM

## 2025-06-16 RX ORDER — INSULIN LISPRO 100 [IU]/ML
1-200 INJECTION, SOLUTION INTRAVENOUS; SUBCUTANEOUS
Status: DISCONTINUED | OUTPATIENT
Start: 2025-06-17 | End: 2025-06-17 | Stop reason: HOSPADM

## 2025-06-16 RX ORDER — HYDROCODONE BITARTRATE AND ACETAMINOPHEN 5; 325 MG/1; MG/1
1 TABLET ORAL EVERY 4 HOURS PRN
Status: DISCONTINUED | OUTPATIENT
Start: 2025-06-16 | End: 2025-06-17 | Stop reason: HOSPADM

## 2025-06-16 RX ORDER — HEPARIN SODIUM 1000 [USP'U]/ML
INJECTION, SOLUTION INTRAVENOUS; SUBCUTANEOUS
Status: DISCONTINUED | OUTPATIENT
Start: 2025-06-16 | End: 2025-06-16 | Stop reason: HOSPADM

## 2025-06-16 RX ORDER — SODIUM CHLORIDE 0.9 % (FLUSH) 0.9 %
10 SYRINGE (ML) INJECTION EVERY 12 HOURS SCHEDULED
Status: DISCONTINUED | OUTPATIENT
Start: 2025-06-17 | End: 2025-06-17 | Stop reason: HOSPADM

## 2025-06-16 RX ORDER — NALOXONE HCL 0.4 MG/ML
0.4 VIAL (ML) INJECTION
Status: DISCONTINUED | OUTPATIENT
Start: 2025-06-16 | End: 2025-06-17 | Stop reason: HOSPADM

## 2025-06-16 RX ORDER — HEPARIN SODIUM 5000 [USP'U]/ML
5000 INJECTION, SOLUTION INTRAVENOUS; SUBCUTANEOUS EVERY 12 HOURS SCHEDULED
Status: DISCONTINUED | OUTPATIENT
Start: 2025-06-17 | End: 2025-06-17 | Stop reason: HOSPADM

## 2025-06-16 RX ORDER — NICOTINE POLACRILEX 4 MG
15 LOZENGE BUCCAL
Status: DISCONTINUED | OUTPATIENT
Start: 2025-06-16 | End: 2025-06-17 | Stop reason: HOSPADM

## 2025-06-16 RX ORDER — IBUPROFEN 600 MG/1
1 TABLET ORAL
Status: DISCONTINUED | OUTPATIENT
Start: 2025-06-16 | End: 2025-06-17 | Stop reason: HOSPADM

## 2025-06-16 RX ORDER — LIDOCAINE HYDROCHLORIDE 10 MG/ML
INJECTION, SOLUTION INFILTRATION; PERINEURAL
Status: DISCONTINUED | OUTPATIENT
Start: 2025-06-16 | End: 2025-06-16 | Stop reason: HOSPADM

## 2025-06-16 RX ORDER — ASPIRIN 81 MG/1
324 TABLET, CHEWABLE ORAL ONCE
Status: COMPLETED | OUTPATIENT
Start: 2025-06-16 | End: 2025-06-16

## 2025-06-16 RX ORDER — ACETAMINOPHEN 500 MG
1000 TABLET ORAL ONCE
Status: COMPLETED | OUTPATIENT
Start: 2025-06-16 | End: 2025-06-16

## 2025-06-16 RX ORDER — SODIUM CHLORIDE 0.9 % (FLUSH) 0.9 %
10 SYRINGE (ML) INJECTION AS NEEDED
Status: DISCONTINUED | OUTPATIENT
Start: 2025-06-16 | End: 2025-06-17 | Stop reason: HOSPADM

## 2025-06-16 RX ORDER — ONDANSETRON 2 MG/ML
8 INJECTION INTRAMUSCULAR; INTRAVENOUS ONCE
Status: COMPLETED | OUTPATIENT
Start: 2025-06-16 | End: 2025-06-16

## 2025-06-16 RX ORDER — ONDANSETRON 2 MG/ML
4 INJECTION INTRAMUSCULAR; INTRAVENOUS EVERY 6 HOURS PRN
Status: DISCONTINUED | OUTPATIENT
Start: 2025-06-16 | End: 2025-06-17 | Stop reason: HOSPADM

## 2025-06-16 RX ORDER — IOPAMIDOL 755 MG/ML
INJECTION, SOLUTION INTRAVASCULAR
Status: DISCONTINUED | OUTPATIENT
Start: 2025-06-16 | End: 2025-06-16 | Stop reason: HOSPADM

## 2025-06-16 RX ORDER — TEMAZEPAM 15 MG/1
15 CAPSULE ORAL NIGHTLY PRN
Status: DISCONTINUED | OUTPATIENT
Start: 2025-06-16 | End: 2025-06-17 | Stop reason: HOSPADM

## 2025-06-16 RX ORDER — AMOXICILLIN 250 MG
2 CAPSULE ORAL 2 TIMES DAILY
Status: DISCONTINUED | OUTPATIENT
Start: 2025-06-17 | End: 2025-06-17 | Stop reason: HOSPADM

## 2025-06-16 RX ORDER — POLYETHYLENE GLYCOL 3350 17 G/17G
17 POWDER, FOR SOLUTION ORAL DAILY PRN
Status: DISCONTINUED | OUTPATIENT
Start: 2025-06-16 | End: 2025-06-17 | Stop reason: HOSPADM

## 2025-06-16 RX ORDER — NITROGLYCERIN 0.4 MG/1
0.4 TABLET SUBLINGUAL
Status: DISCONTINUED | OUTPATIENT
Start: 2025-06-16 | End: 2025-06-17 | Stop reason: HOSPADM

## 2025-06-16 RX ORDER — BISACODYL 5 MG/1
5 TABLET, DELAYED RELEASE ORAL DAILY PRN
Status: DISCONTINUED | OUTPATIENT
Start: 2025-06-16 | End: 2025-06-17 | Stop reason: HOSPADM

## 2025-06-16 RX ORDER — INSULIN LISPRO 100 [IU]/ML
1-200 INJECTION, SOLUTION INTRAVENOUS; SUBCUTANEOUS AS NEEDED
Status: DISCONTINUED | OUTPATIENT
Start: 2025-06-16 | End: 2025-06-17 | Stop reason: HOSPADM

## 2025-06-16 RX ORDER — ASPIRIN 81 MG/1
81 TABLET ORAL DAILY
Status: DISCONTINUED | OUTPATIENT
Start: 2025-06-17 | End: 2025-06-17 | Stop reason: HOSPADM

## 2025-06-16 RX ORDER — SODIUM CHLORIDE 9 MG/ML
10 INJECTION, SOLUTION INTRAVENOUS CONTINUOUS
Status: DISCONTINUED | OUTPATIENT
Start: 2025-06-17 | End: 2025-06-17 | Stop reason: HOSPADM

## 2025-06-16 RX ORDER — SODIUM CHLORIDE 9 MG/ML
40 INJECTION, SOLUTION INTRAVENOUS AS NEEDED
Status: DISCONTINUED | OUTPATIENT
Start: 2025-06-16 | End: 2025-06-17 | Stop reason: HOSPADM

## 2025-06-16 RX ADMIN — Medication 2500 MG: at 21:50

## 2025-06-16 RX ADMIN — ACETAMINOPHEN 1000 MG: 500 TABLET, FILM COATED ORAL at 20:01

## 2025-06-16 RX ADMIN — PIPERACILLIN AND TAZOBACTAM 4.5 G: 4; .5 INJECTION, POWDER, FOR SOLUTION INTRAVENOUS at 21:12

## 2025-06-16 RX ADMIN — ASPIRIN 81 MG CHEWABLE TABLET 324 MG: 81 TABLET CHEWABLE at 20:02

## 2025-06-16 RX ADMIN — ONDANSETRON 8 MG: 2 INJECTION INTRAMUSCULAR; INTRAVENOUS at 20:02

## 2025-06-16 RX ADMIN — SODIUM CHLORIDE, POTASSIUM CHLORIDE, SODIUM LACTATE AND CALCIUM CHLORIDE 1000 ML: 600; 310; 30; 20 INJECTION, SOLUTION INTRAVENOUS at 21:09

## 2025-06-16 RX ADMIN — SODIUM CHLORIDE 1000 ML: 9 INJECTION, SOLUTION INTRAVENOUS at 19:59

## 2025-06-16 NOTE — ED PROVIDER NOTES
[place]    EMERGENCY DEPARTMENT ENCOUNTER      Pt Name: Deep Mendez  MRN: [id number]  YOB: 1965  Date of evaluation: 6/16/2025  Provider: Roddy Mcpherson MD    CHIEF COMPLAINT       Chief Complaint   Patient presents with    Weakness - Generalized         HISTORY OF PRESENT ILLNESS   Deep Mendez is a 59 y.o. male who presents to the emergency department with complaint of severe generalized weakness over the course the past day along with uncontrolled nausea.  Patient recently admitted with stent placement following non-STEMI.  He denies any chest pain or shortness of breath today, has not had any vomiting, abdominal pain, diarrhea, or urinary symptoms.      Nursing notes were reviewed.    REVIEW OF SYSTEMS     ROS:  A chief complaint appropriate review of systems was completed and is negative except as noted in the HPI.      PAST MEDICAL HISTORY     Past Medical History:   Diagnosis Date    Acute appendicitis with perforation, localized peritonitis, abscess, and gangrene 03/13/2024    Diabetes mellitus     Hypertension     Impaired mobility     Rupture of appendix 03/14/2024    Type 2 diabetes mellitus          SURGICAL HISTORY       Past Surgical History:   Procedure Laterality Date    APPENDECTOMY N/A 3/13/2024    Procedure: APPENDECTOMY LAPAROSCOPIC;  Surgeon: Yazmin Zayas MD;  Location: [facility] OR;  Service: General;  Laterality: N/A;    BELOW KNEE LEG AMPUTATION      CARDIAC CATHETERIZATION N/A 5/23/2025    Procedure: Coronary angiography;  Surgeon: Cornelio Wilson MD;  Location: [facility] CATH INVASIVE LOCATION;  Service: Cardiology;  Laterality: N/A;    CARDIAC CATHETERIZATION N/A 6/16/2025    Procedure: Left Heart Cath;  Surgeon: Cornelio Wilsno MD;  Location: [facility] CATH INVASIVE LOCATION;  Service: Cardiology;  Laterality: N/A;    EYE SURGERY           CURRENT MEDICATIONS       Current Facility-Administered Medications:     acetaminophen (TYLENOL) tablet 650 mg, 650 mg,  Oral, Q4H PRN, Cornelio Wilson MD    ALPRAZolam (XANAX) tablet 0.25 mg, 0.25 mg, Oral, TID PRN, Cornelio Wilson MD, 0.25 mg at 06/17/25 0042    ampicillin-sulbactam (UNASYN) 3 g in sodium chloride 0.9 % 100 mL IVPB-VTB, 3 g, Intravenous, Q6H, Waqar Swan DO    aspirin EC tablet 81 mg, 81 mg, Oral, Daily, Cornelio Wilson MD, 81 mg at 06/17/25 0853    atorvastatin (LIPITOR) tablet 80 mg, 80 mg, Oral, Daily, Cornelio Wilson MD, 80 mg at 06/17/25 0853    sennosides-docusate (PERICOLACE) 8.6-50 MG per tablet 2 tablet, 2 tablet, Oral, BID, 2 tablet at 06/17/25 0031 **AND** polyethylene glycol (MIRALAX) packet 17 g, 17 g, Oral, Daily PRN **AND** bisacodyl (DULCOLAX) EC tablet 5 mg, 5 mg, Oral, Daily PRN **AND** bisacodyl (DULCOLAX) suppository 10 mg, 10 mg, Rectal, Daily PRN, Teddy Rogel DO    dextrose (D50W) (25 g/50 mL) IV injection 10-50 mL, 10-50 mL, Intravenous, Q15 Min PRN, Teddy Rogel DO    dextrose (GLUTOSE) oral gel 15 g, 15 g, Oral, Q15 Min PRN, Teddy Rogel DO    diphenhydrAMINE (BENADRYL) injection 25 mg, 25 mg, Intravenous, Q6H PRN, Cornelio Wilson MD    Glucagon (GLUCAGEN) injection 1 mg, 1 mg, Intramuscular, Q15 Min PRN, Teddy Rogel DO    heparin (porcine) 5000 UNIT/ML injection 5,000 Units, 5,000 Units, Subcutaneous, Q12H, Teddy Rogel DO, 5,000 Units at 06/17/25 0853    HYDROcodone-acetaminophen (NORCO) 5-325 MG per tablet 1 tablet, 1 tablet, Oral, Q4H PRN, Cornelio Wilson MD, 1 tablet at 06/17/25 0859    insulin glargine (LANTUS, SEMGLEE) injection 1-200 Units, 1-200 Units, Subcutaneous, Nightly - Juan F Harris Morgan, DO, 30 Units at 06/17/25 0032    insulin lispro (humaLOG) injection 1-200 Units, 1-200 Units, Subcutaneous, 4x Daily With Meals & Nightly, Teddy Rogel DO, 5 Units at 06/17/25 1224    insulin lispro (humaLOG) injection 1-200 Units, 1-200 Units, Subcutaneous, PRN, Teddy Rogel DO, 1 Units at 06/17/25 0032    Lidocaine 4 % 1  patch, 1 patch, Transdermal, Q24H, Waqar Swan DO, 1 patch at 06/17/25 1223    morphine injection 4 mg, 4 mg, Intravenous, Q1H PRN **AND** naloxone (NARCAN) injection 0.4 mg, 0.4 mg, Intravenous, Q5 Min PRN, Cornelio Wilson MD    mupirocin (BACTROBAN) 2 % nasal ointment 1 Application, 1 Application, Each Nare, BID, Teddy Rogel DO, 1 Application at 06/17/25 0856    nitroglycerin (NITROSTAT) SL tablet 0.4 mg, 0.4 mg, Sublingual, Q5 Min PRN, Teddy Rogel DO    ondansetron ODT (ZOFRAN-ODT) disintegrating tablet 4 mg, 4 mg, Oral, Q6H PRN **OR** ondansetron (ZOFRAN) injection 4 mg, 4 mg, Intravenous, Q6H PRN, Cornelio Wilson MD, 4 mg at 06/17/25 0042    ondansetron ODT (ZOFRAN-ODT) disintegrating tablet 4 mg, 4 mg, Oral, Q6H PRN **OR** ondansetron (ZOFRAN) injection 4 mg, 4 mg, Intravenous, Q6H PRN, Teddy Rogel DO    sodium chloride 0.9 % flush 10 mL, 10 mL, Intravenous, Q12H, Teddy Rogel DO, 10 mL at 06/17/25 0854    sodium chloride 0.9 % flush 10 mL, 10 mL, Intravenous, PRN, Teddy Rogel DO    sodium chloride 0.9 % infusion 40 mL, 40 mL, Intravenous, PRN, Teddy Rogel DO    sodium chloride 0.9 % infusion, 10 mL/hr, Intravenous, Continuous, Cornelio Wilson MD, Last Rate: 10 mL/hr at 06/17/25 0033, 10 mL/hr at 06/17/25 0033    temazepam (RESTORIL) capsule 15 mg, 15 mg, Oral, Nightly PRN, Cornelio Wilson MD    ticagrelor (BRILINTA) tablet 90 mg, 90 mg, Oral, BID, Cornelio Wilson MD, 90 mg at 06/17/25 0856    Current Outpatient Medications:     [START ON 6/18/2025] aspirin 81 MG EC tablet, Take 1 tablet by mouth Daily., Disp: , Rfl:     Lidocaine 4 %, Place 1 patch on the skin as directed by provider Daily for 30 days. Remove & Discard patch within 12 hours or as directed by MD, Disp: 30 patch, Rfl: 0    ticagrelor (BRILINTA) 90 MG tablet tablet, Take 1 tablet by mouth 2 (Two) Times a Day., Disp: 60 tablet, Rfl: 0    atorvastatin (LIPITOR) 80 MG tablet, Take 1 tablet by  mouth Daily. (Patient taking differently: Take 0.5 tablets by mouth Daily.), Disp: , Rfl:     dapagliflozin Propanediol (Farxiga) 10 MG tablet, Take 10 mg by mouth Daily., Disp: , Rfl:     furosemide (LASIX) 40 MG tablet, Take 1 tablet by mouth At Night As Needed., Disp: , Rfl:     glucagon (GLUCAGEN) 1 MG injection, Inject 1 mg under the skin into the appropriate area as directed 1 (One) Time As Needed (severe hypoglycemia) for up to 1 dose., Disp: 1 each, Rfl: 0    Insulin Lispro, 1 Unit Dial, (HumaLOG KwikPen) 100 UNIT/ML solution pen-injector, Inject 45 Units under the skin into the appropriate area as directed 3 (Three) Times a Day With Meals. Add additional correction 2 unit for every 50 mg/dl over 150. Max daily dose 200 units., Disp: 180 mL, Rfl: 0    metoprolol succinate XL (TOPROL-XL) 25 MG 24 hr tablet, Take 1 tablet by mouth Daily., Disp: , Rfl:     Niacin ER 1000 MG tablet controlled-release, Take 1,000 mg by mouth Daily., Disp: , Rfl:     ticagrelor (BRILINTA) 90 MG tablet tablet, Take 1 tablet by mouth 2 (Two) Times a Day., Disp: , Rfl:     TIRZEPATIDE SC, Inject  under the skin into the appropriate area as directed., Disp: , Rfl:     Toujeo Max SoloStar 300 UNIT/ML solution pen-injector injection, Inject 114 Units under the skin into the appropriate area as directed Daily. Titrate 2 units every 3 days if fasting blood sugar is more than 140. Max daily dose 120 units., Disp: 36 mL, Rfl: 1    valsartan (DIOVAN) 40 MG tablet, Take 1 tablet by mouth Daily., Disp: , Rfl:     ALLERGIES     Duloxetine hcl    FAMILY HISTORY       Family History   Problem Relation Age of Onset    Diabetes Mother     Diabetes Father     Diabetes Sister           SOCIAL HISTORY       Social History     Socioeconomic History    Marital status: Single   Tobacco Use    Smoking status: Former     Types: Cigarettes    Smokeless tobacco: Never   Vaping Use    Vaping status: Never Used   Substance and Sexual Activity    Alcohol use:  Never    Drug use: Never    Sexual activity: Defer         PHYSICAL EXAM    (up to 7 for level 4, 8 or more for level 5)     Vitals:    06/17/25 1400 06/17/25 1500 06/17/25 1600 06/17/25 1700   BP: 120/77 160/95 (!) 148/104 126/89   BP Location:       Patient Position:       Pulse: 87 93 93 93   Resp:       Temp:   98 °F (36.7 °C)    TempSrc:   Oral    SpO2: 95% (!) 80% 97% 100%   Weight:       Height:           General: Ill-appearing  HEENT: Conjunctivae normal.  Neck: Trachea midline.  Cardiac: Tachycardic, regular rhythm, no murmurs, rubs, or gallops  Lungs: Lungs are clear to auscultation, there is no wheezing, rhonchi, or rales. There is no use of accessory muscles.  Abdomen: Abdomen is soft, nontender, nondistended. There are no firm or pulsatile masses, no rebound rigidity or guarding.   Musculoskeletal: No deformity.  Neuro: Alert   Dermatology: Skin is warm and dry  Psych: Mentation is grossly normal, cognition is grossly normal. Affect is appropriate.        DIAGNOSTIC RESULTS     EKG: All EKGs are interpreted by the Emergency Department Physician who either signs or Co-signs this chart in the absence of a cardiologist.    Telemetry Scan   Final Result      Telemetry Scan   Final Result      ECG 12 Lead Chest Pain   Final Result            RADIOLOGY:   [x] Radiologist's Report Reviewed:  CT Chest Without Contrast Diagnostic   Final Result   IMPRESSION:      1. Low lung volumes.      2. Dependent atelectasis or peripheral infiltrates/pneumonia involving the lower lobes.      3. Mild mediastinal lymphadenopathy may be reactive.      4. Sequela of prior granulomatous infection.      5. Other findings above.      Authenticated and Electronically Signed by Jorge Yuen MD on   06/17/2025 05:07:42 AM      XR Chest 1 View   Final Result   Poor inspiration with atelectasis.                           Images were reviewed, interpreted, and dictated by Dr. Aramis Dawson MD   Transcribed by Jovany Dwyer PA-C.        This report was signed and finalized on 6/17/2025 8:22 AM by Aramis Dawson MD.              I ordered and independently reviewed the above noted radiographic studies.        LABS:    I have reviewed and interpreted all of the currently available lab results from this visit (if applicable):  Results for orders placed or performed during the hospital encounter of 06/16/25   POC Glucose Once    Collection Time: 06/16/25  7:29 PM    Specimen: Blood   Result Value Ref Range    Glucose 143 (H) 70 - 130 mg/dL   Urinalysis With Culture If Indicated - Straight Cath    Collection Time: 06/16/25  7:39 PM    Specimen: Straight Cath; Urine   Result Value Ref Range    Color, UA Yellow Yellow, Straw    Appearance, UA Clear Clear    pH, UA 5.5 5.0 - 8.0    Specific Gravity, UA 1.016 1.005 - 1.030    Glucose, UA Negative Negative    Ketones, UA Trace (A) Negative    Bilirubin, UA Negative Negative    Blood, UA Trace (A) Negative    Protein,  mg/dL (2+) (A) Negative    Leuk Esterase, UA Negative Negative    Nitrite, UA Negative Negative    Urobilinogen, UA 1.0 E.U./dL 0.2 - 1.0 E.U./dL   Urinalysis, Microscopic Only - Straight Cath    Collection Time: 06/16/25  7:39 PM    Specimen: Straight Cath; Urine   Result Value Ref Range    RBC, UA 0-2 None Seen, 0-2 /HPF    WBC, UA None Seen None Seen, 0-2 /HPF    Bacteria, UA Trace (A) None Seen /HPF    Squamous Epithelial Cells, UA 3-6 (A) None Seen, 0-2 /HPF    Hyaline Casts, UA None Seen None Seen /LPF    Methodology Manual Light Microscopy    Urine Drug Screen - Straight Cath    Collection Time: 06/16/25  7:39 PM    Specimen: Straight Cath; Urine   Result Value Ref Range    THC, Screen, Urine Negative Negative    Phencyclidine (PCP), Urine Negative Negative    Cocaine Screen, Urine Negative Negative    Methamphetamine, Ur Negative Negative    Opiate Screen Negative Negative    Amphetamine Screen, Urine Negative Negative    Benzodiazepine Screen, Urine Negative Negative     Tricyclic Antidepressants Screen Negative Negative    Methadone Screen, Urine Negative Negative    Barbiturates Screen, Urine Negative Negative    Oxycodone Screen, Urine Negative Negative    Buprenorphine, Screen, Urine Negative Negative   Fentanyl, Urine - Straight Cath    Collection Time: 06/16/25  7:39 PM    Specimen: Straight Cath; Urine   Result Value Ref Range    Fentanyl, Urine Negative Negative   COVID-19 and FLU A/B PCR, 1 HR TAT - Swab, Nasopharynx    Collection Time: 06/16/25  7:46 PM    Specimen: Nasopharynx; Swab   Result Value Ref Range    COVID19 Not Detected Not Detected - Ref. Range    Influenza A PCR Not Detected Not Detected    Influenza B PCR Not Detected Not Detected   Comprehensive Metabolic Panel    Collection Time: 06/16/25  7:50 PM    Specimen: Arm, Left; Blood   Result Value Ref Range    Glucose 127 (H) 65 - 99 mg/dL    BUN 34.0 (H) 6.0 - 20.0 mg/dL    Creatinine 1.91 (H) 0.76 - 1.27 mg/dL    Sodium 126 (L) 136 - 145 mmol/L    Potassium 4.9 3.5 - 5.2 mmol/L    Chloride 92 (L) 98 - 107 mmol/L    CO2 17.1 (L) 22.0 - 29.0 mmol/L    Calcium 8.5 (L) 8.6 - 10.5 mg/dL    Total Protein 7.3 6.0 - 8.5 g/dL    Albumin 3.3 (L) 3.5 - 5.2 g/dL    ALT (SGPT) 29 1 - 41 U/L    AST (SGOT) 37 1 - 40 U/L    Alkaline Phosphatase 79 39 - 117 U/L    Total Bilirubin 1.0 0.0 - 1.2 mg/dL    Globulin 4.0 gm/dL    A/G Ratio 0.8 g/dL    BUN/Creatinine Ratio 17.8 7.0 - 25.0    Anion Gap 16.9 (H) 5.0 - 15.0 mmol/L    eGFR 40.1 (L) >60.0 mL/min/1.73   High Sensitivity Troponin T    Collection Time: 06/16/25  7:50 PM    Specimen: Arm, Left; Blood   Result Value Ref Range    HS Troponin T 3,290 (C) <22 ng/L   BNP    Collection Time: 06/16/25  7:50 PM    Specimen: Arm, Left; Blood   Result Value Ref Range    proBNP 13,479.0 (H) 0.0 - 900.0 pg/mL   Lactic Acid, Plasma    Collection Time: 06/16/25  7:50 PM    Specimen: Arm, Left; Blood   Result Value Ref Range    Lactate 2.0 0.5 - 2.0 mmol/L   Procalcitonin    Collection  Time: 06/16/25  7:50 PM    Specimen: Arm, Left; Blood   Result Value Ref Range    Procalcitonin 0.30 (H) 0.00 - 0.25 ng/mL   Lipase    Collection Time: 06/16/25  7:50 PM    Specimen: Arm, Left; Blood   Result Value Ref Range    Lipase 7 (L) 13 - 60 U/L   CBC Auto Differential    Collection Time: 06/16/25  7:50 PM    Specimen: Arm, Left; Blood   Result Value Ref Range    WBC 23.48 (H) 3.40 - 10.80 10*3/mm3    RBC 4.10 (L) 4.14 - 5.80 10*6/mm3    Hemoglobin 11.9 (L) 13.0 - 17.7 g/dL    Hematocrit 34.9 (L) 37.5 - 51.0 %    MCV 85.1 79.0 - 97.0 fL    MCH 29.0 26.6 - 33.0 pg    MCHC 34.1 31.5 - 35.7 g/dL    RDW 13.4 12.3 - 15.4 %    RDW-SD 41.8 37.0 - 54.0 fl    MPV 11.0 6.0 - 12.0 fL    Platelets 212 140 - 450 10*3/mm3    Neutrophil % 94.0 (H) 42.7 - 76.0 %    Lymphocyte % 1.8 (L) 19.6 - 45.3 %    Monocyte % 3.3 (L) 5.0 - 12.0 %    Eosinophil % 0.0 (L) 0.3 - 6.2 %    Basophil % 0.2 0.0 - 1.5 %    Immature Grans % 0.7 (H) 0.0 - 0.5 %    Neutrophils, Absolute 22.06 (H) 1.70 - 7.00 10*3/mm3    Lymphocytes, Absolute 0.42 (L) 0.70 - 3.10 10*3/mm3    Monocytes, Absolute 0.78 0.10 - 0.90 10*3/mm3    Eosinophils, Absolute 0.00 0.00 - 0.40 10*3/mm3    Basophils, Absolute 0.05 0.00 - 0.20 10*3/mm3    Immature Grans, Absolute 0.17 (H) 0.00 - 0.05 10*3/mm3    nRBC 0.0 0.0 - 0.2 /100 WBC   High Sensitivity Troponin T 1Hr    Collection Time: 06/16/25  9:21 PM    Specimen: Blood   Result Value Ref Range    HS Troponin T 3,086 (C) <22 ng/L    Troponin T Numeric Delta -204 ng/L    Troponin T % Delta -6 Abnormal if >/= 20%   POC Glucose Once    Collection Time: 06/17/25 12:02 AM    Specimen: Blood   Result Value Ref Range    Glucose 189 (H) 70 - 130 mg/dL   MRSA Screen, PCR (Inpatient) - Swab, Nares    Collection Time: 06/17/25  1:21 AM    Specimen: Nares; Swab   Result Value Ref Range    MRSA PCR No MRSA Detected No MRSA Detected   Hemoglobin A1c    Collection Time: 06/17/25  4:08 AM    Specimen: Blood   Result Value Ref Range     Hemoglobin A1C 10.20 (H) 4.80 - 5.60 %   Procalcitonin    Collection Time: 06/17/25  4:08 AM    Specimen: Blood   Result Value Ref Range    Procalcitonin 0.98 (H) 0.00 - 0.25 ng/mL   Basic Metabolic Panel    Collection Time: 06/17/25  4:09 AM    Specimen: Blood   Result Value Ref Range    Glucose 184 (H) 65 - 99 mg/dL    BUN 38.0 (H) 6.0 - 20.0 mg/dL    Creatinine 1.86 (H) 0.76 - 1.27 mg/dL    Sodium 128 (L) 136 - 145 mmol/L    Potassium 4.9 3.5 - 5.2 mmol/L    Chloride 97 (L) 98 - 107 mmol/L    CO2 15.8 (L) 22.0 - 29.0 mmol/L    Calcium 8.2 (L) 8.6 - 10.5 mg/dL    BUN/Creatinine Ratio 20.4 7.0 - 25.0    Anion Gap 15.2 (H) 5.0 - 15.0 mmol/L    eGFR 41.4 (L) >60.0 mL/min/1.73   Lipid Panel    Collection Time: 06/17/25  4:09 AM    Specimen: Blood   Result Value Ref Range    Total Cholesterol 82 0 - 200 mg/dL    Triglycerides 71 0 - 150 mg/dL    HDL Cholesterol 20 (L) 40 - 60 mg/dL    LDL Cholesterol  46 0 - 100 mg/dL    VLDL Cholesterol 16 5 - 40 mg/dL    LDL/HDL Ratio 2.39    CBC Auto Differential    Collection Time: 06/17/25  4:09 AM    Specimen: Blood   Result Value Ref Range    WBC 20.86 (H) 3.40 - 10.80 10*3/mm3    RBC 3.89 (L) 4.14 - 5.80 10*6/mm3    Hemoglobin 11.2 (L) 13.0 - 17.7 g/dL    Hematocrit 34.1 (L) 37.5 - 51.0 %    MCV 87.7 79.0 - 97.0 fL    MCH 28.8 26.6 - 33.0 pg    MCHC 32.8 31.5 - 35.7 g/dL    RDW 13.6 12.3 - 15.4 %    RDW-SD 43.7 37.0 - 54.0 fl    MPV 11.3 6.0 - 12.0 fL    Platelets 184 140 - 450 10*3/mm3    Neutrophil % 92.3 (H) 42.7 - 76.0 %    Lymphocyte % 3.0 (L) 19.6 - 45.3 %    Monocyte % 3.6 (L) 5.0 - 12.0 %    Eosinophil % 0.1 (L) 0.3 - 6.2 %    Basophil % 0.2 0.0 - 1.5 %    Immature Grans % 0.8 (H) 0.0 - 0.5 %    Neutrophils, Absolute 19.24 (H) 1.70 - 7.00 10*3/mm3    Lymphocytes, Absolute 0.62 (L) 0.70 - 3.10 10*3/mm3    Monocytes, Absolute 0.76 0.10 - 0.90 10*3/mm3    Eosinophils, Absolute 0.03 0.00 - 0.40 10*3/mm3    Basophils, Absolute 0.05 0.00 - 0.20 10*3/mm3    Immature Grans,  Absolute 0.16 (H) 0.00 - 0.05 10*3/mm3    nRBC 0.0 0.0 - 0.2 /100 WBC   Vancomycin, Random    Collection Time: 06/17/25  4:09 AM    Specimen: Blood   Result Value Ref Range    Vancomycin Random 24.60 5.00 - 40.00 mcg/mL   POC Glucose 4x Daily Before Meals & at Bedtime    Collection Time: 06/17/25  7:00 AM    Specimen: Blood   Result Value Ref Range    Glucose 174 (H) 70 - 130 mg/dL   Adult Transthoracic Echo Complete W/ Cont if Necessary Per Protocol    Collection Time: 06/17/25  9:58 AM   Result Value Ref Range    EF(MOD-bp) 32.7 %    LVIDd 5.0 cm    LVIDs 4.4 cm    IVSd 0.91 cm    LVPWd 0.72 cm    FS 10.9 %    IVS/LVPW 1.26 cm    ESV(cubed) 86.4 ml    LV Sys Vol (BSA corrected) 34.3 cm2    EDV(cubed) 122.0 ml    LV [place] Vol (BSA corrected) 46.6 cm2    LV mass(C)d 137.2 grams    LVOT area 3.0 cm2    LVOT diam 1.95 cm    EDV(MOD-sp2) 150.0 ml    EDV(MOD-sp4) 112.0 ml    ESV(MOD-sp2) 99.4 ml    ESV(MOD-sp4) 82.3 ml    SV(MOD-sp2) 50.6 ml    SV(MOD-sp4) 29.7 ml    SVi(MOD-SP2) 21.1 ml/m2    SVi(MOD-SP4) 12.4 ml/m2    SVi (LVOT) 14.2 ml/m2    EF(MOD-sp2) 33.7 %    EF(MOD-sp4) 26.5 %    MV E max daniel 97.9 cm/sec    MV A max daniel 68.0 cm/sec    MV dec time 0.11 sec    MV E/A 1.44     Pulm A Revs Dur 0.09 sec    LA ESV Index (BP) 19.0 ml/m2    Med Peak E' Daniel 5.0 cm/sec    Lat Peak E' Daniel 7.8 cm/sec    Avg E/e' ratio 15.30     SV(LVOT) 34.0 ml    RV Base 3.5 cm    RV Mid 4.2 cm    RV Length 8.5 cm    TAPSE (>1.6) 1.45 cm    RV S' 10.3 cm/sec    LA dimension (2D)  4.1 cm    Pulm Sys Daniel 36.3 cm/sec    Pulm [place] Daniel 40.9 cm/sec    Pulm S/D 0.89     Pulm A Revs Daniel 27.6 cm/sec    LV V1 max 61.3 cm/sec    LV V1 max PG 1.50 mmHg    LV V1 mean PG 1.00 mmHg    LV V1 VTI 11.4 cm    Ao pk daniel 89.1 cm/sec    Ao max PG 3.2 mmHg    Ao mean PG 2.00 mmHg    Ao V2 VTI 17.3 cm    ARUNA(I,D) 1.97 cm2    Dimensionless Index 0.66 (DI)    MV max PG 3.8 mmHg    MV mean PG 2.00 mmHg    MV V2 VTI 19.2 cm    MVA(VTI) 1.77 cm2    MV dec slope 899.0  cm/sec2    RV V1 max PG 1.45 mmHg    RV V1 max 60.2 cm/sec    RV V1 VTI 10.0 cm    PA V2 max 68.2 cm/sec    PA acc time 0.10 sec    Ao root diam 3.2 cm   POC Glucose 4x Daily Before Meals & at Bedtime    Collection Time: 06/17/25 11:03 AM    Specimen: Blood   Result Value Ref Range    Glucose 220 (H) 70 - 130 mg/dL   POC Glucose Once    Collection Time: 06/17/25  3:46 PM    Specimen: Blood   Result Value Ref Range    Glucose 159 (H) 70 - 130 mg/dL        If labs were ordered, I independently reviewed the results and considered them in treating the patient.      EMERGENCY DEPARTMENT COURSE and DIFFERENTIAL DIAGNOSIS/MDM:   Vitals:  AS OF 19:56 EDT    BP - 126/89  HR - 93  TEMP - 98 °F (36.7 °C) (Oral)  O2 SATS - 100%        Discussion below represents my analysis of pertinent findings related to patient's condition, differential diagnosis, treatment plan and final disposition.      Differential diagnosis:  The differential diagnosis associated with the patient's presentation includes: sepsis, pna, UTI, bacteremia, intraabdominal infection, stemi, nonstemi      Independent interpretations (ECG/rhythm strip/X-ray/US/CT scan): I independently interpreted the patient's chest x-ray and cardiac monitor.  No focal pulmonary infiltrate, patient in sinus tachycardia      Additional sources:  Discussed/obtained information from independent historians:   [] Spouse:   [] Parent:   [] Friend:   [x] EMS: Report was taken from EMS.  Concerned about anterior ST elevations during transport.   [] Other:  External (non-ED) record review:   [] Inpatient record:   [] Office record:   [] Outpatient record:   [] Prior Outpatient labs:   [] Prior Outpatient radiology:   [] Primary Care record:   [] Outside ED record:   [] Other:       Patient's care impacted by:   [x] Diabetes   [] Hypertension   [] Coronary Artery Disease   [] Cancer   [x] Other: Coronary disease status post PCI    Care significantly affected by Social Determinants of  Health (housing and economic circumstances, unemployment)    [] Yes     [x] No   If yes, Patient's care significantly limited by  Social Determinants of Health including:    [] Inadequate housing    [] Low income    [] Alcoholism and drug addiction in family    [] Problems related to primary support group    [] Unemployment    [] Problems related to employment    [] Other Social Determinants of Health:       ED Course:    ED Course as of 06/17/25 1956 Mon Jun 16, 2025 1942 I spoke with Dr. Wilson with cardiology.  He has personally reviewed the patient's ECG.  We discussed patient's clinical presentation with nausea only but no chest pain, high fever and tachycardia.  He feels that this is unlikely to be STEMI at this point given his clinical presentation.  Recommends treating conservatively, checking for source of infection.  Would not take the patient to the Cath Lab right now. [NS]   2049 I spoke with Dr. Wilson again.  Reviewed patient's initial troponin value and discussed the case again in detail.  Ultimately, he has decided to take the patient to the Cath Lab to see how his vessels look.  I have notified the charge desk. [NS]   2111 Sorry to give you these back-to-back, but this tsering is going to the Cath Lab. He had recent admission here for non-STEMI, was transferred to [facility] for consideration of CABG. Felt to be a high risk surgical candidate, underwent high risk PCI with stenting of the LAD and RCA. Over the past day, has developed some dry cough and intense nausea but no chest pain. On arrival to the ED, febrile to 103, tachycardic to 120, had significant elevation in the anterior leads but appear to be old with significant Q waves. I spoke with Dr. Wilson initially who reviewed the ECG, felt that it was more likely to be due to underlying infection. Patient labs revealed white count of 23, TYRELL with creatinine of 1.9 up from a baseline of normal, initial troponin was around 3000. I spoke with   [facility] again who agreed to take him to the Cath Lab. I did not see anything definite on his chest x-ray, he has no skin wounds, no abdominal pain or tenderness. I have ordered vancomycin and Zosyn for empiric coverage. His blood pressure has been stable while in the ED, he actually feels much better after receiving some fluids and Zofran. [NS]   2111 I discussed the case with hospitalist Dr. Rogel.  Discussed history, presentation, workup.  Accepts patient for admission. [NS]      ED Course User Index  [NS] Roddy Mcpherson MD       CRITICAL CARE TIME    Approximately 90 minutes of discontinuous critical care time was provided to this patient by myself absent of any time spent performing procedures.  Patient presents critically ill with acute sepsis complicated by acute coronary syndrome placing the cardiovascular, respiratory, neurologic, renal systems at risk requiring the following interventions: Aggressive IV fluid resuscitation, administration of broad-spectrum IV antibiotics, interpretation of labs/CCT/imaging, constant bedside attention, multiple conversations with specialists, coordination of Cath Lab transfer and admission to the hospital.  Patient at high risk of deterioration and possibly death without these interventions.      FINAL IMPRESSION      1. Acute sepsis    2. Acute coronary syndrome    3. Acute kidney injury    4. History of diabetes mellitus    5. History of hypertension          DISPOSITION/PLAN     ED Disposition       ED Disposition   Send to Cath Lab    Condition   --    Comment   --                 Comment: Please note this report has been produced using speech recognition software.      Roddy Mcpherson MD  Attending Emergency Physician             Roddy Mcpherson MD  06/17/25 1959

## 2025-06-16 NOTE — Clinical Note
Hemostasis started on the right femoral artery. Angio-Seal was used in achieving hemostasis. Closure device deployed in the vessel. Hemostasis achieved successfully. Closure device additional comment: RT [place] held manual pressure for 20 minutes.

## 2025-06-17 ENCOUNTER — APPOINTMENT (OUTPATIENT)
Dept: CARDIOLOGY | Facility: HOSPITAL | Age: 60
End: 2025-06-17
Payer: MEDICARE

## 2025-06-17 ENCOUNTER — APPOINTMENT (OUTPATIENT)
Dept: CT IMAGING | Facility: HOSPITAL | Age: 60
End: 2025-06-17
Payer: MEDICARE

## 2025-06-17 VITALS
BODY MASS INDEX: 36.13 KG/M2 | WEIGHT: 266.76 LBS | RESPIRATION RATE: 20 BRPM | SYSTOLIC BLOOD PRESSURE: 126 MMHG | OXYGEN SATURATION: 100 % | HEIGHT: 72 IN | DIASTOLIC BLOOD PRESSURE: 89 MMHG | TEMPERATURE: 98 F | HEART RATE: 93 BPM

## 2025-06-17 LAB
ANION GAP SERPL CALCULATED.3IONS-SCNC: 15.2 MMOL/L (ref 5–15)
AORTIC DIMENSIONLESS INDEX: 0.66 (DI)
AV MEAN PRESS GRAD SYS DOP V1V2: 2 MMHG
AV VMAX SYS DOP: 89.1 CM/SEC
BASOPHILS # BLD AUTO: 0.05 10*3/MM3 (ref 0–0.2)
BASOPHILS NFR BLD AUTO: 0.2 % (ref 0–1.5)
BH CV ECHO MEAS - AO MAX PG: 3.2 MMHG
BH CV ECHO MEAS - AO ROOT DIAM: 3.2 CM
BH CV ECHO MEAS - AO V2 VTI: 17.3 CM
BH CV ECHO MEAS - AVA(I,D): 1.97 CM2
BH CV ECHO MEAS - EDV(CUBED): 122 ML
BH CV ECHO MEAS - EDV(MOD-SP2): 150 ML
BH CV ECHO MEAS - EDV(MOD-SP4): 112 ML
BH CV ECHO MEAS - EF(MOD-SP2): 33.7 %
BH CV ECHO MEAS - EF(MOD-SP4): 26.5 %
BH CV ECHO MEAS - ESV(CUBED): 86.4 ML
BH CV ECHO MEAS - ESV(MOD-SP2): 99.4 ML
BH CV ECHO MEAS - ESV(MOD-SP4): 82.3 ML
BH CV ECHO MEAS - FS: 10.9 %
BH CV ECHO MEAS - IVS/LVPW: 1.26 CM
BH CV ECHO MEAS - IVSD: 0.91 CM
BH CV ECHO MEAS - LA DIMENSION: 4.1 CM
BH CV ECHO MEAS - LAT PEAK E' VEL: 7.8 CM/SEC
BH CV ECHO MEAS - LV DIASTOLIC VOL/BSA (35-75): 46.6 CM2
BH CV ECHO MEAS - LV MASS(C)D: 137.2 GRAMS
BH CV ECHO MEAS - LV MAX PG: 1.5 MMHG
BH CV ECHO MEAS - LV MEAN PG: 1 MMHG
BH CV ECHO MEAS - LV SYSTOLIC VOL/BSA (12-30): 34.3 CM2
BH CV ECHO MEAS - LV V1 MAX: 61.3 CM/SEC
BH CV ECHO MEAS - LV V1 VTI: 11.4 CM
BH CV ECHO MEAS - LVIDD: 5 CM
BH CV ECHO MEAS - LVIDS: 4.4 CM
BH CV ECHO MEAS - LVOT AREA: 3 CM2
BH CV ECHO MEAS - LVOT DIAM: 1.95 CM
BH CV ECHO MEAS - LVPWD: 0.72 CM
BH CV ECHO MEAS - MED PEAK E' VEL: 5 CM/SEC
BH CV ECHO MEAS - MV A MAX VEL: 68 CM/SEC
BH CV ECHO MEAS - MV DEC SLOPE: 899 CM/SEC2
BH CV ECHO MEAS - MV DEC TIME: 0.11 SEC
BH CV ECHO MEAS - MV E MAX VEL: 97.9 CM/SEC
BH CV ECHO MEAS - MV E/A: 1.44
BH CV ECHO MEAS - MV MAX PG: 3.8 MMHG
BH CV ECHO MEAS - MV MEAN PG: 2 MMHG
BH CV ECHO MEAS - MV V2 VTI: 19.2 CM
BH CV ECHO MEAS - MVA(VTI): 1.77 CM2
BH CV ECHO MEAS - PA ACC TIME: 0.1 SEC
BH CV ECHO MEAS - PA V2 MAX: 68.2 CM/SEC
BH CV ECHO MEAS - PULM A REVS DUR: 0.09 SEC
BH CV ECHO MEAS - PULM A REVS VEL: 27.6 CM/SEC
BH CV ECHO MEAS - PULM DIAS VEL: 40.9 CM/SEC
BH CV ECHO MEAS - PULM S/D: 0.89
BH CV ECHO MEAS - PULM SYS VEL: 36.3 CM/SEC
BH CV ECHO MEAS - RV MAX PG: 1.45 MMHG
BH CV ECHO MEAS - RV V1 MAX: 60.2 CM/SEC
BH CV ECHO MEAS - RV V1 VTI: 10 CM
BH CV ECHO MEAS - SV(LVOT): 34 ML
BH CV ECHO MEAS - SV(MOD-SP2): 50.6 ML
BH CV ECHO MEAS - SV(MOD-SP4): 29.7 ML
BH CV ECHO MEAS - SVI(LVOT): 14.2 ML/M2
BH CV ECHO MEAS - SVI(MOD-SP2): 21.1 ML/M2
BH CV ECHO MEAS - SVI(MOD-SP4): 12.4 ML/M2
BH CV ECHO MEAS - TAPSE (>1.6): 1.45 CM
BH CV ECHO MEASUREMENTS AVERAGE E/E' RATIO: 15.3
BH CV XLRA - RV BASE: 3.5 CM
BH CV XLRA - RV LENGTH: 8.5 CM
BH CV XLRA - RV MID: 4.2 CM
BH CV XLRA - TDI S': 10.3 CM/SEC
BUN SERPL-MCNC: 38 MG/DL (ref 6–20)
BUN/CREAT SERPL: 20.4 (ref 7–25)
CALCIUM SPEC-SCNC: 8.2 MG/DL (ref 8.6–10.5)
CHLORIDE SERPL-SCNC: 97 MMOL/L (ref 98–107)
CHOLEST SERPL-MCNC: 82 MG/DL (ref 0–200)
CO2 SERPL-SCNC: 15.8 MMOL/L (ref 22–29)
CREAT SERPL-MCNC: 1.86 MG/DL (ref 0.76–1.27)
DEPRECATED RDW RBC AUTO: 43.7 FL (ref 37–54)
EGFRCR SERPLBLD CKD-EPI 2021: 41.4 ML/MIN/1.73
EOSINOPHIL # BLD AUTO: 0.03 10*3/MM3 (ref 0–0.4)
EOSINOPHIL NFR BLD AUTO: 0.1 % (ref 0.3–6.2)
ERYTHROCYTE [DISTWIDTH] IN BLOOD BY AUTOMATED COUNT: 13.6 % (ref 12.3–15.4)
GLUCOSE BLDC GLUCOMTR-MCNC: 159 MG/DL (ref 70–130)
GLUCOSE BLDC GLUCOMTR-MCNC: 174 MG/DL (ref 70–130)
GLUCOSE BLDC GLUCOMTR-MCNC: 189 MG/DL (ref 70–130)
GLUCOSE BLDC GLUCOMTR-MCNC: 220 MG/DL (ref 70–130)
GLUCOSE SERPL-MCNC: 184 MG/DL (ref 65–99)
HBA1C MFR BLD: 10.2 % (ref 4.8–5.6)
HCT VFR BLD AUTO: 34.1 % (ref 37.5–51)
HDLC SERPL-MCNC: 20 MG/DL (ref 40–60)
HGB BLD-MCNC: 11.2 G/DL (ref 13–17.7)
IMM GRANULOCYTES # BLD AUTO: 0.16 10*3/MM3 (ref 0–0.05)
IMM GRANULOCYTES NFR BLD AUTO: 0.8 % (ref 0–0.5)
LDLC SERPL CALC-MCNC: 46 MG/DL (ref 0–100)
LDLC/HDLC SERPL: 2.39 {RATIO}
LEFT ATRIUM VOLUME INDEX: 19 ML/M2
LV EF BIPLANE MOD: 32.7 %
LYMPHOCYTES # BLD AUTO: 0.62 10*3/MM3 (ref 0.7–3.1)
LYMPHOCYTES NFR BLD AUTO: 3 % (ref 19.6–45.3)
MCH RBC QN AUTO: 28.8 PG (ref 26.6–33)
MCHC RBC AUTO-ENTMCNC: 32.8 G/DL (ref 31.5–35.7)
MCV RBC AUTO: 87.7 FL (ref 79–97)
MONOCYTES # BLD AUTO: 0.76 10*3/MM3 (ref 0.1–0.9)
MONOCYTES NFR BLD AUTO: 3.6 % (ref 5–12)
MRSA DNA SPEC QL NAA+PROBE: NORMAL
NEUTROPHILS NFR BLD AUTO: 19.24 10*3/MM3 (ref 1.7–7)
NEUTROPHILS NFR BLD AUTO: 92.3 % (ref 42.7–76)
NRBC BLD AUTO-RTO: 0 /100 WBC (ref 0–0.2)
PLATELET # BLD AUTO: 184 10*3/MM3 (ref 140–450)
PMV BLD AUTO: 11.3 FL (ref 6–12)
POTASSIUM SERPL-SCNC: 4.9 MMOL/L (ref 3.5–5.2)
PROCALCITONIN SERPL-MCNC: 0.98 NG/ML (ref 0–0.25)
RBC # BLD AUTO: 3.89 10*6/MM3 (ref 4.14–5.8)
SODIUM SERPL-SCNC: 128 MMOL/L (ref 136–145)
TRIGL SERPL-MCNC: 71 MG/DL (ref 0–150)
VANCOMYCIN SERPL-MCNC: 24.6 MCG/ML (ref 5–40)
VLDLC SERPL-MCNC: 16 MG/DL (ref 5–40)
WBC NRBC COR # BLD AUTO: 20.86 10*3/MM3 (ref 3.4–10.8)

## 2025-06-17 PROCEDURE — 82948 REAGENT STRIP/BLOOD GLUCOSE: CPT | Performed by: INTERNAL MEDICINE

## 2025-06-17 PROCEDURE — 83036 HEMOGLOBIN GLYCOSYLATED A1C: CPT | Performed by: INTERNAL MEDICINE

## 2025-06-17 PROCEDURE — 99231 SBSQ HOSP IP/OBS SF/LOW 25: CPT | Performed by: INTERNAL MEDICINE

## 2025-06-17 PROCEDURE — 63710000001 INSULIN LISPRO (HUMAN) PER 5 UNITS: Performed by: INTERNAL MEDICINE

## 2025-06-17 PROCEDURE — 87641 MR-STAPH DNA AMP PROBE: CPT | Performed by: INTERNAL MEDICINE

## 2025-06-17 PROCEDURE — 63710000001 INSULIN GLARGINE PER 5 UNITS: Performed by: INTERNAL MEDICINE

## 2025-06-17 PROCEDURE — 99239 HOSP IP/OBS DSCHRG MGMT >30: CPT | Performed by: INTERNAL MEDICINE

## 2025-06-17 PROCEDURE — 80202 ASSAY OF VANCOMYCIN: CPT | Performed by: INTERNAL MEDICINE

## 2025-06-17 PROCEDURE — 93306 TTE W/DOPPLER COMPLETE: CPT | Performed by: INTERNAL MEDICINE

## 2025-06-17 PROCEDURE — 80048 BASIC METABOLIC PNL TOTAL CA: CPT | Performed by: INTERNAL MEDICINE

## 2025-06-17 PROCEDURE — 82948 REAGENT STRIP/BLOOD GLUCOSE: CPT

## 2025-06-17 PROCEDURE — 25010000002 SULFUR HEXAFLUORIDE MICROSPH 60.7-25 MG RECONSTITUTED SUSPENSION: Performed by: INTERNAL MEDICINE

## 2025-06-17 PROCEDURE — 25810000003 SODIUM CHLORIDE 0.9 % SOLUTION: Performed by: INTERNAL MEDICINE

## 2025-06-17 PROCEDURE — 84145 PROCALCITONIN (PCT): CPT | Performed by: INTERNAL MEDICINE

## 2025-06-17 PROCEDURE — 25010000002 VANCOMYCIN 750 MG RECONSTITUTED SOLUTION 1 EACH VIAL: Performed by: INTERNAL MEDICINE

## 2025-06-17 PROCEDURE — 25810000003 SODIUM CHLORIDE 0.9 % SOLUTION 250 ML FLEX CONT: Performed by: INTERNAL MEDICINE

## 2025-06-17 PROCEDURE — 71250 CT THORAX DX C-: CPT

## 2025-06-17 PROCEDURE — 25010000002 ONDANSETRON PER 1 MG: Performed by: INTERNAL MEDICINE

## 2025-06-17 PROCEDURE — 80061 LIPID PANEL: CPT | Performed by: INTERNAL MEDICINE

## 2025-06-17 PROCEDURE — 25010000002 HEPARIN (PORCINE) PER 1000 UNITS: Performed by: INTERNAL MEDICINE

## 2025-06-17 PROCEDURE — 85025 COMPLETE CBC W/AUTO DIFF WBC: CPT | Performed by: INTERNAL MEDICINE

## 2025-06-17 PROCEDURE — 93306 TTE W/DOPPLER COMPLETE: CPT

## 2025-06-17 PROCEDURE — 25010000002 PIPERACILLIN SOD-TAZOBACTAM PER 1 G: Performed by: INTERNAL MEDICINE

## 2025-06-17 RX ORDER — ASPIRIN 81 MG/1
81 TABLET ORAL DAILY
Start: 2025-06-18

## 2025-06-17 RX ORDER — LIDOCAINE 4 G/G
1 PATCH TOPICAL
Status: DISCONTINUED | OUTPATIENT
Start: 2025-06-17 | End: 2025-06-17 | Stop reason: HOSPADM

## 2025-06-17 RX ORDER — TICAGRELOR 90 MG/1
90 TABLET, FILM COATED ORAL 2 TIMES DAILY
COMMUNITY

## 2025-06-17 RX ORDER — TICAGRELOR 90 MG/1
90 TABLET, FILM COATED ORAL 2 TIMES DAILY
Qty: 60 TABLET | Refills: 0 | Status: SHIPPED | OUTPATIENT
Start: 2025-06-17

## 2025-06-17 RX ORDER — VALSARTAN 40 MG/1
40 TABLET ORAL DAILY
COMMUNITY

## 2025-06-17 RX ORDER — METOPROLOL SUCCINATE 25 MG/1
25 TABLET, EXTENDED RELEASE ORAL DAILY
COMMUNITY

## 2025-06-17 RX ORDER — NIACIN 1000 MG
1000 TABLET, EXTENDED RELEASE ORAL DAILY
COMMUNITY

## 2025-06-17 RX ORDER — LIDOCAINE 4 G/G
1 PATCH TOPICAL
Qty: 30 PATCH | Refills: 0 | Status: SHIPPED | OUTPATIENT
Start: 2025-06-17 | End: 2025-07-17

## 2025-06-17 RX ADMIN — ALPRAZOLAM 0.25 MG: 0.25 TABLET ORAL at 00:42

## 2025-06-17 RX ADMIN — Medication 10 ML: at 00:32

## 2025-06-17 RX ADMIN — INSULIN LISPRO 1 UNITS: 100 INJECTION, SOLUTION INTRAVENOUS; SUBCUTANEOUS at 00:32

## 2025-06-17 RX ADMIN — Medication 10 ML: at 08:54

## 2025-06-17 RX ADMIN — LIDOCAINE 1 PATCH: 4 PATCH TOPICAL at 12:23

## 2025-06-17 RX ADMIN — SULFUR HEXAFLUORIDE 4 ML: KIT at 09:59

## 2025-06-17 RX ADMIN — PIPERACILLIN AND TAZOBACTAM 4.5 G: 4; .5 INJECTION, POWDER, FOR SOLUTION INTRAVENOUS at 04:12

## 2025-06-17 RX ADMIN — INSULIN GLARGINE 30 UNITS: 100 INJECTION, SOLUTION SUBCUTANEOUS at 00:32

## 2025-06-17 RX ADMIN — ONDANSETRON 4 MG: 2 INJECTION INTRAMUSCULAR; INTRAVENOUS at 00:42

## 2025-06-17 RX ADMIN — HEPARIN SODIUM 5000 UNITS: 5000 INJECTION INTRAVENOUS; SUBCUTANEOUS at 08:53

## 2025-06-17 RX ADMIN — PIPERACILLIN AND TAZOBACTAM 4.5 G: 4; .5 INJECTION, POWDER, FOR SOLUTION INTRAVENOUS at 12:24

## 2025-06-17 RX ADMIN — ASPIRIN 81 MG: 81 TABLET, COATED ORAL at 08:53

## 2025-06-17 RX ADMIN — VANCOMYCIN HYDROCHLORIDE 750 MG: 750 INJECTION, POWDER, LYOPHILIZED, FOR SOLUTION INTRAVENOUS at 12:24

## 2025-06-17 RX ADMIN — TICAGRELOR 90 MG: 90 TABLET ORAL at 08:56

## 2025-06-17 RX ADMIN — ATORVASTATIN CALCIUM 80 MG: 80 TABLET, FILM COATED ORAL at 08:53

## 2025-06-17 RX ADMIN — SENNOSIDES, DOCUSATE SODIUM 2 TABLET: 50; 8.6 TABLET, FILM COATED ORAL at 00:31

## 2025-06-17 RX ADMIN — INSULIN LISPRO 5 UNITS: 100 INJECTION, SOLUTION INTRAVENOUS; SUBCUTANEOUS at 12:24

## 2025-06-17 RX ADMIN — HYDROCODONE BITARTRATE AND ACETAMINOPHEN 1 TABLET: 5; 325 TABLET ORAL at 08:59

## 2025-06-17 RX ADMIN — MUPIROCIN 1 APPLICATION: 20 OINTMENT TOPICAL at 00:32

## 2025-06-17 RX ADMIN — SODIUM CHLORIDE 10 ML/HR: 9 INJECTION, SOLUTION INTRAVENOUS at 00:33

## 2025-06-17 RX ADMIN — HYDROCODONE BITARTRATE AND ACETAMINOPHEN 1 TABLET: 5; 325 TABLET ORAL at 00:31

## 2025-06-17 RX ADMIN — TICAGRELOR 90 MG: 90 TABLET ORAL at 00:31

## 2025-06-17 RX ADMIN — MUPIROCIN 1 APPLICATION: 20 OINTMENT TOPICAL at 08:56

## 2025-06-17 RX ADMIN — INSULIN LISPRO 6 UNITS: 100 INJECTION, SOLUTION INTRAVENOUS; SUBCUTANEOUS at 08:53

## 2025-06-17 NOTE — PROGRESS NOTES
"Patient Name: Deep Mendez  YOB: 1965  MRN: [id number]  Admission date: 6/16/2025  Reason for Encounter: MST 2-3 or Nursing Admission Screen    [facility] Clinical Nutrition Assessment     Subjective    Subjective Information     6/17: Pt admitted d/t concerns for NSTEMI and is s/p heart cath. No significant wt changes per EMR. Will f/up once PO intake is established and add ONS if warranted.      Assessment    H&P and Current Problems      H&P  Past Medical History:   Diagnosis Date    Acute appendicitis with perforation, localized peritonitis, abscess, and gangrene 03/13/2024    Diabetes mellitus     Hypertension     Impaired mobility     Rupture of appendix 03/14/2024    Type 2 diabetes mellitus       Past Surgical History:   Procedure Laterality Date    APPENDECTOMY N/A 3/13/2024    Procedure: APPENDECTOMY LAPAROSCOPIC;  Surgeon: Yazmin Zayas MD;  Location: [facility] OR;  Service: General;  Laterality: N/A;    BELOW KNEE LEG AMPUTATION      CARDIAC CATHETERIZATION N/A 5/23/2025    Procedure: Coronary angiography;  Surgeon: Cornleio Wilson MD;  Location: [facility] CATH INVASIVE LOCATION;  Service: Cardiology;  Laterality: N/A;    CARDIAC CATHETERIZATION N/A 6/16/2025    Procedure: Left Heart Cath;  Surgeon: Cornelio Wilson MD;  Location: [facility] CATH INVASIVE LOCATION;  Service: Cardiology;  Laterality: N/A;    EYE SURGERY        Current Problems   Admission Diagnosis:  Old MI (myocardial infarction) [I25.2]    Problem List:    Old MI (myocardial infarction)      Other Applicable Nutrition Information:        Anthropometrics      BMI, Height, Weight Estimated body mass index is 36.06 kg/m² as calculated from the following:    Height as of this encounter: 182.9 cm (72\").    Weight as of this encounter: 121 kg (265 lb 14 oz).    Weight Method: Bed scale       Trending Weight Changes weight loss of 7 lbs (2.5%) over 9 month(s)    Significant?  No    Wt from 5/23/25 are both stated   Weight " History  Wt Readings from Last 20 Encounters:   06/16/25 2300 121 kg (265 lb 14 oz)   05/23/25 0859 127 kg (279 lb 15.8 oz)   05/23/25 0007 127 kg (281 lb)   09/06/24 2033 123 kg (272 lb)   08/20/24 1343 124 kg (272 lb 9.6 oz)   04/03/24 1324 125 kg (275 lb)   03/15/24 0403 122 kg (269 lb 10 oz)   03/13/24 1758 119 kg (261 lb 7.5 oz)   03/12/24 2030 124 kg (273 lb)        Labs      Comment: High A1C     Results from last 7 days   Lab Units 06/17/25  0409 06/16/25  1950   SODIUM mmol/L 128* 126*   POTASSIUM mmol/L 4.9 4.9   GLUCOSE mg/dL 184* 127*   BUN mg/dL 38.0* 34.0*   CREATININE mg/dL 1.86* 1.91*   CALCIUM mg/dL 8.2* 8.5*   ALBUMIN g/dL  --  3.3*   LACTATE mmol/L  --  2.0   BILIRUBIN mg/dL  --  1.0   ALK PHOS U/L  --  79   AST (SGOT) U/L  --  37   ALT (SGPT) U/L  --  29   TRIGLYCERIDES mg/dL 71  --    PROBNP pg/mL  --  13,479.0*     Results from last 7 days   Lab Units 06/17/25  0409 06/16/25  1950   PLATELETS 10*3/mm3 184 212   HEMOGLOBIN g/dL 11.2* 11.9*   HEMATOCRIT % 34.1* 34.9*     Lab Results   Component Value Date    HGBA1C 10.20 (H) 06/17/2025          Medications       Scheduled Medications aspirin, 81 mg, Oral, Daily  atorvastatin, 80 mg, Oral, Daily  heparin (porcine), 5,000 Units, Subcutaneous, Q12H  insulin glargine, 1-200 Units, Subcutaneous, Nightly - Glucommander  insulin lispro, 1-200 Units, Subcutaneous, 4x Daily With Meals & Nightly  mupirocin, 1 Application, Each Nare, BID  piperacillin-tazobactam, 4.5 g, Intravenous, Q8H  senna-docusate sodium, 2 tablet, Oral, BID  sodium chloride, 10 mL, Intravenous, Q12H  ticagrelor, 90 mg, Oral, BID  vancomycin (dosing per levels), , Not Applicable, Daily  vancomycin, 750 mg, Intravenous, Once        Infusions Pharmacy to dose vancomycin,   Pharmacy To Dose:,   sodium chloride, 10 mL/hr, Last Rate: 10 mL/hr (06/17/25 0033)        PRN Medications   acetaminophen    ALPRAZolam    senna-docusate sodium **AND** polyethylene glycol **AND** bisacodyl **AND**  bisacodyl    dextrose    dextrose    diphenhydrAMINE    glucagon (human recombinant)    HYDROcodone-acetaminophen    insulin lispro    Morphine **AND** naloxone    nitroglycerin    ondansetron ODT **OR** ondansetron    ondansetron ODT **OR** ondansetron    Pharmacy to dose vancomycin    Pharmacy To Dose:    sodium chloride    sodium chloride    temazepam     Physical Findings      Chewing/Swallowing  Teeth Status: Mouth/Teeth WDL: .WDL except, teeth Teeth Symptoms: tooth/teeth missing  Chewing/Swallowing Issues: No issues identified at this time   Edema  Generalized Edema: 2+ (Mild)           Leg, Right Edema: 3+ (Moderate)    Ankle, Right Edema: 3+ (Moderate)    Foot, Right Edema: 3+ (Moderate)   Bowel Function         I/Os  Intake & Output (last 3 days)         06/14 0701  06/15 0700 06/15 0701 06/16 0700 06/16 0701 06/17 0700 06/17 0701  06/18 0700    I.V. (mL/kg)   33 (0.3)     Total Intake(mL/kg)   33 (0.3)     Urine (mL/kg/hr)   1800     Total Output   1800     Net   -1767                    Lines, Drains, Airways, & Wounds       Active LDAs       Name Placement date Placement time Site Days Last dressing change    Peripheral IV 06/16/25 1948 20 G Anterior;Right Forearm 06/16/25  1948  Forearm  less than 1 06/16/25 1948 (12.71 hrs)    Peripheral IV 06/16/25 2004 20 G Anterior;Left Forearm 06/16/25 2004  Forearm  less than 1     Urethral Catheter Straight-tip 16 Fr. 06/17/25  0610  -- less than 1     Wound 05/23/25 0348 Right distal leg Diabetic Ulcer 05/23/25  0348  -- 25     Wound 05/23/25 0700 Right anterior foot 05/23/25  0700  -- 25     Wound 05/23/25 0758 Left leg 05/23/25  0758  -- 25                       Nutrition Focused Physical Exam     Trending NFPE      Malnutrition Severity Assessment            (1)   Current Nutrition Orders & Evaluation of Intake      Oral Nutrition     Food Allergies  and Intolerances NKFA   Current PO Diet Diet: Cardiac, Diabetic, Renal; Healthy Heart (2-3 Na+); Consistent  Carbohydrate; Low Potassium; Fluid Consistency: Thin (IDDSI 0)   Oral Nutrition Supplement None     Trending % PO Intake 6/17: None recorded at this time   (2)  Assessment & Plan   Nutrition Diagnosis and Goals       Nutrition Diagnosis 1 Altered Nutrition Related Lab Values  r/t DM AEB A1C=10.2.      Nutrition Diagnosis 2         Goal(s) Establish PO Intake, Nutrition Related Labs Improve , and No Significant Weight Loss     Nutrition Intervention and Prescription       Intervention  Continue to monitor for plan of care and Continue with current interventions      Diet Prescription CCD, HH, Renal, low K+    Supplement Prescription     Education Provided     (3)  Monitoring/Evaluation       Monitor/Evaluation Per Protocol, I&O, PO Intake, Pertinent Labs, Weight, Skin Status, GI Status, Symptoms, and POC/GOC     RD Follow-Up Encounter 1-2 days     Electronically signed by:  Moon Thomas RD  06/17/25 08:31 EDT

## 2025-06-17 NOTE — H&P
[facility]      Name:  Deep Mendez   Age:  59 y.o.  Sex:  male  :  1965  MRN:  [id number]   Visit Number:  [id number]  Admission Date:  2025  Date Of Service:  25  Primary Care Physician:  Sami Fisher MD    Chief Complaint:     Multiple complaint    History Of Presenting Illness:      Patient is a chronically ill 59-year-old male with history significant for multivessel CAD, type 2 diabetes, hypertension/hyperlipidemia who presents to the emergency room from home with multiple complaints.  Of note, patient taken emergently from ER to heart cath due to concerns for NSTEMI with troponins greater than 3000 x 2.  Patient seen post heart cath.  Denies chest pain, shortness of breath, cough.  Does admit to some generalized weakness and nausea without vomiting.  Patient's girlfriend also very poor historian.  History unreliable.  Patient was noted to be positive for sepsis.  Suspect that likely source bilateral pneumonia.  Will continue broad-spectrum empiric antibiotics.    Review Of Systems:    All systems were reviewed and negative except as mentioned in history of presenting illness, assessment and plan.    Past Medical History: Patient  has a past medical history of Acute appendicitis with perforation, localized peritonitis, abscess, and gangrene (2024), Diabetes mellitus, Hypertension, Impaired mobility, Rupture of appendix (2024), and Type 2 diabetes mellitus.    Past Surgical History: Patient  has a past surgical history that includes Below knee leg amputation; Eye surgery; Appendectomy (N/A, 3/13/2024); and Cardiac catheterization (N/A, 2025).    Social History: Patient  reports that he has quit smoking. His smoking use included cigarettes. He has never used smokeless tobacco. He reports that he does not drink alcohol and does not use drugs.    Family History:  Patient's family history has been reviewed and found to be  noncontributory.     Allergies:      Duloxetine hcl    Home Medications:    Prior to Admission Medications       Prescriptions Last Dose Informant Patient Reported? Taking?    aspirin 325 MG EC tablet   No No    Take 1 tablet by mouth Daily.    atorvastatin (LIPITOR) 80 MG tablet   No No    Take 1 tablet by mouth Daily.    dapagliflozin Propanediol (Farxiga) 10 MG tablet   Yes No    Take 10 mg by mouth Daily.    furosemide (LASIX) 40 MG tablet   Yes No    Take 1 tablet by mouth At Night As Needed.    glucagon (GLUCAGEN) 1 MG injection   No No    Inject 1 mg under the skin into the appropriate area as directed 1 (One) Time As Needed (severe hypoglycemia) for up to 1 dose.    Insulin Lispro, 1 Unit Dial, (HumaLOG KwikPen) 100 UNIT/ML solution pen-injector   No No    Inject 45 Units under the skin into the appropriate area as directed 3 (Three) Times a Day With Meals. Add additional correction 2 unit for every 50 mg/dl over 150. Max daily dose 200 units.    lisinopril (PRINIVIL,ZESTRIL) 5 MG tablet   Yes No    Take 1 tablet by mouth Daily.    TIRZEPATIDE SC   Yes No    Inject  under the skin into the appropriate area as directed.    Toujeo Max SoloStar 300 UNIT/ML solution pen-injector injection   No No    Inject 114 Units under the skin into the appropriate area as directed Daily. Titrate 2 units every 3 days if fasting blood sugar is more than 140. Max daily dose 120 units.          ED Medications:    Medications   vancomycin 2500 mg/500 mL 0.9% NS IVPB ([facility]) (2,500 mg Intravenous New Bag 6/16/25 6342)   atorvastatin (LIPITOR) tablet 80 mg (has no administration in time range)   sodium chloride 0.9 % infusion (has no administration in time range)   acetaminophen (TYLENOL) tablet 650 mg (has no administration in time range)   HYDROcodone-acetaminophen (NORCO) 5-325 MG per tablet 1 tablet (has no administration in time range)   morphine injection 4 mg (has no administration in time range)     And   naloxone (NARCAN)  injection 0.4 mg (has no administration in time range)   ALPRAZolam (XANAX) tablet 0.25 mg (has no administration in time range)   temazepam (RESTORIL) capsule 15 mg (has no administration in time range)   ondansetron ODT (ZOFRAN-ODT) disintegrating tablet 4 mg (has no administration in time range)     Or   ondansetron (ZOFRAN) injection 4 mg (has no administration in time range)   diphenhydrAMINE (BENADRYL) injection 25 mg (has no administration in time range)   aspirin EC tablet 81 mg (has no administration in time range)   ticagrelor (BRILINTA) tablet 90 mg (has no administration in time range)   nitroglycerin (NITROSTAT) SL tablet 0.4 mg (has no administration in time range)   sodium chloride 0.9 % flush 10 mL (has no administration in time range)   sodium chloride 0.9 % flush 10 mL (has no administration in time range)   sodium chloride 0.9 % infusion 40 mL (has no administration in time range)   mupirocin (BACTROBAN) 2 % nasal ointment 1 Application (has no administration in time range)   sennosides-docusate (PERICOLACE) 8.6-50 MG per tablet 2 tablet (has no administration in time range)     And   polyethylene glycol (MIRALAX) packet 17 g (has no administration in time range)     And   bisacodyl (DULCOLAX) EC tablet 5 mg (has no administration in time range)     And   bisacodyl (DULCOLAX) suppository 10 mg (has no administration in time range)   heparin (porcine) 5000 UNIT/ML injection 5,000 Units (has no administration in time range)   ondansetron ODT (ZOFRAN-ODT) disintegrating tablet 4 mg (has no administration in time range)     Or   ondansetron (ZOFRAN) injection 4 mg (has no administration in time range)   Pharmacy to dose vancomycin (has no administration in time range)   Pharmacy To Dose: Piperacillin-tazobactam (Zosyn) (has no administration in time range)   sodium chloride 0.9 % bolus 1,000 mL (0 mL Intravenous Stopped 6/16/25 2120)   acetaminophen (TYLENOL) tablet 1,000 mg (1,000 mg Oral Given  "6/16/25 2001)   ondansetron (ZOFRAN) injection 8 mg (8 mg Intravenous Given 6/16/25 2002)   aspirin chewable tablet 324 mg (324 mg Oral Given 6/16/25 2002)   lactated ringers bolus 1,000 mL (1,000 mL Intravenous New Bag 6/16/25 2109)   piperacillin-tazobactam (ZOSYN) IVPB 4.5 g IVPB in 100 mL NS (VTB) (0 g Intravenous Stopped 6/16/25 2145)     Vital Signs:  Temp:  [98.2 °F (36.8 °C)-103.4 °F (39.7 °C)] 98.2 °F (36.8 °C)  Heart Rate:  [] 98  Resp:  [16-18] 18  BP: ()/(58-90) 109/66        06/16/25  2300   Weight: 121 kg (265 lb 14 oz)     Body mass index is 36.06 kg/m².    Physical Exam:     Most recent vital Signs: /66   Pulse 98   Temp 98.2 °F (36.8 °C) (Oral)   Resp 18   Ht 182.9 cm (72\")   Wt 121 kg (265 lb 14 oz)   SpO2 95%   BMI 36.06 kg/m²     Physical Exam  Vitals reviewed.   Constitutional:       General: He is not in acute distress.     Appearance: He is obese.   HENT:      Head: Normocephalic and atraumatic.      Right Ear: External ear normal.      Left Ear: External ear normal.      Mouth/Throat:      Mouth: Mucous membranes are moist.      Pharynx: Oropharynx is clear.   Eyes:      Extraocular Movements: Extraocular movements intact.      Conjunctiva/sclera: Conjunctivae normal.   Cardiovascular:      Rate and Rhythm: Normal rate and regular rhythm.      Pulses: Normal pulses.      Heart sounds: Normal heart sounds.   Pulmonary:      Effort: No respiratory distress.      Comments: Diminished breath sounds bilaterally  Abdominal:      General: Bowel sounds are normal.      Palpations: Abdomen is soft.   Musculoskeletal:      Left lower leg: No edema.      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   Neurological:      General: No focal deficit present.      Mental Status: He is alert. Mental status is at baseline.         Laboratory data:    I have reviewed the labs done in the emergency room.    Results from last 7 days   Lab " Units 06/16/25 1950   SODIUM mmol/L 126*   POTASSIUM mmol/L 4.9   CHLORIDE mmol/L 92*   CO2 mmol/L 17.1*   BUN mg/dL 34.0*   CREATININE mg/dL 1.91*   CALCIUM mg/dL 8.5*   BILIRUBIN mg/dL 1.0   ALK PHOS U/L 79   ALT (SGPT) U/L 29   AST (SGOT) U/L 37   GLUCOSE mg/dL 127*     Results from last 7 days   Lab Units 06/16/25 1950   WBC 10*3/mm3 23.48*   HEMOGLOBIN g/dL 11.9*   HEMATOCRIT % 34.9*   PLATELETS 10*3/mm3 212         Results from last 7 days   Lab Units 06/16/25 2121 06/16/25 1950   HSTROP T ng/L 3,086* 3,290*     Results from last 7 days   Lab Units 06/16/25 1950   PROBNP pg/mL 13,479.0*         Results from last 7 days   Lab Units 06/16/25 1950   LIPASE U/L 7*         Results from last 7 days   Lab Units 06/16/25  1939   COLOR UA  Yellow   GLUCOSE UA  Negative   KETONES UA  Trace*   BLOOD UA  Trace*   LEUKOCYTES UA  Negative   PH, URINE  5.5   BILIRUBIN UA  Negative   UROBILINOGEN UA  1.0 E.U./dL   RBC UA /HPF 0-2   WBC UA /HPF None Seen       Pain Management Panel          Latest Ref Rng & Units 6/16/2025   Pain Management Panel   Amphetamine, Urine Qual Negative Negative    Barbiturates Screen, Urine Negative Negative    Benzodiazepine Screen, Urine Negative Negative    Buprenorphine, Screen, Urine Negative Negative    Cocaine Screen, Urine Negative Negative    Fentanyl, Urine Negative Negative    Methadone Screen , Urine Negative Negative    Methamphetamine, Ur Negative Negative        EKG:          Radiology:    Cardiac Catheterization/Vascular Study  Result Date: 6/16/2025  Chronic LAD occlusion Severe diffuse circumflex disease Patency of mid RCA stent       Assessment:    Sepsis  Bilateral pneumonia  NSTEMI   TYRELL  Type 2 diabetes  CHF  Hypertension  Hyperlipidemia  Obesity  Impaired mobility and ADLs      Plan:    Sepsis  Bilateral pneumonia  SIRS criteria: Fever, tachycardia, leukocytosis  Empiric broad-spectrum antibiotics vancomycin and Zosyn  CT chest pending  DuoNebs as needed  MRSA  screen  Follow culture results  NSTEMI   Troponin greater than 3000 x 2  Dr. Wilson consulted and patient taken from the ER to heart cath  Chronic LAD occlusion, severe diffuse circumflex disease.  Patency of mid RCA stent  Recommend GDMT, DAPT  Echo pending  TYRELL  Likely multifactorial in the setting of sepsis and NSTEMI  Nephrology consultation  Type 2 diabetes  Glucomander protocol  A1c pending  Diabetic education  Obesity  Impaired mobility and ADLs      Further orders as clinical course dictates.  Discussed case with patient's long-term girlfriend.  No next of kin.  Confirmed CODE STATUS.  Discussed overall poor prognosis.      Risk Assessment: High  DVT Prophylaxis: Heparin sq  Code Status: Full  Diet: Cardiac/Diabetic/Renal          Teddy Rogel DO  06/16/25  23:47 EDT    Dictated utilizing Dragon dictation.

## 2025-06-17 NOTE — CONSULTS
"Diabetes Education  Assessment/Teaching    Patient Name:  Deep Mendez  YOB: 1965  MRN: [id number]  Admit Date:  6/16/2025      Assessment Date:  6/17/2025  Flowsheet Row Most Recent Value   General Information     Height 182.9 cm (72.01\")   Height Method Stated   Weight 121 kg (266 lb 12.1 oz)   Weight Method Bed scale   Pregnancy Assessment    Diabetes History    Education Preferences    Nutrition Information    Assessment Topics    DM Goals                   Other Comments:  DM education referral related to A1c >7%.  Pt. Was seen by writer on 05/23/25 and provided full education as pt's A1c was 11.7%.  Pt's A1c today is 10.2%.  Although pt. Is not close to goal of A1c at 7% or less.  He has had a reduction of 1.5% in less than a month.  I asked pt. What changes he had made in successfully reducing his A1c.  Pt. States that he has made changes in his diet and has cut all the \"junk\" out of his diet.  Pt. States that his Dexcom G7 and pt. Did show me is typically now running more in the 100's and not as much in the 200's or 300's.  Pt. Is still taking his Farxiga 10 mg daily.  Pt. Is taking Lispro with meals 45 units and Pt. Is taking his Toujeo Max (U-300) 114 units once daily.  Pt. States that he appreciates the praise on his efforts to lower his BG.  Pt. Denies any refill needs on insulin, other meds or DM testing supplies.        Electronically signed by:  Danica Mustafa RN  06/17/25 11:51 EDT  "

## 2025-06-17 NOTE — PROGRESS NOTES
Pharmacy Consult-Vancomycin Dosing  Deep Mendez is a  59 y.o. male receiving vancomycin therapy.     Indication: Pneumonia  Consulting Provider: Dr. Rogel    Goal Trough: 15 to 20 mcg/mL    Current Antimicrobial Therapy  Anti-Infectives (From admission, onward)      Ordered     Dose/Rate Route Frequency Start Stop    06/17/25 0700  vancomycin 750 mg in sodium chloride 0.9 % 250 mL IVPB-VTB        Ordering Provider: Teddy Rogel DO    750 mg  333.3 mL/hr over 45 Minutes Intravenous Once 06/17/25 1300      06/17/25 0057  vancomycin (VANCOCIN) 1,500 mg in sodium chloride 0.9 % 500 mL IVPB  Status:  Discontinued        Ordering Provider: Teddy Rogel DO    1,500 mg  333.3 mL/hr over 90 Minutes Intravenous Every 24 Hours 06/17/25 1000 06/17/25 0700    06/17/25 0700  vancomycin (dosing per levels)        Ordering Provider: Teddy Rogel DO     Not Applicable Daily 06/17/25 0900 06/24/25 0859    06/17/25 0039  piperacillin-tazobactam (ZOSYN) IVPB 4.5 g IVPB in 100 mL NS (VTB)        Ordering Provider: Teddy Rogel DO    4.5 g  over 4 Hours Intravenous Every 8 Hours 06/17/25 0400 06/22/25 0359    06/16/25 2347  Pharmacy To Dose: Piperacillin-tazobactam (Zosyn)        Ordering Provider: Teddy Rogel DO     Not Applicable Continuous PRN 06/16/25 2346 06/21/25 2345    06/16/25 2347  Pharmacy to dose vancomycin        Ordering Provider: Teddy Rogel DO     Not Applicable Continuous PRN 06/16/25 2345 06/23/25 2344    06/16/25 2048  vancomycin 2500 mg/500 mL 0.9% NS IVPB ([facility])        Ordering Provider: Roddy Mcpherson MD    20 mg/kg × 127 kg  over 150 Minutes Intravenous Once 06/16/25 2104 06/17/25 0020    06/16/25 2048  piperacillin-tazobactam (ZOSYN) IVPB 4.5 g IVPB in 100 mL NS (VTB)        Ordering Provider: Roddy Mcpherson MD    4.5 g  over 30 Minutes Intravenous Once 06/16/25 2104 06/16/25 2142            Allergies  Allergies as of 06/16/2025 - Reviewed 06/16/2025   Allergen Reaction Noted  "   Duloxetine hcl Other (See Comments) 04/02/2015       Labs    Results from last 7 days   Lab Units 06/17/25  0409 06/16/25  1950   BUN mg/dL 38.0* 34.0*   CREATININE mg/dL 1.86* 1.91*       Results from last 7 days   Lab Units 06/17/25  0409 06/16/25  1950   WBC 10*3/mm3 20.86* 23.48*       Evaluation of Dosing     Last Dose Received in the ED/Outside Facility: Yes  Is Patient on Dialysis or Renal Replacement: Not at this time    Ht - 182.9 cm (72\")  Wt - 121 kg (265 lb 14 oz)    Estimated Creatinine Clearance: 58.2 mL/min (A) (by C-G formula based on SCr of 1.86 mg/dL (H)).    Intake & Output (last 3 days)         06/14 0701  06/15 0700 06/15 0701  06/16 0700 06/16 0701  06/17 0700 06/17 0701  06/18 0700    I.V. (mL/kg)   33 (0.3)     Total Intake(mL/kg)   33 (0.3)     Urine (mL/kg/hr)   1800     Total Output   1800     Net   -1767                     Microbiology and Radiology  Microbiology Results (last 10 days)       Procedure Component Value - Date/Time    COVID PRE-OP / PRE-PROCEDURE SCREENING ORDER (NO ISOLATION) - Swab, Nasopharynx [[id number]]  (Normal) Collected: 06/16/25 1946    Lab Status: Final result Specimen: Swab from Nasopharynx Updated: 06/16/25 2013    Narrative:      The following orders were created for panel order COVID PRE-OP / PRE-PROCEDURE SCREENING ORDER (NO ISOLATION) - Swab, Nasopharynx.  Procedure                               Abnormality         Status                     ---------                               -----------         ------                     COVID-19 and FLU A/B PCR...[[id number]]  Normal              Final result                 Please view results for these tests on the individual orders.    COVID-19 and FLU A/B PCR, 1 HR TAT - Swab, Nasopharynx [[id number]]  (Normal) Collected: 06/16/25 1946    Lab Status: Final result Specimen: Swab from Nasopharynx Updated: 06/16/25 2013     COVID19 Not Detected     Influenza A PCR Not Detected     Influenza B PCR Not Detected    " Narrative:      Fact sheet for providers: https://www.fda.gov/media/492335/download    Fact sheet for patients: https://www.fda.gov/media/189235/download    Test performed by PCR.            Vancomycin Levels:    Results from last 7 days   Lab Units 06/17/25  0409   VANCOMYCIN RM mcg/mL 24.60                   Assessment/Plan    Pharmacy to dose vancomycin for pneumonia. Goal trough 15 to 20 mcg/mL.  Patient currently receiving vancomycin dosed per level due to TYRELL. Vancomycin random level this morning of 24.6 mcg/mL represents very poor vancomycin clearance. Will order vancomycin 750 mg for one dose.  Assess clearance by vancomycin random level on 6/18 @ 0600  Pharmacy will continue to monitor renal function, cultures and sensitivities, and clinical status to adjust regimen as necessary.    Thank you for the consult,    Jared So, RosyD, BCPS   06/17/25 07:01 EDT

## 2025-06-17 NOTE — PROGRESS NOTES
[facility]    Name:  Deep Mendez   Age:  59 y.o.  Sex:  male  :  1965  MRN:  [id number]   Visit Number:  [id number]  Admission Date:  2025  Date Of Service:  25  Primary Care Physician:  Sami Fisher MD     LOS: 1 day :    Chief Complaint:      weakness    Subjective:    2025: Admitting provider documentation reviewed. Patient requests transfer for second opinion.    History Of Presenting Illness:       Patient is a chronically ill 59-year-old male with history significant for multivessel CAD, type 2 diabetes, hypertension/hyperlipidemia who presents to the emergency room from home with multiple complaints.  Of note, patient taken emergently from ER to heart cath due to concerns for NSTEMI with troponins greater than 3000 x 2.  Patient seen post heart cath.  Denies chest pain, shortness of breath, cough.  Does admit to some generalized weakness and nausea without vomiting.  Patient's girlfriend also very poor historian.  History unreliable.  Patient was noted to be positive for sepsis.  Suspect that likely source bilateral pneumonia.  Will continue broad-spectrum empiric antibiotics.  Edited by: Waqar Swan DO at 2025 1184     Review of Systems:     All systems were reviewed and negative except as mentioned in subjective, assessment and plan.    Vital Signs:    Temp:  [98 °F (36.7 °C)-103.4 °F (39.7 °C)] 98.7 °F (37.1 °C)  Heart Rate:  [] 85  Resp:  [16-20] 20  BP: ()/() 123/89    Intake and output:    I/O last 3 completed shifts:  In: 33 [I.V.:33]  Out: 1800 [Urine:1800]  I/O this shift:  In: 220 [P.O.:220]  Out: -     Physical Examination:    Constitutional: No acute distress, awake, alert  HENT: NCAT, mucous membranes moist  Respiratory: subtle rales bilaterally, respiratory effort normal   Cardiovascular: RRR, no murmurs, rubs, or gallops  Gastrointestinal: Positive bowel sounds, soft, nontender,  "nondistended  Musculoskeletal: Status post left BKA 3+ ankle edema on right  Psychiatric: Appropriate affect, cooperative  Neurologic: Oriented x 3, speech clear  Skin: RLE cellulitis and vascular ulcers   Edited by: Waqar Swan DO at 6/17/2025 1149     Laboratory results:    Results from last 7 days   Lab Units 06/17/25  0409 06/16/25 1950   SODIUM mmol/L 128* 126*   POTASSIUM mmol/L 4.9 4.9   CHLORIDE mmol/L 97* 92*   CO2 mmol/L 15.8* 17.1*   BUN mg/dL 38.0* 34.0*   CREATININE mg/dL 1.86* 1.91*   CALCIUM mg/dL 8.2* 8.5*   BILIRUBIN mg/dL  --  1.0   ALK PHOS U/L  --  79   ALT (SGPT) U/L  --  29   AST (SGOT) U/L  --  37   GLUCOSE mg/dL 184* 127*     Results from last 7 days   Lab Units 06/17/25  0409 06/16/25 1950   WBC 10*3/mm3 20.86* 23.48*   HEMOGLOBIN g/dL 11.2* 11.9*   HEMATOCRIT % 34.1* 34.9*   PLATELETS 10*3/mm3 184 212         Results from last 7 days   Lab Units 06/16/25  2121 06/16/25 1950   HSTROP T ng/L 3,086* 3,290*         No results for input(s): \"PHART\", \"OBP9FOU\", \"PO2ART\", \"GZJ1HOS\", \"BASEEXCESS\" in the last 8760 hours.   I have reviewed the patient's laboratory results.    Radiology results:    XR Chest 1 View  Result Date: 6/17/2025  PROCEDURE: XR CHEST 1 VW-  HISTORY: cough  COMPARISON: 5/23/2025.  FINDINGS: The heart is normal in size. The mediastinum is unremarkable. The lungs are hypoinflated. There is pulmonary vascular congestion. There is mild left basilar atelectasis. There is no pneumothorax. There are old right rib fractures.      Impression: Poor inspiration with atelectasis.       Images were reviewed, interpreted, and dictated by Dr. Aramis Dawson MD Transcribed by Jovany Dwyer PA-C.  This report was signed and finalized on 6/17/2025 8:22 AM by Aramis Dawson MD.      CT Chest Without Contrast Diagnostic  Result Date: 6/17/2025  FINAL REPORT TECHNIQUE: null CLINICAL HISTORY: edema vs pna COMPARISON: null FINDINGS: CT chest without contrast: Comparison: None FINDINGS: " Lungs/pleura: Low lung volumes. Dependent subsegmental atelectasis bilaterally worse on the left. Peripheral left lower lobe and right lower lobe infiltrates can not be excluded. No sizable pleural effusion. No pneumothorax. Mediastinum/heart/aorta: Heart size is normal. Coronary artery mural calcifications are present. Mild mediastinal lymphadenopathy. Calcified hilar lymph nodes. Thyroid without enlargement or mass. Unremarkable thoracic esophagus. Uppermost abdomen: No adrenal mass. Bilateral perinephric stranding and mild nephrolithiasis, without hydronephrosis. MSK: Moderate spondylosis of the thoracic spine. No acute fracture involving the visualized bony thorax. No destructive osseous lesion. Old right clavicle fracture.     Impression: IMPRESSION: 1. Low lung volumes. 2. Dependent atelectasis or peripheral infiltrates/pneumonia involving the lower lobes. 3. Mild mediastinal lymphadenopathy may be reactive. 4. Sequela of prior granulomatous infection. 5. Other findings above. Authenticated and Electronically Signed by Jorge Yuen MD on 06/17/2025 05:07:42 AM    Cardiac Catheterization/Vascular Study  Result Date: 6/16/2025  Chronic LAD occlusion Severe diffuse circumflex disease Patency of mid RCA stent     I have reviewed the patient's radiology reports.    Medication Review:     I have reviewed the patient's active and prn medications.     Problem List:      Old MI (myocardial infarction)      Assessment/Plan:    Sepsis  Bilateral pneumonia  NSTEMI   TYRELL  Type 2 diabetes  CHF  Hypertension  Hyperlipidemia  Obesity  Impaired mobility and ADLs        Plan:     Sepsis  Bilateral pneumonia  SIRS criteria: Fever, tachycardia, leukocytosis  Empiric broad-spectrum antibiotics vancomycin and Zosyn  CT chest suggest pneumonia  DuoNebs as needed  MRSA screen  Follow culture results  NSTEMI   Troponin greater than 3000 x 2  Dr. Wilson consulted and patient taken from the ER to heart cath  Chronic LAD occlusion,  severe diffuse circumflex disease.  Patency of mid RCA stent  Recommend GDMT, DAPT  Echo EF 35% grade 2 diastolic, wall motion abnormalities  TYRELL  Likely multifactorial in the setting of sepsis and NSTEMI  Nephrology consultation  Suspect component of cardiorenal  Type 2 diabetes  Glucomander protocol  A1c 10.2  Diabetic education  Obesity  Impaired mobility and ADLs        Further orders as clinical course dictates.  Discussed case with patient's long-term girlfriend.  No next of kin.  Confirmed CODE STATUS.  Discussed overall poor prognosis.        DVT Prophylaxis: Heparin sq  Code Status: Full  Diet: Cardiac/Diabetic/Renal  Disposition: pending transfer to [facility]  Edited by: Waqar Swan DO at 6/17/2025 1145           Waqar Swan DO  06/17/25  11:49 EDT    Dictated utilizing Dragon dictation.

## 2025-06-17 NOTE — CASE MANAGEMENT/SOCIAL WORK
Discharge Planning Assessment  [facility]     Patient Name: Deep Mendez  MRN: [id number]  Today's Date: 6/17/2025    Admit Date: 6/16/2025    Plan: Transfer to [facility] or [place], pending an available bed   Discharge Needs Assessment       Row Name 06/17/25 1233       Living Environment    People in Home significant other    Name(s) of People in Home significant other, Dania    Primary Care Provided by self    Provides Primary Care For no one, unable/limited ability to care for self    Family Caregiver if Needed significant other    Family Caregiver Names Significant other, Dania    Quality of Family Relationships unable to assess    Able to Return to Prior Arrangements yes       Resource/Environmental Concerns    Transportation Concerns none       Transition Planning    Patient/Family Anticipates Transition to home with family    Patient/Family Anticipated Services at Transition     Transportation Anticipated family or friend will provide       Discharge Needs Assessment    Readmission Within the Last 30 Days no previous admission in last 30 days    Equipment Currently Used at Home walker, rolling    Concerns to be Addressed discharge planning                   Discharge Plan       Row Name 06/17/25 123       Plan    Plan Transfer to [facility] or [place], pending an available bed    Patient/Family in Agreement with Plan yes    Plan Comments Spoke to patient at bedside to initiate discharge planning and delivered IMM. He lives at home with his significant other in [facility]. Prior to admission, he was independent with ADL's and drives. He states uses a rolling walker for assist. He is not current with home health and does not use home oxygen. His PCP is Sami Fisher.  Confirmed demographics. He uses [facility]. He is not agreeable to meds to bed. His plan is to transfer to [facility] or [place], pending an available bed. CM will continue to follow.                  Selected Continued Care - Prior  Encounters Includes continued care and service providers with selected services from prior encounters from 3/18/2025 to 6/17/2025      Discharged on 5/23/2025 Admission date: 5/23/2025 - Discharge disposition: [facility] ([place])      Destination       Service Provider Services Address Phone Fax Patient Preferred    [facility] [address], [place] [address] [phone] --                             Demographic Summary       Row Name 06/17/25 1232       General Information    Admission Type inpatient    Arrived From emergency department    Required Notices Provided Important Message from Medicare    Referral Source admission list    Reason for Consult discharge planning                   Functional Status       Row Name 06/17/25 1233       Functional Status    Usual Activity Tolerance fair    Current Activity Tolerance fair       Functional Status, IADL    Medications independent    Meal Preparation independent    Housekeeping independent    Laundry independent    Shopping independent                   Psychosocial    No documentation.                  Abuse/Neglect    No documentation.                  Legal    No documentation.                  Substance Abuse    No documentation.                  Patient Forms    No documentation.                     Deanne Knutson RN

## 2025-06-17 NOTE — PROGRESS NOTES
"G-Cardiology Progress note     LOS: 1 day   Patient Care Team:  Sami Fisher MD as PCP - General (Internal Medicine)  Provider, No Known as PCP - Family Medicine  Yazmin Zayas MD as Surgeon (General Surgery)  Kaye Stanton PA-C as Physician Assistant (Endocrinology)    Chief Complaint: Nausea    Subjective:    Interval History: The patient has defervesced.  White blood cell count is trending down.    Patient Complaints: none  Patient Denies:   Chest pain, shortness of breath, orthopnea, peripheral edema, palpitations, cough, sputum production, hemoptysis, abdominal pain, nausea, vomiting, diarrhea, fevers chills or night sweats  History taken from: patient family    Review of Systems:   All systems were reviewed and negative       Objective:    Vital Sign Min/Max for last 24 hours  Temp  Min: 98 °F (36.7 °C)  Max: 103.4 °F (39.7 °C)   BP  Min: 82/58  Max: 138/89   Pulse  Min: 84  Max: 122   Resp  Min: 16  Max: 20   SpO2  Min: 92 %  Max: 98 %   Flow (L/min) (Oxygen Therapy)  Min: 2  Max: 2.5   Weight  Min: 121 kg (265 lb 14 oz)  Max: 121 kg (266 lb 12.1 oz)     Flowsheet Rows      Flowsheet Row First Filed Value   Admission Height 182.9 cm (72\") Documented at 06/16/2025 1924   Admission Weight 121 kg (265 lb 14 oz) Documented at 06/16/2025 2300            Physical Exam:     General Appearance:  Alert, cooperative, in no acute distress   Head:  Normocephalic, without obvious abnormality, atraumatic   Eyes:  Lids and lashes normal, conjunctivae and sclerae normal, no icterus, no pallor, corneas clear, PERRLA   Ears:  Ears appear intact with no abnormalities noted   Throat:  No oral lesions, no thrush, oral mucosa moist   Neck:  No adenopathy, supple, trachea midline, no thyromegaly, no carotid bruit, no JVD   Back:  No kyphosis present, no scoliosis present, no skin lesions, erythema or scars, no tenderness to percussion or palpation, range of motion normal   Lungs:  Clear to auscultation, respirations " regular, even and unlabored    Heart:  Regular rhythm and normal rate, normal S1 and S2, no murmur, no gallop, no rub, no click   Chest Wall:  No abnormalities observed   Abdomen:  Normal bowel sounds, no masses, no organomegaly, soft non-tender, non-distended, no guarding, no rebound tenderness   Rectal:  Deferred   Extremities:  Moves all extremities well, no edema, no cyanosis, no redness.  Left BKA   Pulses:  Pulses palpable and equal bilaterally   Skin:  No bleeding, bruising or rash   Lymph nodes:  No palpable adenopathy   Neurologic:  Cranial nerves 2 - 12 grossly intact, sensation intact, DTR present and equal bilaterally                                                                                Results Review:     I reviewed the patient's new clinical results.  Results from last 7 days   Lab Units 06/17/25  0408   HEMOGLOBIN A1C % 10.20*     Results from last 7 days   Lab Units 06/17/25  0409   SODIUM mmol/L 128*   POTASSIUM mmol/L 4.9   CHLORIDE mmol/L 97*   CO2 mmol/L 15.8*   BUN mg/dL 38.0*   CREATININE mg/dL 1.86*   GLUCOSE mg/dL 184*   CALCIUM mg/dL 8.2*     Results from last 7 days   Lab Units 06/17/25  0409 06/16/25  1950   WBC 10*3/mm3 20.86* 23.48*   HEMOGLOBIN g/dL 11.2* 11.9*   HEMATOCRIT % 34.1* 34.9*   PLATELETS 10*3/mm3 184 212         Echo EF Estimated  Lab Results   Component Value Date    ECHOEFEST 52.0 05/23/2025         Physical Exam    Medication Review: yes    Assessment/Plan:      Old MI (myocardial infarction)      I have had a discussion with the patient and family members regarding his ischemic heart disease.  I think further revascularization options are quite limited.  The patient and family are considering the option of transfer to a tertiary care heart [facility] versus [facility] for further opinions regarding options going forward.        Cornelio Wilson MD  06/17/25  10:18 EDT

## 2025-06-17 NOTE — CONSULTS
[facility] Consult Note      Patient Name: Deep Mendez  : 1965  MRN: [id number]  Primary Care Physician:  Sami Fisher MD  Referring Physician: No ref. provider found  Date of admission: 2025    Subjective     Reason for Consult:  TYRELL    HPI:   Deep Mendez is a 59 y.o. male with a past medical history of coronary artery disease congestive heart failure systolic with EF 35% and grade 2 diastolic dysfunction.  Diabetes hypertension hyperlipidemia impaired mobility came into the hospital with complaints of nausea and weakness found to have elevated troponins   Also with evidence of sepsis secondary to pneumonia presented with leukocytosis and high fever.  He has a history of severe three-vessel disease Which was done down due to the lack of distal targets.  He had stents done on multiple vessels recently.  Also has a history of BKA on the left side.  He had a heart cath done yesterday straight from the ER which showed a chronic LAD occlusion severe diffuse circumflex disease patent mid RCA stent.    He presented with a sodium of 126 which is 128 today creatinine of 1.91 which is down to 1.86 bicarb was 17.1 which is further down to 15.8 and with a BNP of 13,479.  A1c is 10.2 troponin on arrival was 3298 lactic acid 2.0 prolactin was also high WBC count was 23 on arrival is down to 20 now  He has renal function fluctuations in the past and recently as well but most recent creatinine was 1.08 it has peaked to 1.71 in 2024    He is a poor historian resting comfortably currently no chest pain no shortness of breath blood pressure has been stable renal consulted for the management of renal insufficiency          Review of Systems:   14 point review of systems is otherwise negative except for mentioned above on HPI    Personal History     Past Medical History:   Diagnosis Date    Acute appendicitis with perforation, localized peritonitis, abscess, and gangrene  03/13/2024    Diabetes mellitus     Hypertension     Impaired mobility     Rupture of appendix 03/14/2024    Type 2 diabetes mellitus        Past Surgical History:   Procedure Laterality Date    APPENDECTOMY N/A 3/13/2024    Procedure: APPENDECTOMY LAPAROSCOPIC;  Surgeon: Yazmin Zayas MD;  Location: [facility] OR;  Service: General;  Laterality: N/A;    BELOW KNEE LEG AMPUTATION      CARDIAC CATHETERIZATION N/A 5/23/2025    Procedure: Coronary angiography;  Surgeon: Cornelio Wilson MD;  Location: [facility] CATH INVASIVE LOCATION;  Service: Cardiology;  Laterality: N/A;    CARDIAC CATHETERIZATION N/A 6/16/2025    Procedure: Left Heart Cath;  Surgeon: Cornelio Wilson MD;  Location: [facility] CATH INVASIVE LOCATION;  Service: Cardiology;  Laterality: N/A;    EYE SURGERY         Family History: family history includes Diabetes in his father, mother, and sister.    Social History:  reports that he has quit smoking. His smoking use included cigarettes. He has never used smokeless tobacco. He reports that he does not drink alcohol and does not use drugs.    Home Medications:  Prior to Admission medications    Medication Sig Start Date End Date Taking? Authorizing Provider   aspirin 81 MG EC tablet Take 1 tablet by mouth Daily. 6/18/25  Yes Waqar Swan, DO   Lidocaine 4 % Place 1 patch on the skin as directed by provider Daily for 30 days. Remove & Discard patch within 12 hours or as directed by MD 6/17/25 7/17/25 Yes Waqar Swan, DO   ticagrelor (BRILINTA) 90 MG tablet tablet Take 1 tablet by mouth 2 (Two) Times a Day. 6/17/25  Yes Waqar Swan, DO   aspirin 325 MG EC tablet Take 1 tablet by mouth Daily. 5/23/25   Janel Lorenzo APRN   atorvastatin (LIPITOR) 80 MG tablet Take 1 tablet by mouth Daily.  Patient taking differently: Take 0.5 tablets by mouth Daily. 5/24/25   Janel Lorenzo APRN   dapagliflozin Propanediol (Farxiga) 10 MG tablet Take 10 mg by mouth Daily.    Provider, Historical,  MD   furosemide (LASIX) 40 MG tablet Take 1 tablet by mouth At Night As Needed.    ProviderQuoc MD   glucagon (GLUCAGEN) 1 MG injection Inject 1 mg under the skin into the appropriate area as directed 1 (One) Time As Needed (severe hypoglycemia) for up to 1 dose. 8/20/24   Kaye Stanton PA-C   Insulin Lispro, 1 Unit Dial, (HumaLOG KwikPen) 100 UNIT/ML solution pen-injector Inject 45 Units under the skin into the appropriate area as directed 3 (Three) Times a Day With Meals. Add additional correction 2 unit for every 50 mg/dl over 150. Max daily dose 200 units. 8/20/24   Kaye Stanton PA-C   lisinopril (PRINIVIL,ZESTRIL) 5 MG tablet Take 1 tablet by mouth Daily.    Quoc Jones MD   metoprolol succinate XL (TOPROL-XL) 25 MG 24 hr tablet Take 1 tablet by mouth Daily.    Quoc Jones MD   Niacin ER 1000 MG tablet controlled-release Take 1,000 mg by mouth Daily.    Quoc Jones MD   ticagrelor (BRILINTA) 90 MG tablet tablet Take 1 tablet by mouth 2 (Two) Times a Day.    Quoc Jones MD   TIRZEPATIDE SC Inject  under the skin into the appropriate area as directed.    Provider, Historical, MD   Toujeo Max SoloStar 300 UNIT/ML solution pen-injector injection Inject 114 Units under the skin into the appropriate area as directed Daily. Titrate 2 units every 3 days if fasting blood sugar is more than 140. Max daily dose 120 units. 8/20/24   Kaye Stanton PA-C   valsartan (DIOVAN) 40 MG tablet Take 1 tablet by mouth Daily.    Quoc Jones MD       Allergies:  Allergies   Allergen Reactions    Duloxetine Hcl Other (See Comments)     Nose bleeds       Objective     Vitals:   Temp:  [98 °F (36.7 °C)-103.4 °F (39.7 °C)] 98 °F (36.7 °C)  Heart Rate:  [] 93  Resp:  [16-20] 20  BP: ()/() 148/104  Flow (L/min) (Oxygen Therapy):  [2-2.5] 2    Intake/Output Summary (Last 24 hours) at 6/17/2025 1706  Last data filed at 6/17/2025 1700  Gross per 24 hour   Intake  473 ml   Output 2425 ml   Net -1952 ml       Physical Exam:    General Appearance: alert, oriented x 3, no acute distress   Skin: warm and dry  HEENT: oral mucosa normal, nonicteric sclera  Neck: supple, no JVD  Lungs: CTA  Heart: RRR, normal S1 and S2  Abdomen: soft, nontender, nondistended  : no palpable bladder  Extremities: Left BKA right lower extremity ulcers  Neuro: normal speech and mental status     Scheduled Meds:     ampicillin-sulbactam, 3 g, Intravenous, Q6H  aspirin, 81 mg, Oral, Daily  atorvastatin, 80 mg, Oral, Daily  heparin (porcine), 5,000 Units, Subcutaneous, Q12H  insulin glargine, 1-200 Units, Subcutaneous, Nightly - Glucommander  insulin lispro, 1-200 Units, Subcutaneous, 4x Daily With Meals & Nightly  Lidocaine, 1 patch, Transdermal, Q24H  mupirocin, 1 Application, Each Nare, BID  senna-docusate sodium, 2 tablet, Oral, BID  sodium chloride, 10 mL, Intravenous, Q12H  ticagrelor, 90 mg, Oral, BID      IV Meds:   sodium chloride, 10 mL/hr, Last Rate: 10 mL/hr (06/17/25 0033)        Results Reviewed:   I have personally reviewed the results from the time of this admission to 6/17/2025 17:06 EDT     Lab Results   Component Value Date    GLUCOSE 184 (H) 06/17/2025    CALCIUM 8.2 (L) 06/17/2025     (L) 06/17/2025    K 4.9 06/17/2025    CO2 15.8 (L) 06/17/2025    CL 97 (L) 06/17/2025    BUN 38.0 (H) 06/17/2025    CREATININE 1.86 (H) 06/17/2025    BCR 20.4 06/17/2025    ANIONGAP 15.2 (H) 06/17/2025      Lab Results   Component Value Date    MG 1.6 05/30/2025    ALBUMIN 3.3 (L) 06/16/2025           Assessment / Plan     ASSESSMENT:  Acute kidney injury nonoliguric secondary to hemodynamic compromise in the setting of NSTEMI and sepsis creatinine peaked at 1.91 down to 1.86-  He had a liter of Ringer's lactate bolus initially    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 and hypertensive nephrosclerosis with variations in the past    Essential hypertension-blood pressure has been stable with  some fluctuations it has been low initially    Hyponatremia-hypervolemic in the setting of systolic congestive heart failure and acute renal failure    Metabolic acidosis in the setting of sepsis and renal failure    Leukocytosis possibly due to stress response along with sepsis      Bilateral lower lobe pneumonia as evidenced on the CT scan-on empiric antibiotics      PLAN:  -Monitor for the volume overload  -Monitor for the bicarb if it drops-will need bicarb supplements may not be able to use the bicarb based fluids for fast correction we just need to give him separate boluses of amps of bicarb plus may need to start him on sodium bicarbonate  -Dose medication per GFR  Intake and output charting  -aim for maps greater than 65  -Avoid nephrotoxins    Thank you for involving us in the care of Deep Mendez.  Please feel free to call with any questions.    Tiera Blanc MD  06/17/25  17:06 EDT    [facility]  [phone]

## 2025-06-17 NOTE — NURSING NOTE
Referral received for Phase I Cardiac Rehab. Staff reviewed chart and patient has a qualifying diagnosis for Phase II Cardiac Rehab. Met with patient, discussed benefits of exercise, program protocol with educational material provided. Pt undecided at this time, but states if he decides to do program, he will be using our facility. I will follow up with patient after discharge.

## 2025-06-17 NOTE — PAYOR COMM NOTE
"TO:[facility]  FROM:GALINA SNEED,RN PHONE [phone]  CLINICALS REF# [id number]    AndreaJoya (59 y.o. Male)       Date of Birth   1965    Social Security Number       Address   [address] [place] [place] [address]    Home Phone   [phone]    MRN   [id number]       Rastafarian   None    Marital Status   Single                            Admission Date   2025    Admission Type   Emergency    Admitting Provider   Cornelio Wilson MD    Attending Provider   Waqar Swan DO    Department, Room/Bed   [facility] INTENSIVE CARE, I02/       Discharge Date       Discharge Disposition       Discharge Destination                                 Attending Provider: Waqar Swan DO    Allergies: Duloxetine Hcl    Isolation: None   Infection: None   Code Status: CPR    Ht: 182.9 cm (72\")   Wt: 121 kg (265 lb 14 oz)    Admission Cmt: None   Principal Problem: Old MI (myocardial infarction) [I25.2]                   Active Insurance as of 2025       Primary Coverage       Payor Plan Insurance Group Employer/Plan Group    [employer] MEDICARE REPLACEMENT UHC MEDICARE ADVANTAGE [facility] HMO NON PAR [id number]       Payor Plan Address Payor Plan Phone Number Payor Plan Fax Number Effective Dates    [address] [address]   3/1/2025 - None Entered    [facility] [id number]         Subscriber Name Subscriber Birth Date Member ID       JOYA MENDEZ 1965 [id number]                     Emergency Contacts        (Rel.) Home Phone Work Phone Mobile Phone    TODD PEDRO (Significant Other) [phone] -- --                 History & Physical        Teddy Rogel DO at 25 [facility]7            [facility] HOSPITALIST   HISTORY AND PHYSICAL      Name:  Joya Mendez   Age:  59 y.o.  Sex:  male  :  1965  MRN:  [id number]   Visit Number:  [id number]  Admission Date:  2025  Date Of Service:  25  Primary Care " Physician:  Sami Fisher MD    Chief Complaint:     Multiple complaint    History Of Presenting Illness:      Patient is a chronically ill 59-year-old male with history significant for multivessel CAD, type 2 diabetes, hypertension/hyperlipidemia who presents to the emergency room from home with multiple complaints.  Of note, patient taken emergently from ER to heart cath due to concerns for NSTEMI with troponins greater than 3000 x 2.  Patient seen post heart cath.  Denies chest pain, shortness of breath, cough.  Does admit to some generalized weakness and nausea without vomiting.  Patient's girlfriend also very poor historian.  History unreliable.  Patient was noted to be positive for sepsis.  Suspect that likely source bilateral pneumonia.  Will continue broad-spectrum empiric antibiotics.    Review Of Systems:    All systems were reviewed and negative except as mentioned in history of presenting illness, assessment and plan.    Past Medical History: Patient  has a past medical history of Acute appendicitis with perforation, localized peritonitis, abscess, and gangrene (03/13/2024), Diabetes mellitus, Hypertension, Impaired mobility, Rupture of appendix (03/14/2024), and Type 2 diabetes mellitus.    Past Surgical History: Patient  has a past surgical history that includes Below knee leg amputation; Eye surgery; Appendectomy (N/A, 3/13/2024); and Cardiac catheterization (N/A, 5/23/2025).    Social History: Patient  reports that he has quit smoking. His smoking use included cigarettes. He has never used smokeless tobacco. He reports that he does not drink alcohol and does not use drugs.    Family History:  Patient's family history has been reviewed and found to be noncontributory.     Allergies:      Duloxetine hcl    Home Medications:    Prior to Admission Medications       Prescriptions Last Dose Informant Patient Reported? Taking?    aspirin 325 MG EC tablet   No No    Take 1 tablet by mouth Daily.     atorvastatin (LIPITOR) 80 MG tablet   No No    Take 1 tablet by mouth Daily.    dapagliflozin Propanediol (Farxiga) 10 MG tablet   Yes No    Take 10 mg by mouth Daily.    furosemide (LASIX) 40 MG tablet   Yes No    Take 1 tablet by mouth At Night As Needed.    glucagon (GLUCAGEN) 1 MG injection   No No    Inject 1 mg under the skin into the appropriate area as directed 1 (One) Time As Needed (severe hypoglycemia) for up to 1 dose.    Insulin Lispro, 1 Unit Dial, (HumaLOG KwikPen) 100 UNIT/ML solution pen-injector   No No    Inject 45 Units under the skin into the appropriate area as directed 3 (Three) Times a Day With Meals. Add additional correction 2 unit for every 50 mg/dl over 150. Max daily dose 200 units.    lisinopril (PRINIVIL,ZESTRIL) 5 MG tablet   Yes No    Take 1 tablet by mouth Daily.    TIRZEPATIDE SC   Yes No    Inject  under the skin into the appropriate area as directed.    Toujeo Max SoloStar 300 UNIT/ML solution pen-injector injection   No No    Inject 114 Units under the skin into the appropriate area as directed Daily. Titrate 2 units every 3 days if fasting blood sugar is more than 140. Max daily dose 120 units.          ED Medications:    Medications   vancomycin 2500 mg/500 mL 0.9% NS IVPB ([facility]) (2,500 mg Intravenous New Bag 6/16/25 8599)   atorvastatin (LIPITOR) tablet 80 mg (has no administration in time range)   sodium chloride 0.9 % infusion (has no administration in time range)   acetaminophen (TYLENOL) tablet 650 mg (has no administration in time range)   HYDROcodone-acetaminophen (NORCO) 5-325 MG per tablet 1 tablet (has no administration in time range)   morphine injection 4 mg (has no administration in time range)     And   naloxone (NARCAN) injection 0.4 mg (has no administration in time range)   ALPRAZolam (XANAX) tablet 0.25 mg (has no administration in time range)   temazepam (RESTORIL) capsule 15 mg (has no administration in time range)   ondansetron ODT (ZOFRAN-ODT)  disintegrating tablet 4 mg (has no administration in time range)     Or   ondansetron (ZOFRAN) injection 4 mg (has no administration in time range)   diphenhydrAMINE (BENADRYL) injection 25 mg (has no administration in time range)   aspirin EC tablet 81 mg (has no administration in time range)   ticagrelor (BRILINTA) tablet 90 mg (has no administration in time range)   nitroglycerin (NITROSTAT) SL tablet 0.4 mg (has no administration in time range)   sodium chloride 0.9 % flush 10 mL (has no administration in time range)   sodium chloride 0.9 % flush 10 mL (has no administration in time range)   sodium chloride 0.9 % infusion 40 mL (has no administration in time range)   mupirocin (BACTROBAN) 2 % nasal ointment 1 Application (has no administration in time range)   sennosides-docusate (PERICOLACE) 8.6-50 MG per tablet 2 tablet (has no administration in time range)     And   polyethylene glycol (MIRALAX) packet 17 g (has no administration in time range)     And   bisacodyl (DULCOLAX) EC tablet 5 mg (has no administration in time range)     And   bisacodyl (DULCOLAX) suppository 10 mg (has no administration in time range)   heparin (porcine) 5000 UNIT/ML injection 5,000 Units (has no administration in time range)   ondansetron ODT (ZOFRAN-ODT) disintegrating tablet 4 mg (has no administration in time range)     Or   ondansetron (ZOFRAN) injection 4 mg (has no administration in time range)   Pharmacy to dose vancomycin (has no administration in time range)   Pharmacy To Dose: Piperacillin-tazobactam (Zosyn) (has no administration in time range)   sodium chloride 0.9 % bolus 1,000 mL (0 mL Intravenous Stopped 6/16/25 2120)   acetaminophen (TYLENOL) tablet 1,000 mg (1,000 mg Oral Given 6/16/25 2001)   ondansetron (ZOFRAN) injection 8 mg (8 mg Intravenous Given 6/16/25 2002)   aspirin chewable tablet 324 mg (324 mg Oral Given 6/16/25 2002)   lactated ringers bolus 1,000 mL (1,000 mL Intravenous New Bag 6/16/25 2109)  "  piperacillin-tazobactam (ZOSYN) IVPB 4.5 g IVPB in 100 mL NS (VTB) (0 g Intravenous Stopped 6/16/25 2145)     Vital Signs:  Temp:  [98.2 °F (36.8 °C)-103.4 °F (39.7 °C)] 98.2 °F (36.8 °C)  Heart Rate:  [] 98  Resp:  [16-18] 18  BP: ()/(58-90) 109/66        06/16/25  2300   Weight: 121 kg (265 lb 14 oz)     Body mass index is 36.06 kg/m².    Physical Exam:     Most recent vital Signs: /66   Pulse 98   Temp 98.2 °F (36.8 °C) (Oral)   Resp 18   Ht 182.9 cm (72\")   Wt 121 kg (265 lb 14 oz)   SpO2 95%   BMI 36.06 kg/m²     Physical Exam  Vitals reviewed.   Constitutional:       General: He is not in acute distress.     Appearance: He is obese.   HENT:      Head: Normocephalic and atraumatic.      Right Ear: External ear normal.      Left Ear: External ear normal.      Mouth/Throat:      Mouth: Mucous membranes are moist.      Pharynx: Oropharynx is clear.   Eyes:      Extraocular Movements: Extraocular movements intact.      Conjunctiva/sclera: Conjunctivae normal.   Cardiovascular:      Rate and Rhythm: Normal rate and regular rhythm.      Pulses: Normal pulses.      Heart sounds: Normal heart sounds.   Pulmonary:      Effort: No respiratory distress.      Comments: Diminished breath sounds bilaterally  Abdominal:      General: Bowel sounds are normal.      Palpations: Abdomen is soft.   Musculoskeletal:      Left lower leg: No edema.      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   Neurological:      General: No focal deficit present.      Mental Status: He is alert. Mental status is at baseline.         Laboratory data:    I have reviewed the labs done in the emergency room.    Results from last 7 days   Lab Units 06/16/25  1950   SODIUM mmol/L 126*   POTASSIUM mmol/L 4.9   CHLORIDE mmol/L 92*   CO2 mmol/L 17.1*   BUN mg/dL 34.0*   CREATININE mg/dL 1.91*   CALCIUM mg/dL 8.5*   BILIRUBIN mg/dL 1.0   ALK PHOS U/L 79   ALT (SGPT) U/L 29   AST " (SGOT) U/L 37   GLUCOSE mg/dL 127*     Results from last 7 days   Lab Units 06/16/25 1950   WBC 10*3/mm3 23.48*   HEMOGLOBIN g/dL 11.9*   HEMATOCRIT % 34.9*   PLATELETS 10*3/mm3 212         Results from last 7 days   Lab Units 06/16/25 2121 06/16/25 1950   HSTROP T ng/L 3,086* 3,290*     Results from last 7 days   Lab Units 06/16/25 1950   PROBNP pg/mL 13,479.0*         Results from last 7 days   Lab Units 06/16/25 1950   LIPASE U/L 7*         Results from last 7 days   Lab Units 06/16/25  1939   COLOR UA  Yellow   GLUCOSE UA  Negative   KETONES UA  Trace*   BLOOD UA  Trace*   LEUKOCYTES UA  Negative   PH, URINE  5.5   BILIRUBIN UA  Negative   UROBILINOGEN UA  1.0 E.U./dL   RBC UA /HPF 0-2   WBC UA /HPF None Seen       Pain Management Panel          Latest Ref Rng & Units 6/16/2025   Pain Management Panel   Amphetamine, Urine Qual Negative Negative    Barbiturates Screen, Urine Negative Negative    Benzodiazepine Screen, Urine Negative Negative    Buprenorphine, Screen, Urine Negative Negative    Cocaine Screen, Urine Negative Negative    Fentanyl, Urine Negative Negative    Methadone Screen , Urine Negative Negative    Methamphetamine, Ur Negative Negative        EKG:          Radiology:    Cardiac Catheterization/Vascular Study  Result Date: 6/16/2025  Chronic LAD occlusion Severe diffuse circumflex disease Patency of mid RCA stent       Assessment:    Sepsis  Bilateral pneumonia  NSTEMI   TYRELL  Type 2 diabetes  CHF  Hypertension  Hyperlipidemia  Obesity  Impaired mobility and ADLs      Plan:    Sepsis  Bilateral pneumonia  SIRS criteria: Fever, tachycardia, leukocytosis  Empiric broad-spectrum antibiotics vancomycin and Zosyn  CT chest pending  DuoNebs as needed  MRSA screen  Follow culture results  NSTEMI   Troponin greater than 3000 x 2  Dr. Wilson consulted and patient taken from the ER to heart cath  Chronic LAD occlusion, severe diffuse circumflex disease.  Patency of mid RCA stent  Recommend GDMT,  DAPT  Echo pending  TYRELL  Likely multifactorial in the setting of sepsis and NSTEMI  Nephrology consultation  Type 2 diabetes  Glucomander protocol  A1c pending  Diabetic education  Obesity  Impaired mobility and ADLs      Further orders as clinical course dictates.  Discussed case with patient's long-term girlfriend.  No next of kin.  Confirmed CODE STATUS.  Discussed overall poor prognosis.      Risk Assessment: High  DVT Prophylaxis: Heparin sq  Code Status: Full  Diet: Cardiac/Diabetic/Renal          Teddy Rogel DO  06/16/25  23:47 EDT    Dictated utilizing Dragon dictation.      Electronically signed by Teddy Rogel DO at 06/17/25 0133       Emergency Department Notes    No notes of this type exist for this encounter.       Vital Signs (last day)       Date/Time Temp Temp src Pulse Resp BP Patient Position SpO2    06/17/25 0712 -- -- 86 -- 137/97 -- 98    06/17/25 0700 98.7 (37.1) Oral 88 -- 133/105 -- 98    06/17/25 0600 -- -- 89 20 138/89 Lying 95    06/17/25 0500 -- -- 84 -- 123/76 -- 95    06/17/25 0430 -- -- 85 -- 124/79 -- 96    06/17/25 0400 -- -- 87 20 119/79 Lying 95    06/17/25 0335 -- -- 90 -- -- -- 96    06/17/25 0300 -- -- 92 -- 116/78 -- 98    06/17/25 0230 -- -- 92 -- 112/75 -- 98    06/17/25 0200 -- -- 93 20 111/67 Lying 97    06/17/25 0130 -- -- 93 -- 113/72 -- 97    06/17/25 0100 -- -- 98 -- 116/85 -- 96    06/17/25 0045 -- -- 98 -- 117/94 -- 98    06/17/25 0030 -- -- 98 -- 110/80 -- 95    06/17/25 0015 -- -- 98 -- 125/77 -- 97    06/17/25 0000 98 (36.7) Oral 98 18 118/79 Lying 97    06/16/25 2345 -- -- 98 -- 108/71 -- 94    06/16/25 2330 -- -- 98 -- 109/66 -- 95    06/16/25 2315 -- -- 97 -- 105/64 -- 95    06/16/25 2300 98.2 (36.8) Oral 98 18 90/65 Lying 95    06/16/25 22:25:02 -- -- 103 18 112/72 -- 92    06/16/25 22:21:25 -- -- 103 18 112/72 -- 92    06/16/25 21:51:49 -- -- 97 18 99/66 -- --    06/16/25 21:51:22 -- -- -- -- -- -- 92    06/16/25 2121 -- -- 104 18 82/58 Lying 94     06/16/25 21:13:47 100.3 (37.9) Oral 106 18 95/60 Lying 95    06/16/25 2100 -- -- 110 16 95/60 Sitting 93    06/16/25 2030 -- -- 120 16 115/90 Sitting 95    06/16/25 1940 -- -- 122 -- 118/75 -- 95    06/16/25 1925 -- -- 121 -- 120/70 -- --    06/16/25 1924 -- -- -- -- 116/76 -- --    06/16/25 1922 -- -- 121 -- -- -- 94    06/16/25 1920 103.4 (39.7) Oral -- 16 -- -- --          Current Facility-Administered Medications   Medication Dose Route Frequency Provider Last Rate Last Admin    acetaminophen (TYLENOL) tablet 650 mg  650 mg Oral Q4H PRN Cornelio Wilson MD        ALPRAZolam (XANAX) tablet 0.25 mg  0.25 mg Oral TID PRN Cornelio Wilson MD   0.25 mg at 06/17/25 0042    aspirin EC tablet 81 mg  81 mg Oral Daily Cornelio Wilson MD   81 mg at 06/17/25 0853    atorvastatin (LIPITOR) tablet 80 mg  80 mg Oral Daily Cornelio Wilson MD   80 mg at 06/17/25 0853    sennosides-docusate (PERICOLACE) 8.6-50 MG per tablet 2 tablet  2 tablet Oral BID Teddy Rogel DO   2 tablet at 06/17/25 0031    And    polyethylene glycol (MIRALAX) packet 17 g  17 g Oral Daily PRN Teddy Rogel DO        And    bisacodyl (DULCOLAX) EC tablet 5 mg  5 mg Oral Daily PRN Teddy Rogel DO        And    bisacodyl (DULCOLAX) suppository 10 mg  10 mg Rectal Daily PRN Teddy Rogel DO        dextrose (D50W) (25 g/50 mL) IV injection 10-50 mL  10-50 mL Intravenous Q15 Min PRN Teddy Rogel DO        dextrose (GLUTOSE) oral gel 15 g  15 g Oral Q15 Min PRN Teddy Rogel DO        diphenhydrAMINE (BENADRYL) injection 25 mg  25 mg Intravenous Q6H PRN Cornelio Wilson MD        Glucagon (GLUCAGEN) injection 1 mg  1 mg Intramuscular Q15 Min PRN Teddy Rogel DO        heparin (porcine) 5000 UNIT/ML injection 5,000 Units  5,000 Units Subcutaneous Q12H Teddy Rogel DO   5,000 Units at 06/17/25 0853    HYDROcodone-acetaminophen (NORCO) 5-325 MG per tablet 1 tablet  1 tablet Oral Q4H PRN Cornelio Wilson MD   1 tablet at  06/17/25 0859    insulin glargine (LANTUS, SEMGLEE) injection 1-200 Units  1-200 Units Subcutaneous Nightly - Glucommander Teddy Rogel DO   30 Units at 06/17/25 0032    insulin lispro (humaLOG) injection 1-200 Units  1-200 Units Subcutaneous 4x Daily With Meals & Nightly Teddy Rogel DO   6 Units at 06/17/25 0853    insulin lispro (humaLOG) injection 1-200 Units  1-200 Units Subcutaneous PRN Teddy Rogel DO   1 Units at 06/17/25 0032    morphine injection 4 mg  4 mg Intravenous Q1H PRN Cornelio Wilson MD        And    naloxone (NARCAN) injection 0.4 mg  0.4 mg Intravenous Q5 Min PRN Cornelio Wilson MD        mupirocin (BACTROBAN) 2 % nasal ointment 1 Application  1 Application Each Nare BID Teddy Rogel DO   1 Application at 06/17/25 0856    nitroglycerin (NITROSTAT) SL tablet 0.4 mg  0.4 mg Sublingual Q5 Min PRN Teddy Rogel DO        ondansetron ODT (ZOFRAN-ODT) disintegrating tablet 4 mg  4 mg Oral Q6H PRN Cornelio Wilson MD        Or    ondansetron (ZOFRAN) injection 4 mg  4 mg Intravenous Q6H PRN Cornelio Wilson MD   4 mg at 06/17/25 0042    ondansetron ODT (ZOFRAN-ODT) disintegrating tablet 4 mg  4 mg Oral Q6H PRN Teddy Rogel DO        Or    ondansetron (ZOFRAN) injection 4 mg  4 mg Intravenous Q6H PRN Teddy Rogel DO        Pharmacy to dose vancomycin   Not Applicable Continuous PRN Teddy Rogel DO        Pharmacy To Dose: Piperacillin-tazobactam (Zosyn)   Not Applicable Continuous PRN Teddy Rogel DO        piperacillin-tazobactam (ZOSYN) IVPB 4.5 g IVPB in 100 mL NS (VTB)  4.5 g Intravenous Q8H Teddy Rogel DO   4.5 g at 06/17/25 0412    sodium chloride 0.9 % flush 10 mL  10 mL Intravenous Q12H Teddy Rogel DO   10 mL at 06/17/25 0854    sodium chloride 0.9 % flush 10 mL  10 mL Intravenous PRN Teddy Rogel DO        sodium chloride 0.9 % infusion 40 mL  40 mL Intravenous PRN Teddy Rogel DO        sodium chloride 0.9 % infusion  10 mL/hr  "Intravenous Continuous Cornelio Wilson MD 10 mL/hr at 06/17/25 0033 10 mL/hr at 06/17/25 0033    temazepam (RESTORIL) capsule 15 mg  15 mg Oral Nightly PRN Cornelio Wilson MD        ticagrelor (BRILINTA) tablet 90 mg  90 mg Oral BID Cornelio Wilson MD   90 mg at 06/17/25 0856    vancomycin (dosing per levels)   Not Applicable Daily Teddy Rogel DO        vancomycin 750 mg in sodium chloride 0.9 % 250 mL IVPB-VTB  750 mg Intravenous Once Teddy Rogel DO         Lab Results (last 24 hours)       Procedure Component Value Units Date/Time    POC Glucose 4x Daily Before Meals & at Bedtime [[id number]]  (Abnormal) Collected: 06/17/25 0700    Specimen: Blood Updated: 06/17/25 0719     Glucose 174 mg/dL      Comment: Serial Number: [id number]:  [id number]       Procalcitonin [[id number]]  (Abnormal) Collected: 06/17/25 0408    Specimen: Blood Updated: 06/17/25 0603     Procalcitonin 0.98 ng/mL     Narrative:      As a Marker for Sepsis (Non-Neonates):    1. <0.5 ng/mL represents a low risk of severe sepsis and/or septic shock.  2. >2 ng/mL represents a high risk of severe sepsis and/or septic shock.    As a Marker for Lower Respiratory Tract Infections that require antibiotic therapy:    PCT on Admission    Antibiotic Therapy       6-12 Hrs later    >0.5                Strongly Recommended  >0.25 - <0.5        Recommended   0.1 - 0.25          Discouraged              Remeasure/reassess PCT  <0.1                Strongly Discouraged     Remeasure/reassess PCT    As 28 day mortality risk marker: \"Change in Procalcitonin Result\" (>80% or <=80%) if Day 0 (or Day 1) and Day 4 values are available. Refer to http://[email or web address]-pct-calculator.com    Change in PCT <=80%  A decrease of PCT levels below or equal to 80% defines a positive change in PCT test result representing a higher risk for 28-day all-cause mortality of patients diagnosed with severe sepsis for septic shock.    Change in PCT >80%  A decrease of " PCT levels of more than 80% defines a negative change in PCT result representing a lower risk for 28-day all-cause mortality of patients diagnosed with severe sepsis or septic shock.       Basic Metabolic Panel [[id number]]  (Abnormal) Collected: 06/17/25 0409    Specimen: Blood Updated: 06/17/25 0603     Glucose 184 mg/dL      BUN 38.0 mg/dL      Creatinine 1.86 mg/dL      Sodium 128 mmol/L      Potassium 4.9 mmol/L      Chloride 97 mmol/L      CO2 15.8 mmol/L      Calcium 8.2 mg/dL      BUN/Creatinine Ratio 20.4     Anion Gap 15.2 mmol/L      eGFR 41.4 mL/min/1.73     Narrative:      GFR Categories in Chronic Kidney Disease (CKD)              GFR Category          GFR (mL/min/1.73)    Interpretation  G1                    90 or greater        Normal or high (1)  G2                    60-89                Mild decrease (1)  G3a                   45-59                Mild to moderate decrease  G3b                   30-44                Moderate to severe decrease  G4                    15-29                Severe decrease  G5                    14 or less           Kidney failure    (1)In the absence of evidence of kidney disease, neither GFR category G1 or G2 fulfill the criteria for CKD.    eGFR calculation 2021 CKD-EPI creatinine equation, which does not include race as a factor    Lipid Panel [[id number]]  (Abnormal) Collected: 06/17/25 0409    Specimen: Blood Updated: 06/17/25 0603     Total Cholesterol 82 mg/dL      Triglycerides 71 mg/dL      HDL Cholesterol 20 mg/dL      LDL Cholesterol  46 mg/dL      VLDL Cholesterol 16 mg/dL      LDL/HDL Ratio 2.39    Narrative:      Cholesterol Reference Ranges  (U.S. Department of Health and Human Services ATP III Classifications)    Desirable          <200 mg/dL  Borderline High    200-239 mg/dL  High Risk          >240 mg/dL      Triglyceride Reference Ranges  (U.S. Department of Health and Human Services ATP III Classifications)    Normal           <150  mg/dL  Borderline High  150-199 mg/dL  High             200-499 mg/dL  Very High        >500 mg/dL    HDL Reference Ranges  (U.S. Department of Health and Human Services ATP III Classifications)    Low     <40 mg/dl (major risk factor for CHD)  High    >60 mg/dl ('negative' risk factor for CHD)        LDL Reference Ranges  (U.S. Department of Health and Human Services ATP III Classifications)    Optimal          <100 mg/dL  Near Optimal     100-129 mg/dL  Borderline High  130-159 mg/dL  High             160-189 mg/dL  Very High        >189 mg/dL    LDL is calculated using the NIH LDL-C calculation.      Vancomycin, Random [[id number]]  (Normal) Collected: 06/17/25 0409    Specimen: Blood Updated: 06/17/25 0603     Vancomycin Random 24.60 mcg/mL     Narrative:      Therapeutic Ranges for Vancomycin    Vancomycin Random   5.0-40.0 mcg/mL  Vancomycin Trough   5.0-20.0 mcg/mL  Vancomycin Peak     20.0-40.0 mcg/mL    Hemoglobin A1c [[id number]]  (Abnormal) Collected: 06/17/25 0408    Specimen: Blood Updated: 06/17/25 0557     Hemoglobin A1C 10.20 %     Narrative:      Hemoglobin A1C Ranges:    Increased Risk for Diabetes  5.7% to 6.4%  Diabetes                     >= 6.5%  Diabetic Goal                < 7.0%    CBC Auto Differential [[id number]]  (Abnormal) Collected: 06/17/25 0409    Specimen: Blood Updated: 06/17/25 0530     WBC 20.86 10*3/mm3      RBC 3.89 10*6/mm3      Hemoglobin 11.2 g/dL      Hematocrit 34.1 %      MCV 87.7 fL      MCH 28.8 pg      MCHC 32.8 g/dL      RDW 13.6 %      RDW-SD 43.7 fl      MPV 11.3 fL      Platelets 184 10*3/mm3      Neutrophil % 92.3 %      Lymphocyte % 3.0 %      Monocyte % 3.6 %      Eosinophil % 0.1 %      Basophil % 0.2 %      Immature Grans % 0.8 %      Neutrophils, Absolute 19.24 10*3/mm3      Lymphocytes, Absolute 0.62 10*3/mm3      Monocytes, Absolute 0.76 10*3/mm3      Eosinophils, Absolute 0.03 10*3/mm3      Basophils, Absolute 0.05 10*3/mm3      Immature Grans, Absolute 0.16  10*3/mm3      nRBC 0.0 /100 WBC     MRSA Screen, PCR (Inpatient) - Swab, Nares [[id number]] Collected: 06/17/25 0121    Specimen: Swab from Nares Updated: 06/17/25 0522    POC Glucose Once [[id number]]  (Abnormal) Collected: 06/17/25 0002    Specimen: Blood Updated: 06/17/25 0005     Glucose 189 mg/dL      Comment: Serial Number: [id number]:  [id number]       Fentanyl, Urine - Straight Cath [[id number]]  (Normal) Collected: 06/16/25 1939    Specimen: Urine from Straight Cath Updated: 06/16/25 2343     Fentanyl, Urine Negative    Narrative:      Negative Threshold:      Fentanyl 5 ng/mL     The normal value for the drug tested is negative. This report includes final unconfirmed screening results to be used for medical treatment purposes only. Unconfirmed results must not be used for non-medical purposes such as employment or legal testing. Clinical consideration should be applied to any drug of abuse test, particularly when unconfirmed results are used.           Urine Drug Screen - Straight Cath [[id number]]  (Normal) Collected: 06/16/25 1939    Specimen: Urine from Straight Cath Updated: 06/16/25 2339     THC, Screen, Urine Negative     Phencyclidine (PCP), Urine Negative     Cocaine Screen, Urine Negative     Methamphetamine, Ur Negative     Opiate Screen Negative     Amphetamine Screen, Urine Negative     Benzodiazepine Screen, Urine Negative     Tricyclic Antidepressants Screen Negative     Methadone Screen, Urine Negative     Barbiturates Screen, Urine Negative     Oxycodone Screen, Urine Negative     Buprenorphine, Screen, Urine Negative    Narrative:      Cutoff For Drugs Screened:    Amphetamines               500 ng/ml  Barbiturates               200 ng/ml  Benzodiazepines            150 ng/ml  Cocaine                    150 ng/ml  Methadone                  200 ng/ml  Opiates                    100 ng/ml  Phencyclidine               25 ng/ml  THC                         50 ng/ml  Methamphetamine             500 ng/ml  Tricyclic Antidepressants  300 ng/ml  Oxycodone                  100 ng/ml  Buprenorphine               10 ng/ml    The normal value for all drugs tested is negative. This report includes unconfirmed screening results, with the cutoff values listed, to be used for medical treatment purposes only.  Unconfirmed results must not be used for non-medical purposes such as employment or legal testing.  Clinical consideration should be applied to any drug of abuse test, particularly when unconfirmed results are used.      High Sensitivity Troponin T 1Hr [[id number]]  (Abnormal) Collected: 06/16/25 2121    Specimen: Blood Updated: 06/16/25 2203     HS Troponin T 3,086 ng/L      Troponin T Numeric Delta -204 ng/L      Troponin T % Delta -6    Narrative:      High Sensitive Troponin T Reference Range:  <14.0 ng/L- Negative Female for AMI  <22.0 ng/L- Negative Male for AMI  >=14 - Abnormal Female indicating possible myocardial injury.  >=22 - Abnormal Male indicating possible myocardial injury.   Clinicians would have to utilize clinical acumen, EKG, Troponin, and serial changes to determine if it is an Acute Myocardial Infarction or myocardial injury due to an underlying chronic condition.         High Sensitivity Troponin T [[id number]]  (Abnormal) Collected: 06/16/25 1950    Specimen: Blood from Arm, Left Updated: 06/16/25 2057     HS Troponin T 3,290 ng/L      Comment: Specimen hemolyzed.  Results may be falsely decreased.       Narrative:      High Sensitive Troponin T Reference Range:  <14.0 ng/L- Negative Female for AMI  <22.0 ng/L- Negative Male for AMI  >=14 - Abnormal Female indicating possible myocardial injury.  >=22 - Abnormal Male indicating possible myocardial injury.   Clinicians would have to utilize clinical acumen, EKG, Troponin, and serial changes to determine if it is an Acute Myocardial Infarction or myocardial injury due to an underlying chronic condition.         Procalcitonin [[id number]]   "(Abnormal) Collected: 06/16/25 1950    Specimen: Blood from Arm, Left Updated: 06/16/25 2045     Procalcitonin 0.30 ng/mL     Narrative:      As a Marker for Sepsis (Non-Neonates):    1. <0.5 ng/mL represents a low risk of severe sepsis and/or septic shock.  2. >2 ng/mL represents a high risk of severe sepsis and/or septic shock.    As a Marker for Lower Respiratory Tract Infections that require antibiotic therapy:    PCT on Admission    Antibiotic Therapy       6-12 Hrs later    >0.5                Strongly Recommended  >0.25 - <0.5        Recommended   0.1 - 0.25          Discouraged              Remeasure/reassess PCT  <0.1                Strongly Discouraged     Remeasure/reassess PCT    As 28 day mortality risk marker: \"Change in Procalcitonin Result\" (>80% or <=80%) if Day 0 (or Day 1) and Day 4 values are available. Refer to http://[email or web address]-pct-calculator.com    Change in PCT <=80%  A decrease of PCT levels below or equal to 80% defines a positive change in PCT test result representing a higher risk for 28-day all-cause mortality of patients diagnosed with severe sepsis for septic shock.    Change in PCT >80%  A decrease of PCT levels of more than 80% defines a negative change in PCT result representing a lower risk for 28-day all-cause mortality of patients diagnosed with severe sepsis or septic shock.       BNP [[id number]]  (Abnormal) Collected: 06/16/25 1950    Specimen: Blood from Arm, Left Updated: 06/16/25 2031     proBNP 13,479.0 pg/mL     Narrative:      This assay is used as an aid in the diagnosis of individuals suspected of having heart failure. It can be used as an aid in the diagnosis of acute decompensated heart failure (ADHF) in patients presenting with signs and symptoms of ADHF to the emergency department (ED). In addition, NT-proBNP of <300 pg/mL indicates ADHF is not likely.    Age Range Result Interpretation  NT-proBNP Concentration (pg/mL:      <50             Positive            >450   "                 Gray                 300-450                    Negative             <300    50-75           Positive            >900                  Gray                300-900                  Negative            <300      >75             Positive            >1800                  Gray                300-1800                  Negative            <300    Comprehensive Metabolic Panel [[id number]]  (Abnormal) Collected: 06/16/25 1950    Specimen: Blood from Arm, Left Updated: 06/16/25 2028     Glucose 127 mg/dL      BUN 34.0 mg/dL      Creatinine 1.91 mg/dL      Sodium 126 mmol/L      Potassium 4.9 mmol/L      Comment: Specimen hemolyzed.  Result may be falsely elevated.        Chloride 92 mmol/L      CO2 17.1 mmol/L      Calcium 8.5 mg/dL      Total Protein 7.3 g/dL      Albumin 3.3 g/dL      ALT (SGPT) 29 U/L      Comment: Specimen hemolyzed.  Result may  be falsely elevated.        AST (SGOT) 37 U/L      Comment: Specimen hemolyzed.  Result may be falsely elevated.        Alkaline Phosphatase 79 U/L      Total Bilirubin 1.0 mg/dL      Globulin 4.0 gm/dL      A/G Ratio 0.8 g/dL      BUN/Creatinine Ratio 17.8     Anion Gap 16.9 mmol/L      eGFR 40.1 mL/min/1.73     Narrative:      GFR Categories in Chronic Kidney Disease (CKD)              GFR Category          GFR (mL/min/1.73)    Interpretation  G1                    90 or greater        Normal or high (1)  G2                    60-89                Mild decrease (1)  G3a                   45-59                Mild to moderate decrease  G3b                   30-44                Moderate to severe decrease  G4                    15-29                Severe decrease  G5                    14 or less           Kidney failure    (1)In the absence of evidence of kidney disease, neither GFR category G1 or G2 fulfill the criteria for CKD.    eGFR calculation 2021 CKD-EPI creatinine equation, which does not include race as a factor    Lipase [[id number]]  (Abnormal)  Collected: 06/16/25 1950    Specimen: Blood from Arm, Left Updated: 06/16/25 2028     Lipase 7 U/L      Comment: Specimen hemolyzed.  Results may be falsely decreased.       Lactic Acid, Plasma [[id number]]  (Normal) Collected: 06/16/25 1950    Specimen: Blood from Arm, Left Updated: 06/16/25 2026     Lactate 2.0 mmol/L     Urinalysis, Microscopic Only - Straight Cath [[id number]]  (Abnormal) Collected: 06/16/25 1939    Specimen: Urine from Straight Cath Updated: 06/16/25 2018     RBC, UA 0-2 /HPF      WBC, UA None Seen /HPF      Comment: Urine culture not indicated.        Bacteria, UA Trace /HPF      Squamous Epithelial Cells, UA 3-6 /HPF      Hyaline Casts, UA None Seen /LPF      Methodology Manual Light Microscopy    Urinalysis With Culture If Indicated - Straight Cath [[id number]]  (Abnormal) Collected: 06/16/25 1939    Specimen: Urine from Straight Cath Updated: 06/16/25 2018     Color, UA Yellow     Appearance, UA Clear     pH, UA 5.5     Specific Gravity, UA 1.016     Glucose, UA Negative     Ketones, UA Trace     Bilirubin, UA Negative     Blood, UA Trace     Protein,  mg/dL (2+)     Leuk Esterase, UA Negative     Nitrite, UA Negative     Urobilinogen, UA 1.0 E.U./dL    Narrative:      In absence of clinical symptoms, the presence of pyuria, bacteria, and/or nitrites on the urinalysis result does not correlate with infection.    COVID PRE-OP / PRE-PROCEDURE SCREENING ORDER (NO ISOLATION) - Swab, Nasopharynx [[id number]]  (Normal) Collected: 06/16/25 1946    Specimen: Swab from Nasopharynx Updated: 06/16/25 2013    Narrative:      The following orders were created for panel order COVID PRE-OP / PRE-PROCEDURE SCREENING ORDER (NO ISOLATION) - Swab, Nasopharynx.  Procedure                               Abnormality         Status                     ---------                               -----------         ------                     COVID-19 and FLU A/B PCR...[[id number]]  Normal              Final result                  Please view results for these tests on the individual orders.    COVID-19 and FLU A/B PCR, 1 HR TAT - Swab, Nasopharynx [[id number]]  (Normal) Collected: 06/16/25 1946    Specimen: Swab from Nasopharynx Updated: 06/16/25 2013     COVID19 Not Detected     Influenza A PCR Not Detected     Influenza B PCR Not Detected    Narrative:      Fact sheet for providers: https://www.fda.gov/media/866541/download    Fact sheet for patients: https://www.fda.gov/media/702073/download    Test performed by PCR.    Blood Culture - Blood, Hand, Left [[id number]] Collected: 06/16/25 2007    Specimen: Blood from Hand, Left Updated: 06/16/25 2011    CBC & Differential [[id number]]  (Abnormal) Collected: 06/16/25 1950    Specimen: Blood from Arm, Left Updated: 06/16/25 2008    Narrative:      The following orders were created for panel order CBC & Differential.  Procedure                               Abnormality         Status                     ---------                               -----------         ------                     CBC Auto Differential[[id number]]        Abnormal            Final result                 Please view results for these tests on the individual orders.    CBC Auto Differential [[id number]]  (Abnormal) Collected: 06/16/25 1950    Specimen: Blood from Arm, Left Updated: 06/16/25 2008     WBC 23.48 10*3/mm3      RBC 4.10 10*6/mm3      Hemoglobin 11.9 g/dL      Hematocrit 34.9 %      MCV 85.1 fL      MCH 29.0 pg      MCHC 34.1 g/dL      RDW 13.4 %      RDW-SD 41.8 fl      MPV 11.0 fL      Platelets 212 10*3/mm3      Neutrophil % 94.0 %      Lymphocyte % 1.8 %      Monocyte % 3.3 %      Eosinophil % 0.0 %      Basophil % 0.2 %      Immature Grans % 0.7 %      Neutrophils, Absolute 22.06 10*3/mm3      Lymphocytes, Absolute 0.42 10*3/mm3      Monocytes, Absolute 0.78 10*3/mm3      Eosinophils, Absolute 0.00 10*3/mm3      Basophils, Absolute 0.05 10*3/mm3      Immature Grans, Absolute 0.17 10*3/mm3      nRBC  0.0 /100 WBC     Blood Culture - Blood, Arm, Left [[id number]] Collected: 06/16/25 1950    Specimen: Blood from Arm, Left Updated: 06/16/25 2003    POC Glucose Once [[id number]]  (Abnormal) Collected: 06/16/25 1929    Specimen: Blood Updated: 06/16/25 1930     Glucose 143 mg/dL      Comment: Serial Number: [id number]:  [id number]             Imaging Results (Last 24 Hours)       Procedure Component Value Units Date/Time    XR Chest 1 View [[id number]] Collected: 06/17/25 0818     Updated: 06/17/25 0824    Narrative:      PROCEDURE: XR CHEST 1 VW-     HISTORY: cough     COMPARISON: 5/23/2025.     FINDINGS: The heart is normal in size. The mediastinum is unremarkable.  The lungs are hypoinflated. There is pulmonary vascular congestion.  There is mild left basilar atelectasis. There is no pneumothorax. There  are old right rib fractures.       Impression:      Poor inspiration with atelectasis.                    Images were reviewed, interpreted, and dictated by Dr. Araims Dawson MD  Transcribed by Jovany Dwyer PA-C.     This report was signed and finalized on 6/17/2025 8:22 AM by Aramis Dawson MD.       CT Chest Without Contrast Diagnostic [[id number]] Collected: 06/17/25 0507     Updated: 06/17/25 0509    Narrative:      FINAL REPORT    TECHNIQUE:  null    CLINICAL HISTORY:  edema vs pna    COMPARISON:  null    FINDINGS:  CT chest without contrast:    Comparison: None    FINDINGS:    Lungs/pleura:    Low lung volumes.    Dependent subsegmental atelectasis bilaterally worse on the left. Peripheral left lower lobe and right lower lobe infiltrates can not be excluded.    No sizable pleural effusion. No pneumothorax.    Mediastinum/heart/aorta:    Heart size is normal. Coronary artery mural calcifications are present.    Mild mediastinal lymphadenopathy. Calcified hilar lymph nodes.    Thyroid without enlargement or mass. Unremarkable thoracic esophagus.    Uppermost abdomen:    No adrenal mass.    Bilateral  perinephric stranding and mild nephrolithiasis, without hydronephrosis.    MSK:    Moderate spondylosis of the thoracic spine.    No acute fracture involving the visualized bony thorax.    No destructive osseous lesion.    Old right clavicle fracture.      Impression:      IMPRESSION:    1. Low lung volumes.    2. Dependent atelectasis or peripheral infiltrates/pneumonia involving the lower lobes.    3. Mild mediastinal lymphadenopathy may be reactive.    4. Sequela of prior granulomatous infection.    5. Other findings above.    Authenticated and Electronically Signed by Jorge Yuen MD on  06/17/2025 05:07:42 AM          Physician Progress Notes (last 24 hours)  Notes from 06/16/25 0935 through 06/17/25 0935   No notes of this type exist for this encounter.          Consult Notes (last 24 hours)        Cornelio Wilson MD at 06/16/25 2223          [facility] Consult Note    Referring Provider: Sosa  Reason for Consultation: Abnormal EKG    Patient Care Team:  Sami Fisher MD as PCP - General (Internal Medicine)  Provider, No Known as PCP - Family Medicine  Yazmin Zayas MD as Surgeon (General Surgery)  Kaye Stanton PA-C as Physician Assistant (Endocrinology)    Chief complaint : Nausea    Subjective:    History of present illness: This is a 59-year-old male patient who presented to the emergency room with nausea.  The patient had a heart catheterization towards the end of May showing severe three-vessel coronary artery disease.  He was transferred to [facility] but was turned down as a candidate for CABG due to lack of distal graft targets.  He underwent multivessel stenting on June 2 with stents to the proximal-mid LAD and stenting of the mid RCA.  The patient was also noted to have severe disease throughout the circumflex which was not addressed.  In the emergency room the patient's twelve-lead electrocardiogram showed sinus tachycardia with prominent QS complexes in the right mid  precordial leads with J-point elevation consistent with a myocardial infarction of over 48 hours duration.  The patient denied having any chest discomfort since discharge from the hospital in early June.  He reported strict compliance with his aspirin and Brilinta therapy.  However, his outpatient medication profile (which may not be updated) does not reflect that he is taking P2 Y12 inhibitor therapy (only taking adult strength aspirin).  Cardiac troponin was elevated with a downward trend.  He has a history of severe insulin requiring type 2 diabetes mellitus.  He has a history of hypertension and dyslipidemia.  He has morbid obesity.  He has a history of prior left BKA.  The patient was noted to be febrile to 103 °F.  White blood cell count was  elevated, at greater than 23,000.  Chest x-ray demonstrated no evidence of pneumonia.  Respiratory panel was negative for viral pneumonias.  He has been started on broad-spectrum antibiotics empirically.  He remains a reformed smoker.    Review of Systems   Review of Systems   Constitutional: Negative for chills, diaphoresis, fever, malaise/fatigue, weight gain and weight loss.   HENT:  Negative for ear discharge, hearing loss, hoarse voice and nosebleeds.    Eyes:  Negative for discharge, double vision, pain and photophobia.   Cardiovascular:  Negative for chest pain, claudication, cyanosis, dyspnea on exertion, irregular heartbeat, leg swelling, near-syncope, orthopnea, palpitations, paroxysmal nocturnal dyspnea and syncope.   Respiratory:  Negative for cough, hemoptysis, sputum production and wheezing.    Endocrine: Negative for cold intolerance, heat intolerance, polydipsia, polyphagia and polyuria.   Hematologic/Lymphatic: Negative for adenopathy and bleeding problem. Does not bruise/bleed easily.   Skin:  Negative for color change, flushing, itching and rash.   Musculoskeletal:  Negative for muscle cramps, muscle weakness, myalgias and stiffness.   Gastrointestinal:   Positive for nausea and vomiting. Negative for abdominal pain, diarrhea, hematemesis and hematochezia.   Genitourinary:  Negative for dysuria, frequency and nocturia.   Neurological:  Negative for focal weakness, loss of balance, numbness, paresthesias and seizures.   Psychiatric/Behavioral:  Negative for altered mental status, hallucinations and suicidal ideas.    Allergic/Immunologic: Negative for HIV exposure, hives and persistent infections.       History  Past Medical History:   Diagnosis Date    Acute appendicitis with perforation, localized peritonitis, abscess, and gangrene 03/13/2024    Diabetes mellitus     Hypertension     Impaired mobility     Rupture of appendix 03/14/2024    Type 2 diabetes mellitus    ,   Past Surgical History:   Procedure Laterality Date    APPENDECTOMY N/A 3/13/2024    Procedure: APPENDECTOMY LAPAROSCOPIC;  Surgeon: Yazmin Zayas MD;  Location: [facility] OR;  Service: General;  Laterality: N/A;    BELOW KNEE LEG AMPUTATION      CARDIAC CATHETERIZATION N/A 5/23/2025    Procedure: Coronary angiography;  Surgeon: Cornelio Wilson MD;  Location: [facility] CATH INVASIVE LOCATION;  Service: Cardiology;  Laterality: N/A;    EYE SURGERY     ,   Family History   Problem Relation Age of Onset    Diabetes Mother     Diabetes Father     Diabetes Sister    ,   Social History     Tobacco Use    Smoking status: Former     Types: Cigarettes    Smokeless tobacco: Never   Vaping Use    Vaping status: Never Used   Substance Use Topics    Alcohol use: Never    Drug use: Never   ,   Medications Prior to Admission   Medication Sig Dispense Refill Last Dose/Taking    aspirin 325 MG EC tablet Take 1 tablet by mouth Daily.       atorvastatin (LIPITOR) 80 MG tablet Take 1 tablet by mouth Daily.       dapagliflozin Propanediol (Farxiga) 10 MG tablet Take 10 mg by mouth Daily.       furosemide (LASIX) 40 MG tablet Take 1 tablet by mouth At Night As Needed.       glucagon (GLUCAGEN) 1 MG injection Inject 1 mg  "under the skin into the appropriate area as directed 1 (One) Time As Needed (severe hypoglycemia) for up to 1 dose. 1 each 0     Insulin Lispro, 1 Unit Dial, (HumaLOG KwikPen) 100 UNIT/ML solution pen-injector Inject 45 Units under the skin into the appropriate area as directed 3 (Three) Times a Day With Meals. Add additional correction 2 unit for every 50 mg/dl over 150. Max daily dose 200 units. 180 mL 0     lisinopril (PRINIVIL,ZESTRIL) 5 MG tablet Take 1 tablet by mouth Daily.       TIRZEPATIDE SC Inject  under the skin into the appropriate area as directed.       Toujeo Max SoloStar 300 UNIT/ML solution pen-injector injection Inject 114 Units under the skin into the appropriate area as directed Daily. Titrate 2 units every 3 days if fasting blood sugar is more than 140. Max daily dose 120 units. 36 mL 1     and Allergies:  Duloxetine hcl    Objective:    Vital Sign Min/Max for last 24 hours  Temp  Min: 100.3 °F (37.9 °C)  Max: 103.4 °F (39.7 °C)   BP  Min: 82/58  Max: 120/70   Pulse  Min: 97  Max: 122   Resp  Min: 16  Max: 18   SpO2  Min: 92 %  Max: 95 %   Flow (L/min) (Oxygen Therapy)  Min: 2  Max: 2.5   No data recorded     Flowsheet Rows      Flowsheet Row First Filed Value   Admission Height 182.9 cm (72\") Documented at 06/16/2025 1924   Admission Weight --                 Echo EF Estimated  Lab Results   Component Value Date    ECHOEFEST 52.0 05/23/2025         Physical Exam:   Vitals and nursing note reviewed.   Constitutional:       Appearance: Healthy appearance. Not in distress.   Neck:      Vascular: No JVR. JVD normal.   Pulmonary:      Effort: Pulmonary effort is normal.      Breath sounds: Normal breath sounds. No wheezing. No rhonchi. No rales.   Chest:      Chest wall: Not tender to palpatation.   Cardiovascular:      PMI at left midclavicular line. Normal rate. Regular rhythm. Normal S1. Normal S2.       Murmurs: There is no murmur.      No gallop.  No click. No rub.   Pulses:     Intact distal " pulses.   Edema:     Peripheral edema absent.   Abdominal:      General: Bowel sounds are normal.      Palpations: Abdomen is soft.      Tenderness: There is no abdominal tenderness.   Musculoskeletal: Normal range of motion.         General: No tenderness.      Comments: Left BKA Skin:     General: Skin is warm and dry.   Neurological:      General: No focal deficit present.      Mental Status: Alert and oriented to person, place and time.         Results Review:   I reviewed the patient's new clinical results.  Results from last 7 days   Lab Units 06/16/25 1950   WBC 10*3/mm3 23.48*   HEMOGLOBIN g/dL 11.9*   HEMATOCRIT % 34.9*   PLATELETS 10*3/mm3 212     Results from last 7 days   Lab Units 06/16/25 1950   SODIUM mmol/L 126*   POTASSIUM mmol/L 4.9   CHLORIDE mmol/L 92*   CO2 mmol/L 17.1*   BUN mg/dL 34.0*   CREATININE mg/dL 1.91*   GLUCOSE mg/dL 127*   CALCIUM mg/dL 8.5*     Lab Results   Lab Value Date/Time    TROPONINT 3,086 (C) 06/16/2025 2121    TROPONINT 3,290 (C) 06/16/2025 1950    TROPONINT 64 (C) 05/23/2025 0812    TROPONINT 79 (C) 05/23/2025 0139    TROPONINT 72 (C) 05/23/2025 0016    TROPONINT 48 (H) 03/12/2024 2242    TROPONINT 45 (H) 03/12/2024 2040             Assessment/Plan:      Old MI (myocardial infarction)      Twelve-lead electrocardiogram is consistent with a heart attack involving the LAD distribution of greater than 48 hours duration, in addition to early LV aneurysm formation.  Cardiac troponins are trending downward.  I suspect the patient has had stent thrombosis due to extremely poor runoff in the distal LAD territory due to extremely small vessel caliber which precluded the option of CABG earlier this month (with possible noncompliance with dual antiplatelet therapy).    After multiple discussions with the emergency room doctor, I have agreed to offer the patient invasive coronary angiography with interventional standby, even though this does not appear to be an acute STEMI.    The  hospitalist service will be admitting and treating his sepsis, acute renal failure and other medical issues.    We will obtain an echocardiogram in the a.m.    I discussed the patient's findings and my recommendations with patient and family    Cornelio Wilson MD  06/16/25  22:25 EDT            Electronically signed by Cornelio Wilson MD at 06/16/25 8359

## 2025-06-17 NOTE — DISCHARGE SUMMARY
[facility]   DISCHARGE SUMMARY      Name:  Deep Mendez   Age:  59 y.o.  Sex:  male  :  1965  MRN:  [id number]   Visit Number:  [id number]    Admission Date:  2025  Date of Discharge:  2025  Primary Care Physician:  Sami Fisher MD    Important issues to note:    Start: Vancomycin, Zosyn, Brilinta  Stop: Nothing  Follow up: PCP and cardiology  Brief Summary: Presented with signs of sepsis and concern for NSTEMI. Initially had left heart cath which revealed severe multivessel disease not amenable to PCI patient requested transfer to  for second opinion which was approved.     Discharge Diagnoses:   Sepsis  Bilateral pneumonia  NSTEMI   TYRELL  Type 2 diabetes  CHF  Hypertension  Hyperlipidemia  Obesity  Impaired mobility and ADLs        Problem List:     Active Hospital Problems    Diagnosis  POA    **Old MI (myocardial infarction) [I25.2]  Not Applicable      Resolved Hospital Problems   No resolved problems to display.     Presenting Problem:    Chief Complaint   Patient presents with    Weakness - Generalized      Consults:     Consulting Physician(s)         Provider   Role Specialty     Stanford Elliott MD, CEDRIC      Consulting Physician Nephrology     Breeding, Cornelio FERREIRA MD      Consulting Physician Cardiology          Procedures Performed:    Procedure(s):  Left Heart Cath        History Of Presenting Illness:       Patient is a chronically ill 59-year-old male with history significant for multivessel CAD, type 2 diabetes, hypertension/hyperlipidemia who presents to the emergency room from home with multiple complaints.  Of note, patient taken emergently from ER to heart cath due to concerns for NSTEMI with troponins greater than 3000 x 2.  Patient seen post heart cath.  Denies chest pain, shortness of breath, cough.  Does admit to some generalized weakness and nausea without vomiting.  Patient's girlfriend also very poor historian.  History unreliable.  Patient  was noted to be positive for sepsis.  Suspect that likely source bilateral pneumonia.  Will continue broad-spectrum empiric antibiotics.  Edited by: Waqar Swan DO at 6/17/2025 1218    Hospital Course:    Sepsis  Bilateral pneumonia  SIRS criteria: Fever, tachycardia, leukocytosis  Empiric broad-spectrum antibiotics vancomycin and Zosyn  CT chest suggest pneumonia  DuoNebs as needed  MRSA screen  Follow culture results  NSTEMI   Troponin greater than 3000 x 2  Dr. Wilson consulted and patient taken from the ER to heart cath  Chronic LAD occlusion, severe diffuse circumflex disease.  Patency of mid RCA stent  Recommend GDMT, DAPT  Echo EF 35% grade 2 diastolic, wall motion abnormalities  6/17/2025: Admitting provider documentation reviewed. Patient requests transfer for second opinion. Discussed with Dr. Snow  transfer center. Dr [facility]  TYRELL  Likely multifactorial in the setting of sepsis and NSTEMI  Nephrology consultation  Suspect component of cardiorenal  Type 2 diabetes  Glucomander protocol  A1c 10.2  Diabetic education  Obesity  Impaired mobility and ADLs           DVT Prophylaxis: Heparin sq  Code Status: Full  Diet: Cardiac/Diabetic/Renal  Disposition: pending transfer to    Edited by: Waqar Swan DO at 6/17/2025 1145      Vital Signs:    Temp:  [98 °F (36.7 °C)-103.4 °F (39.7 °C)] 98.7 °F (37.1 °C)  Heart Rate:  [] 85  Resp:  [16-20] 20  BP: ()/() 123/89    Physical Exam:    Constitutional: No acute distress, awake, alert  HENT: NCAT, mucous membranes moist  Respiratory: subtle rales bilaterally, respiratory effort normal   Cardiovascular: RRR, no murmurs, rubs, or gallops  Gastrointestinal: Positive bowel sounds, soft, nontender, nondistended  Musculoskeletal: Status post left BKA 3+ ankle edema on right  Psychiatric: Appropriate affect, cooperative  Neurologic: Oriented x 3, speech clear  Skin: RLE cellulitis and vascular ulcers   Edited by: Sosa  Waqar WallDO at 6/17/2025 1149    Pertinent Lab Results:     Results from last 7 days   Lab Units 06/17/25  0409 06/16/25 1950   SODIUM mmol/L 128* 126*   POTASSIUM mmol/L 4.9 4.9   CHLORIDE mmol/L 97* 92*   CO2 mmol/L 15.8* 17.1*   BUN mg/dL 38.0* 34.0*   CREATININE mg/dL 1.86* 1.91*   CALCIUM mg/dL 8.2* 8.5*   BILIRUBIN mg/dL  --  1.0   ALK PHOS U/L  --  79   ALT (SGPT) U/L  --  29   AST (SGOT) U/L  --  37   GLUCOSE mg/dL 184* 127*     Results from last 7 days   Lab Units 06/17/25  0409 06/16/25 1950   WBC 10*3/mm3 20.86* 23.48*   HEMOGLOBIN g/dL 11.2* 11.9*   HEMATOCRIT % 34.1* 34.9*   PLATELETS 10*3/mm3 184 212         Results from last 7 days   Lab Units 06/16/25 2121 06/16/25 1950   HSTROP T ng/L 3,086* 3,290*     Results from last 7 days   Lab Units 06/16/25 1950   PROBNP pg/mL 13,479.0*         Results from last 7 days   Lab Units 06/16/25 1950   LIPASE U/L 7*               Pertinent Radiology Results:    Imaging Results (All)       Procedure Component Value Units Date/Time    XR Chest 1 View [[id number]] Collected: 06/17/25 0818     Updated: 06/17/25 0824    Narrative:      PROCEDURE: XR CHEST 1 VW-     HISTORY: cough     COMPARISON: 5/23/2025.     FINDINGS: The heart is normal in size. The mediastinum is unremarkable.  The lungs are hypoinflated. There is pulmonary vascular congestion.  There is mild left basilar atelectasis. There is no pneumothorax. There  are old right rib fractures.       Impression:      Poor inspiration with atelectasis.                    Images were reviewed, interpreted, and dictated by Dr. Aramis Dawson MD  Transcribed by Jovany Dwyer PA-C.     This report was signed and finalized on 6/17/2025 8:22 AM by Aramis Dawson MD.       CT Chest Without Contrast Diagnostic [[id number]] Collected: 06/17/25 0507     Updated: 06/17/25 0509    Narrative:      FINAL REPORT    TECHNIQUE:  null    CLINICAL HISTORY:  edema vs pna    COMPARISON:  null    FINDINGS:  CT chest without  contrast:    Comparison: None    FINDINGS:    Lungs/pleura:    Low lung volumes.    Dependent subsegmental atelectasis bilaterally worse on the left. Peripheral left lower lobe and right lower lobe infiltrates can not be excluded.    No sizable pleural effusion. No pneumothorax.    Mediastinum/heart/aorta:    Heart size is normal. Coronary artery mural calcifications are present.    Mild mediastinal lymphadenopathy. Calcified hilar lymph nodes.    Thyroid without enlargement or mass. Unremarkable thoracic esophagus.    Uppermost abdomen:    No adrenal mass.    Bilateral perinephric stranding and mild nephrolithiasis, without hydronephrosis.    MSK:    Moderate spondylosis of the thoracic spine.    No acute fracture involving the visualized bony thorax.    No destructive osseous lesion.    Old right clavicle fracture.      Impression:      IMPRESSION:    1. Low lung volumes.    2. Dependent atelectasis or peripheral infiltrates/pneumonia involving the lower lobes.    3. Mild mediastinal lymphadenopathy may be reactive.    4. Sequela of prior granulomatous infection.    5. Other findings above.    Authenticated and Electronically Signed by Jorge Yuen MD on  06/17/2025 05:07:42 AM            Echo:    Results for orders placed during the hospital encounter of 06/16/25    Adult Transthoracic Echo Complete W/ Cont if Necessary Per Protocol    Interpretation Summary    Left ventricular systolic function is moderately decreased. Calculated left ventricular EF = 32.7% Left ventricular ejection fraction appears to be 31 - 35%.    The left ventricular cavity is moderate to severely dilated.    The following left ventricular wall segments are aneurysmal: mid anterior, mid anteroseptal, apical anterior, apical septal and apex.    An anterior apical is present.    Left ventricular diastolic function is consistent with (grade II w/high LAP) pseudonormalization.    Condition on Discharge:      Stable.    Code status during the  hospital stay:    Code Status and Medical Interventions: CPR (Attempt to Resuscitate); Full Support   Ordered at: 06/16/25 8509     Code Status (Patient has no pulse and is not breathing):    CPR (Attempt to Resuscitate)     Medical Interventions (Patient has pulse or is breathing):    Full Support     Discharge Disposition:        Discharge Medications:       Discharge Medications        New Medications        Instructions Start Date   Lidocaine 4 %   1 patch, Transdermal, Every 24 Hours Scheduled, Remove & Discard patch within 12 hours or as directed by MD             Changes to Medications        Instructions Start Date   aspirin 81 MG EC tablet  What changed:   medication strength  how much to take   81 mg, Oral, Daily   Start Date: June 18, 2025     atorvastatin 80 MG tablet  Commonly known as: LIPITOR  What changed: how much to take   80 mg, Oral, Daily             Continue These Medications        Instructions Start Date   Farxiga 10 MG tablet  Generic drug: dapagliflozin Propanediol   1 tablet, Daily      furosemide 40 MG tablet  Commonly known as: LASIX   40 mg, Nightly PRN      glucagon 1 MG injection  Commonly known as: GLUCAGEN   1 mg, Subcutaneous, Once As Needed      Insulin Lispro (1 Unit Dial) 100 UNIT/ML solution pen-injector  Commonly known as: HumaLOG KwikPen   45 Units, Subcutaneous, 3 Times Daily With Meals, Add additional correction 2 unit for every 50 mg/dl over 150. Max daily dose 200 units.      lisinopril 5 MG tablet  Commonly known as: PRINIVIL,ZESTRIL   5 mg, Daily      TIRZEPATIDE SC   Subcutaneous      Toujeo Max SoloStar 300 UNIT/ML solution pen-injector injection  Generic drug: Insulin Glargine (2 Unit Dial)   114 Units, Subcutaneous, Daily, Titrate 2 units every 3 days if fasting blood sugar is more than 140. Max daily dose 120 units.             ASK your doctor about these medications        Instructions Start Date   metoprolol succinate XL 25 MG 24 hr tablet  Commonly known as:  TOPROL-XL   25 mg, Oral, Daily      Niacin ER 1000 MG tablet controlled-release   1,000 mg, Oral, Daily      ticagrelor 90 MG tablet tablet  Commonly known as: BRILINTA  Ask about: Which instructions should I use?   90 mg, Oral, 2 Times Daily      ticagrelor 90 MG tablet tablet  Commonly known as: BRILINTA  Ask about: Which instructions should I use?   90 mg, Oral, 2 Times Daily      valsartan 40 MG tablet  Commonly known as: DIOVAN   40 mg, Oral, Daily             Discharge Diet:       Activity at Discharge:       Follow-up Appointments:      No future appointments.  Test Results Pending at Discharge:    Pending Labs       Order Current Status    Blood Culture - Blood, Arm, Left In process    Blood Culture - Blood, Hand, Left In process               Waqar Swan DO  06/17/25  14:27 EDT    Time: I spent 45 minutes on this discharge activity which included: face-to-face encounter with the patient, reviewing the data in the system, coordination of the care with the nursing staff as well as consultants, documentation, and entering orders.     Dictated utilizing Dragon dictation.

## 2025-06-17 NOTE — CASE MANAGEMENT/SOCIAL WORK
Case Management/Social Work    Patient Name:  Deep Mendez  YOB: 1965  MRN: [id number]  Admit Date:  6/16/2025        15:14 EDT   Discharge Plan       Row Name 06/17/25 1513       Plan    Plan Transfer to [facility]    Patient/Family in Agreement with Plan yes    Final Discharge Disposition Code       Row Name 06/17/25 1236       Plan    Plan Transfer to [facility] or [facility], pending an available bed    Patient/Family in Agreement with Plan yes    Plan Comments Spoke to patient at bedside to initiate discharge planning and delivered IMM. He lives at home with his significant other in [facility] [facility]. Prior to admission, he was independent with ADL's and drives. He states uses a rolling walker for assist. He is not current with home health and does not use home oxygen. His PCP is Sami Fisher.  Confirmed demographics. He uses [facility]. He is not agreeable to meds to bed. His plan is to transfer to [facility] or [facility], pending an available bed. CM will continue to follow.                        Electronically signed by:  Deanne Knutson RN  06/17/25 15:14 EDT

## 2025-06-17 NOTE — PROGRESS NOTES
Pharmacy Consult-Vancomycin Dosing    Deep Mendez is a  59 y.o. male receiving vancomycin therapy.     Indication: Pneumonia  Consulting Provider: Dr. Teddy Rogel    Goal AUC: 400-600 mg/:L*hr    Current Antimicrobial Therapy  Anti-Infectives (From admission, onward)      Ordered     Dose/Rate Route Frequency Start Stop    06/17/25 0057  vancomycin (VANCOCIN) 1,500 mg in sodium chloride 0.9 % 500 mL IVPB        Ordering Provider: Teddy Rogel DO    1,500 mg  333.3 mL/hr over 90 Minutes Intravenous Every 24 Hours 06/17/25 1000 06/24/25 0959    06/17/25 0039  piperacillin-tazobactam (ZOSYN) IVPB 4.5 g IVPB in 100 mL NS (VTB)        Ordering Provider: Teddy Rogel DO    4.5 g  over 4 Hours Intravenous Every 8 Hours 06/17/25 0400 06/22/25 0359    06/16/25 2347  Pharmacy To Dose: Piperacillin-tazobactam (Zosyn)        Ordering Provider: Teddy Rogel DO     Not Applicable Continuous PRN 06/16/25 2346 06/21/25 2345    06/16/25 2347  Pharmacy to dose vancomycin        Ordering Provider: Teddy Rogel DO     Not Applicable Continuous PRN 06/16/25 2345 06/23/25 2344    06/16/25 2048  vancomycin 2500 mg/500 mL 0.9% NS IVPB ([facility])        Ordering Provider: Roddy Mcpherson MD    20 mg/kg × 127 kg  over 150 Minutes Intravenous Once 06/16/25 2104 06/17/25 0020    06/16/25 2048  piperacillin-tazobactam (ZOSYN) IVPB 4.5 g IVPB in 100 mL NS (VTB)        Ordering Provider: Roddy Mcpherson MD    4.5 g  over 30 Minutes Intravenous Once 06/16/25 2104 06/16/25 2145            Labs  Results from last 7 days   Lab Units 06/16/25  1950   WBC 10*3/mm3 23.48*   CREATININE mg/dL 1.91*      Estimated Creatinine Clearance: 56.6 mL/min (A) (by C-G formula based on SCr of 1.91 mg/dL (H)).  Temp Readings from Last 1 Encounters:   06/17/25 98 °F (36.7 °C) (Oral)       Microbiology Culture results  Microbiology Results (last 10 days)       Procedure Component Value - Date/Time    COVID PRE-OP / PRE-PROCEDURE SCREENING ORDER  "(NO ISOLATION) - Swab, Nasopharynx [[id number]]  (Normal) Collected: 06/16/25 1946    Lab Status: Final result Specimen: Swab from Nasopharynx Updated: 06/16/25 2013    Narrative:      The following orders were created for panel order COVID PRE-OP / PRE-PROCEDURE SCREENING ORDER (NO ISOLATION) - Swab, Nasopharynx.  Procedure                               Abnormality         Status                     ---------                               -----------         ------                     COVID-19 and FLU A/B PCR...[[id number]]  Normal              Final result                 Please view results for these tests on the individual orders.    COVID-19 and FLU A/B PCR, 1 HR TAT - Swab, Nasopharynx [[id number]]  (Normal) Collected: 06/16/25 1946    Lab Status: Final result Specimen: Swab from Nasopharynx Updated: 06/16/25 2013     COVID19 Not Detected     Influenza A PCR Not Detected     Influenza B PCR Not Detected    Narrative:      Fact sheet for providers: https://www.fda.gov/media/857237/download    Fact sheet for patients: https://www.fda.gov/media/565610/download    Test performed by PCR.            Evaluation of Dosing     Last Dose Received in the ED/Outside Facility: ED-2500 mg 6/16/25 @2150  Is Patient on Dialysis or Renal Replacement: No    Ht - 182.9 cm (72\")  Wt - 121 kg (265 lb 14 oz)    _________________________________    Predicted Steady State AUC on New Dose:   501 mg/L*hr    Assessment/Plan    Pharmacy to dose vancomycin for pneumonia. Goal -600 mg/L*hr.  Patient received loading dose of vancomycin 2500mg (~20mg/kg) IV on 06/16/25 @ 2150. Initiate maintenance dose of vancomycin 1500mg (~12.4mg/kg) IV Q24hr on 06/17/25 @ 1000.  Assess clearance by vancomycin random am level on 06/17/25 @ ~0600.  Pharmacy will continue to monitor renal function, cultures and sensitivities, and clinical status to adjust regimen as necessary.      Thank you,  Isa Hernandez RPH  06/17/25 01:00 EDT    "

## 2025-06-17 NOTE — CONSULTS
[facility] Consult Note    Referring Provider: Sosa  Reason for Consultation: Abnormal EKG    Patient Care Team:  Sami Fisher MD as PCP - General (Internal Medicine)  Provider, No Known as PCP - Family Medicine  Yazmin Zayas MD as Surgeon (General Surgery)  Kaye Stanton PA-C as Physician Assistant (Endocrinology)    Chief complaint : Nausea    Subjective:    History of present illness: This is a 59-year-old male patient who presented to the emergency room with nausea.  The patient had a heart catheterization towards the end of May showing severe three-vessel coronary artery disease.  He was transferred to [facility] but was turned down as a candidate for CABG due to lack of distal graft targets.  He underwent multivessel stenting on June 2 with stents to the proximal-mid LAD and stenting of the mid RCA.  The patient was also noted to have severe disease throughout the circumflex which was not addressed.  In the emergency room the patient's twelve-lead electrocardiogram showed sinus tachycardia with prominent QS complexes in the right mid precordial leads with J-point elevation consistent with a myocardial infarction of over 48 hours duration.  The patient denied having any chest discomfort since discharge from the hospital in early June.  He reported strict compliance with his aspirin and Brilinta therapy.  However, his outpatient medication profile (which may not be updated) does not reflect that he is taking P2 Y12 inhibitor therapy (only taking adult strength aspirin).  Cardiac troponin was elevated with a downward trend.  He has a history of severe insulin requiring type 2 diabetes mellitus.  He has a history of hypertension and dyslipidemia.  He has morbid obesity.  He has a history of prior left BKA.  The patient was noted to be febrile to 103 °F.  White blood cell count was  elevated, at greater than 23,000.  Chest x-ray demonstrated no evidence of pneumonia.  Respiratory panel  was negative for viral pneumonias.  He has been started on broad-spectrum antibiotics empirically.  He remains a reformed smoker.    Review of Systems   Review of Systems   Constitutional: Negative for chills, diaphoresis, fever, malaise/fatigue, weight gain and weight loss.   HENT:  Negative for ear discharge, hearing loss, hoarse voice and nosebleeds.    Eyes:  Negative for discharge, double vision, pain and photophobia.   Cardiovascular:  Negative for chest pain, claudication, cyanosis, dyspnea on exertion, irregular heartbeat, leg swelling, near-syncope, orthopnea, palpitations, paroxysmal nocturnal dyspnea and syncope.   Respiratory:  Negative for cough, hemoptysis, sputum production and wheezing.    Endocrine: Negative for cold intolerance, heat intolerance, polydipsia, polyphagia and polyuria.   Hematologic/Lymphatic: Negative for adenopathy and bleeding problem. Does not bruise/bleed easily.   Skin:  Negative for color change, flushing, itching and rash.   Musculoskeletal:  Negative for muscle cramps, muscle weakness, myalgias and stiffness.   Gastrointestinal:  Positive for nausea and vomiting. Negative for abdominal pain, diarrhea, hematemesis and hematochezia.   Genitourinary:  Negative for dysuria, frequency and nocturia.   Neurological:  Negative for focal weakness, loss of balance, numbness, paresthesias and seizures.   Psychiatric/Behavioral:  Negative for altered mental status, hallucinations and suicidal ideas.    Allergic/Immunologic: Negative for HIV exposure, hives and persistent infections.       History  Past Medical History:   Diagnosis Date    Acute appendicitis with perforation, localized peritonitis, abscess, and gangrene 03/13/2024    Diabetes mellitus     Hypertension     Impaired mobility     Rupture of appendix 03/14/2024    Type 2 diabetes mellitus    ,   Past Surgical History:   Procedure Laterality Date    APPENDECTOMY N/A 3/13/2024    Procedure: APPENDECTOMY LAPAROSCOPIC;  Surgeon:  Yazmin Zayas MD;  Location: [facility] OR;  Service: General;  Laterality: N/A;    BELOW KNEE LEG AMPUTATION      CARDIAC CATHETERIZATION N/A 5/23/2025    Procedure: Coronary angiography;  Surgeon: Cornelio Wilson MD;  Location: [facility] CATH INVASIVE LOCATION;  Service: Cardiology;  Laterality: N/A;    EYE SURGERY     ,   Family History   Problem Relation Age of Onset    Diabetes Mother     Diabetes Father     Diabetes Sister    ,   Social History     Tobacco Use    Smoking status: Former     Types: Cigarettes    Smokeless tobacco: Never   Vaping Use    Vaping status: Never Used   Substance Use Topics    Alcohol use: Never    Drug use: Never   ,   Medications Prior to Admission   Medication Sig Dispense Refill Last Dose/Taking    aspirin 325 MG EC tablet Take 1 tablet by mouth Daily.       atorvastatin (LIPITOR) 80 MG tablet Take 1 tablet by mouth Daily.       dapagliflozin Propanediol (Farxiga) 10 MG tablet Take 10 mg by mouth Daily.       furosemide (LASIX) 40 MG tablet Take 1 tablet by mouth At Night As Needed.       glucagon (GLUCAGEN) 1 MG injection Inject 1 mg under the skin into the appropriate area as directed 1 (One) Time As Needed (severe hypoglycemia) for up to 1 dose. 1 each 0     Insulin Lispro, 1 Unit Dial, (HumaLOG KwikPen) 100 UNIT/ML solution pen-injector Inject 45 Units under the skin into the appropriate area as directed 3 (Three) Times a Day With Meals. Add additional correction 2 unit for every 50 mg/dl over 150. Max daily dose 200 units. 180 mL 0     lisinopril (PRINIVIL,ZESTRIL) 5 MG tablet Take 1 tablet by mouth Daily.       TIRZEPATIDE SC Inject  under the skin into the appropriate area as directed.       Toujeo Max SoloStar 300 UNIT/ML solution pen-injector injection Inject 114 Units under the skin into the appropriate area as directed Daily. Titrate 2 units every 3 days if fasting blood sugar is more than 140. Max daily dose 120 units. 36 mL 1     and Allergies:  Duloxetine  "hcl    Objective:    Vital Sign Min/Max for last 24 hours  Temp  Min: 100.3 °F (37.9 °C)  Max: 103.4 °F (39.7 °C)   BP  Min: 82/58  Max: 120/70   Pulse  Min: 97  Max: 122   Resp  Min: 16  Max: 18   SpO2  Min: 92 %  Max: 95 %   Flow (L/min) (Oxygen Therapy)  Min: 2  Max: 2.5   No data recorded     Flowsheet Rows      Flowsheet Row First Filed Value   Admission Height 182.9 cm (72\") Documented at 06/16/2025 1924   Admission Weight --                 Echo EF Estimated  Lab Results   Component Value Date    ECHOEFEST 52.0 05/23/2025         Physical Exam:   Vitals and nursing note reviewed.   Constitutional:       Appearance: Healthy appearance. Not in distress.   Neck:      Vascular: No JVR. JVD normal.   Pulmonary:      Effort: Pulmonary effort is normal.      Breath sounds: Normal breath sounds. No wheezing. No rhonchi. No rales.   Chest:      Chest wall: Not tender to palpatation.   Cardiovascular:      PMI at left midclavicular line. Normal rate. Regular rhythm. Normal S1. Normal S2.       Murmurs: There is no murmur.      No gallop.  No click. No rub.   Pulses:     Intact distal pulses.   Edema:     Peripheral edema absent.   Abdominal:      General: Bowel sounds are normal.      Palpations: Abdomen is soft.      Tenderness: There is no abdominal tenderness.   Musculoskeletal: Normal range of motion.         General: No tenderness.      Comments: Left BKA Skin:     General: Skin is warm and dry.   Neurological:      General: No focal deficit present.      Mental Status: Alert and oriented to person, place and time.         Results Review:   I reviewed the patient's new clinical results.  Results from last 7 days   Lab Units 06/16/25 1950   WBC 10*3/mm3 23.48*   HEMOGLOBIN g/dL 11.9*   HEMATOCRIT % 34.9*   PLATELETS 10*3/mm3 212     Results from last 7 days   Lab Units 06/16/25 1950   SODIUM mmol/L 126*   POTASSIUM mmol/L 4.9   CHLORIDE mmol/L 92*   CO2 mmol/L 17.1*   BUN mg/dL 34.0*   CREATININE mg/dL 1.91* "   GLUCOSE mg/dL 127*   CALCIUM mg/dL 8.5*     Lab Results   Lab Value Date/Time    TROPONINT 3,086 (C) 06/16/2025 2121    TROPONINT 3,290 (C) 06/16/2025 1950    TROPONINT 64 (C) 05/23/2025 0812    TROPONINT 79 (C) 05/23/2025 0139    TROPONINT 72 (C) 05/23/2025 0016    TROPONINT 48 (H) 03/12/2024 2242    TROPONINT 45 (H) 03/12/2024 2040             Assessment/Plan:      Old MI (myocardial infarction)      Twelve-lead electrocardiogram is consistent with a heart attack involving the LAD distribution of greater than 48 hours duration, in addition to early LV aneurysm formation.  Cardiac troponins are trending downward.  I suspect the patient has had stent thrombosis due to extremely poor runoff in the distal LAD territory due to extremely small vessel caliber which precluded the option of CABG earlier this month (with possible noncompliance with dual antiplatelet therapy).    After multiple discussions with the emergency room doctor, I have agreed to offer the patient invasive coronary angiography with interventional standby, even though this does not appear to be an acute STEMI.    The hospitalist service will be admitting and treating his sepsis, acute renal failure and other medical issues.    We will obtain an echocardiogram in the a.m.    I discussed the patient's findings and my recommendations with patient and family    Cornelio Wilson MD  06/16/25  22:25 EDT

## 2025-06-17 NOTE — PLAN OF CARE
Goal Outcome Evaluation:  Plan of Care Reviewed With: patient        Progress: no change  Outcome Evaluation: Patient admitted to ICU during this shift. Patient alert and oriented, reports of back pain, restless/grimacing frequently, PRN pain and anxiety medication given, sinus rhythm, O2 > 92% on room air this AM, MAP > 65, and patient sleeping in-between care this AM.

## 2025-06-21 LAB
BACTERIA SPEC AEROBE CULT: NORMAL
BACTERIA SPEC AEROBE CULT: NORMAL

## 2025-07-03 ENCOUNTER — DOCUMENTATION (OUTPATIENT)
Dept: CARDIAC REHAB | Facility: HOSPITAL | Age: 60
End: 2025-07-03
Payer: MEDICARE

## 2025-07-03 NOTE — PROGRESS NOTES
Patient contacting regarding referral for Phase II Cardiac Rehab. Patient given information about Cardiac Rehab and benefits of participation. Patient refused at this time.

## (undated) DEVICE — ENDOPATH XCEL BLADELESS TROCARS WITH STABILITY SLEEVES: Brand: ENDOPATH XCEL

## (undated) DEVICE — CATH F6 ST JR 4 100CM: Brand: SUPERTORQUE

## (undated) DEVICE — GUIDE CATHETER: Brand: MACH1™

## (undated) DEVICE — TR BAND RADIAL ARTERY COMPRESSION DEVICE: Brand: TR BAND

## (undated) DEVICE — DRSNG SURESITE WNDW 4X4.5

## (undated) DEVICE — GLIDESHEATH SLENDER STAINLESS STEEL KIT: Brand: GLIDESHEATH SLENDER

## (undated) DEVICE — TOTAL TRAY, 16FR 10ML SIL FOLEY, URN: Brand: MEDLINE

## (undated) DEVICE — RESERVOIR,SUCTION,100CC,SILICONE: Brand: MEDLINE

## (undated) DEVICE — ST ACC MICROPUNCTURE STFF .018 ECHO/PLAT/TP 4F/10CM 21G/7CM

## (undated) DEVICE — PINNACLE INTRODUCER SHEATH: Brand: PINNACLE

## (undated) DEVICE — DGW .035 FC J3MM 260CM TEF: Brand: EMERALD

## (undated) DEVICE — ADHS LIQ MASTISOL 2/3ML

## (undated) DEVICE — RADIFOCUS OPTITORQUE ANGIOGRAPHIC CATHETER: Brand: OPTITORQUE

## (undated) DEVICE — SLV SCD CALF HEMOFORCE DVT THERP REPROC MD

## (undated) DEVICE — NDL HYPO ECLPS SFTY 22G 1 1/2IN

## (undated) DEVICE — GW STR PT2 MS .014CM 300CM

## (undated) DEVICE — GLV SURG SENSICARE GREEN W/ALOE PF LF 6 STRL

## (undated) DEVICE — RUNTHROUGH NS EXTRA FLOPPY PTCA GUIDEWIRE: Brand: RUNTHROUGH

## (undated) DEVICE — SOL NACL 0.9PCT 1000ML

## (undated) DEVICE — GAUZE,SPONGE,4"X4",12PLY,STERILE,LF,2'S: Brand: MEDLINE

## (undated) DEVICE — THE ECHELON FLEX POWERED PLUS ARTICULATING ENDOSCOPIC LINEAR CUTTERS ARE STERILE, SINGLE PATIENT USE INSTRUMENTS THAT SIMULTANEOUSLYCUT AND STAPLE TISSUE. THERE ARE SIX STAGGERED ROWS OF STAPLES, THREE ON EITHER SIDE OF THE CUT LINE. THE ECHELON FLEX 45 POWERED PLUSINSTRUMENTS HAVE A STAPLE LINE THAT IS APPROXIMATELY 45 MM LONG AND A CUT LINE THAT IS APPROXIMATELY 42 MM LONG. THE SHAFT CAN ROTATE FREELYIN BOTH DIRECTIONS AND AN ARTICULATION MECHANISM ENABLES THE DISTAL PORTION OF THE SHAFT TO PIVOT TO FACILITATE LATERAL ACCESS TO THE OPERATIVESITE.THE INSTRUMENTS ARE PACKAGED WITH A PRIMARY LITHIUM BATTERY PACK THAT MUST BE INSTALLED PRIOR TO USE. THERE ARE SPECIFIC REQUIREMENTS FORDISPOSING OF THE BATTERY PACK. REFER TO THE BATTERY PACK DISPOSAL SECTION.THE INSTRUMENTS ARE PACKAGED WITHOUT A RELOAD AND MUST BE LOADED PRIOR TO USE. A STAPLE RETAINING CAP ON THE RELOAD PROTECTS THE STAPLE LEGPOINTS DURING SHIPPING AND TRANSPORTATION. THE INSTRUMENTS’ LOCK-OUT FEATURE IS DESIGNED TO PREVENT A USED OR IMPROPERLY INSTALLED RELOADFROM BEING REFIRED OR AN INSTRUMENT FROM BEING FIRED WITHOUT A RELOAD.: Brand: ECHELON FLEX

## (undated) DEVICE — ENDOPATH XCEL BLUNT TIP TROCARS WITH SMOOTH SLEEVES: Brand: ENDOPATH XCEL

## (undated) DEVICE — UNDYED MONOFILAMENT (POLYDIOXANONE), ABSORBABLE SYNTHETIC SURGICAL SUTURE: Brand: PDS

## (undated) DEVICE — SOL IRR NACL 0.9PCT BT 1000ML

## (undated) DEVICE — GLV SURG ULTRATOUCH BIOGEL/COAT PF LF SZ6 STRL

## (undated) DEVICE — Device

## (undated) DEVICE — ENDOPATH XCEL UNIVERSAL TROCAR STABLILITY SLEEVES: Brand: ENDOPATH XCEL

## (undated) DEVICE — ST TBG PNEUMOCLEAR EVAC SMOKE HIFLO

## (undated) DEVICE — RICH GENERAL LAPAROSCOPY: Brand: MEDLINE INDUSTRIES, INC.

## (undated) DEVICE — 2, DISPOSABLE SUCTION/IRRIGATOR WITHOUT DISPOSABLE TIP: Brand: STRYKEFLOW

## (undated) DEVICE — DRN WND HUBLSS FLUT FULL PERF SIL10MM

## (undated) DEVICE — BNDG ADHS PLSTC 1X3IN LF

## (undated) DEVICE — CATH F6 ST JL 4 100CM: Brand: SUPERTORQUE

## (undated) DEVICE — SUT PROLN 3/0 8832H

## (undated) DEVICE — SYR LL TP 10ML STRL

## (undated) DEVICE — DISPOSABLE MONOPOLAR ENDOSCOPIC CORD 10 FT. (3M): Brand: KIRWAN

## (undated) DEVICE — INFLATION DEVICE KIT: Brand: ENCORE™ 26 ADVANTAGE KIT

## (undated) DEVICE — GW INQWIRE FC PTFE STD J/1.5 .035 260

## (undated) DEVICE — ANGIO-SEAL VIP VASCULAR CLOSURE DEVICE: Brand: ANGIO-SEAL

## (undated) DEVICE — SUT VIC 0/0 UR6 27IN DYED J603H